# Patient Record
Sex: MALE | Race: WHITE | NOT HISPANIC OR LATINO | Employment: PART TIME | URBAN - NONMETROPOLITAN AREA
[De-identification: names, ages, dates, MRNs, and addresses within clinical notes are randomized per-mention and may not be internally consistent; named-entity substitution may affect disease eponyms.]

---

## 2017-06-07 ENCOUNTER — HOSPITAL ENCOUNTER (EMERGENCY)
Facility: HOSPITAL | Age: 22
Discharge: HOME | End: 2017-06-07
Admitting: EMERGENCY MEDICINE

## 2017-06-07 VITALS
HEIGHT: 70 IN | RESPIRATION RATE: 19 BRPM | DIASTOLIC BLOOD PRESSURE: 78 MMHG | HEART RATE: 81 BPM | OXYGEN SATURATION: 100 % | SYSTOLIC BLOOD PRESSURE: 137 MMHG | WEIGHT: 204.25 LBS | TEMPERATURE: 97.4 F | BODY MASS INDEX: 29.24 KG/M2

## 2017-06-07 DIAGNOSIS — K08.89 PAIN, DENTAL: ICD-10-CM

## 2017-06-07 DIAGNOSIS — K04.7 DENTAL ABSCESS: Primary | ICD-10-CM

## 2017-06-07 PROCEDURE — 99282 EMERGENCY DEPT VISIT SF MDM: CPT

## 2017-06-07 RX ORDER — IBUPROFEN 800 MG/1
800 TABLET ORAL EVERY 8 HOURS PRN
Qty: 15 TABLET | Refills: 0 | Status: SHIPPED | OUTPATIENT
Start: 2017-06-07 | End: 2019-01-16

## 2017-06-07 RX ORDER — PENICILLIN V POTASSIUM 500 MG/1
500 TABLET ORAL 4 TIMES DAILY
Qty: 40 TABLET | Refills: 0 | Status: SHIPPED | OUTPATIENT
Start: 2017-06-07 | End: 2017-06-17

## 2017-06-07 NOTE — ED PROVIDER NOTES
Subjective   Patient is a 22 y.o. male presenting with tooth pain.   History provided by:  Patient   used: No    Dental Pain   Location:  Upper  Upper teeth location:  2/RU 2nd molar  Quality:  Throbbing and pulsating  Severity:  Moderate  Onset quality:  Gradual  Duration:  7 days  Timing:  Constant  Progression:  Worsening  Chronicity:  New  Context: poor dentition    Context: cap still on, not crown fracture, not dental caries, not dental fracture, not enamel fracture, filling intact, not intrusion, not malocclusion, not recent dental surgery and not trauma    Relieved by:  Nothing  Worsened by:  Nothing  Ineffective treatments:  None tried  Associated symptoms: facial swelling    Associated symptoms: no congestion, no difficulty swallowing, no facial pain, no fever, no gum swelling, no headaches, no neck pain, no neck swelling, no oral bleeding, no oral lesions and no trismus        Review of Systems   Constitutional: Negative for fever.   HENT: Positive for dental problem and facial swelling. Negative for congestion and mouth sores.    Eyes: Negative.    Respiratory: Negative.    Cardiovascular: Negative.    Gastrointestinal: Negative.    Endocrine: Negative.    Genitourinary: Negative.    Musculoskeletal: Negative.  Negative for neck pain.   Skin: Negative.    Allergic/Immunologic: Negative.    Neurological: Negative for headaches.   Hematological: Negative.    Psychiatric/Behavioral: Negative.        Past Medical History:   Diagnosis Date   • Patient denies medical problems        No Known Allergies    Past Surgical History:   Procedure Laterality Date   • NO PAST SURGERIES         History reviewed. No pertinent family history.    Social History     Social History   • Marital status:      Spouse name: N/A   • Number of children: N/A   • Years of education: N/A     Social History Main Topics   • Smoking status: Current Every Day Smoker     Packs/day: 1.00   • Smokeless tobacco: None    • Alcohol use Yes      Comment: occ   • Drug use: No   • Sexual activity: Not Asked     Other Topics Concern   • None     Social History Narrative   • None           Objective   Physical Exam   Constitutional: He is oriented to person, place, and time. He appears well-developed and well-nourished. No distress.   HENT:   Head: Normocephalic and atraumatic.   Right Ear: External ear normal.   Left Ear: External ear normal.   Mouth/Throat: Oropharynx is clear and moist. Abnormal dentition. No dental abscesses.       Eyes: EOM are normal. Pupils are equal, round, and reactive to light.   Neck: Normal range of motion.   Cardiovascular: Normal rate, regular rhythm and normal heart sounds.  Exam reveals no friction rub.    No murmur heard.  Pulmonary/Chest: Effort normal and breath sounds normal. No respiratory distress. He has no wheezes. He has no rales.   Musculoskeletal: Normal range of motion. He exhibits no edema or deformity.   Lymphadenopathy:     He has no cervical adenopathy.   Neurological: He is alert and oriented to person, place, and time.   Skin: Skin is warm and dry. No rash noted. He is not diaphoretic. No erythema. No pallor.   Psychiatric: He has a normal mood and affect. His behavior is normal.   Nursing note and vitals reviewed.      Procedures         ED Course  ED Course                  MDM    Final diagnoses:   Dental abscess   Pain, dental            Yuriyjaylene Ordonez PA-C  06/07/17 6457

## 2017-08-29 ENCOUNTER — HOSPITAL ENCOUNTER (EMERGENCY)
Facility: HOSPITAL | Age: 22
Discharge: HOME | End: 2017-08-29
Admitting: EMERGENCY MEDICINE

## 2017-08-29 VITALS
RESPIRATION RATE: 20 BRPM | HEART RATE: 62 BPM | HEIGHT: 70 IN | TEMPERATURE: 98.6 F | BODY MASS INDEX: 28.35 KG/M2 | DIASTOLIC BLOOD PRESSURE: 87 MMHG | SYSTOLIC BLOOD PRESSURE: 130 MMHG | WEIGHT: 198 LBS | OXYGEN SATURATION: 100 %

## 2017-08-29 DIAGNOSIS — Z20.2 STD EXPOSURE: Primary | ICD-10-CM

## 2017-08-29 LAB
CLUE CELLS SPEC QL WET PREP: ABNORMAL
HYDATID CYST SPEC WET PREP: ABNORMAL
T VAGINALIS SPEC QL WET PREP: ABNORMAL
WBC SPEC QL WET PREP: ABNORMAL
YEAST GENITAL QL WET PREP: ABNORMAL

## 2017-08-29 PROCEDURE — 96372 THER/PROPH/DIAG INJ SC/IM: CPT

## 2017-08-29 PROCEDURE — 87270 CHLAMYDIA TRACHOMATIS AG IF: CPT | Performed by: PHYSICIAN ASSISTANT

## 2017-08-29 PROCEDURE — 87491 CHLMYD TRACH DNA AMP PROBE: CPT | Performed by: PHYSICIAN ASSISTANT

## 2017-08-29 PROCEDURE — 25010000002 CEFTRIAXONE PER 250 MG: Performed by: PHYSICIAN ASSISTANT

## 2017-08-29 PROCEDURE — 87591 N.GONORRHOEAE DNA AMP PROB: CPT | Performed by: PHYSICIAN ASSISTANT

## 2017-08-29 PROCEDURE — 87210 SMEAR WET MOUNT SALINE/INK: CPT | Performed by: PHYSICIAN ASSISTANT

## 2017-08-29 PROCEDURE — 99283 EMERGENCY DEPT VISIT LOW MDM: CPT

## 2017-08-29 RX ORDER — AZITHROMYCIN 500 MG/1
2000 TABLET, FILM COATED ORAL DAILY
Qty: 4 TABLET | Refills: 0 | Status: SHIPPED | OUTPATIENT
Start: 2017-08-29 | End: 2019-01-16

## 2017-08-29 RX ORDER — DOXYCYCLINE 100 MG/1
100 CAPSULE ORAL 2 TIMES DAILY
Qty: 14 CAPSULE | Refills: 0 | Status: SHIPPED | OUTPATIENT
Start: 2017-08-29 | End: 2019-01-16

## 2017-08-29 RX ORDER — LIDOCAINE HYDROCHLORIDE 10 MG/ML
0.9 INJECTION, SOLUTION EPIDURAL; INFILTRATION; INTRACAUDAL; PERINEURAL ONCE
Status: COMPLETED | OUTPATIENT
Start: 2017-08-29 | End: 2017-08-29

## 2017-08-29 RX ORDER — CEFTRIAXONE 500 MG/1
250 INJECTION, POWDER, FOR SOLUTION INTRAMUSCULAR; INTRAVENOUS ONCE
Status: COMPLETED | OUTPATIENT
Start: 2017-08-29 | End: 2017-08-29

## 2017-08-29 RX ADMIN — LIDOCAINE HYDROCHLORIDE 0.9 ML: 10 INJECTION, SOLUTION EPIDURAL; INFILTRATION; INTRACAUDAL; PERINEURAL at 14:48

## 2017-08-29 RX ADMIN — CEFTRIAXONE SODIUM 250 MG: 500 INJECTION, POWDER, FOR SOLUTION INTRAMUSCULAR; INTRAVENOUS at 14:48

## 2017-08-30 LAB
C TRACH RRNA SPEC DONR QL NAA+PROBE: POSITIVE
N GONORRHOEA DNA SPEC QL NAA+PROBE: NEGATIVE

## 2018-07-16 ENCOUNTER — APPOINTMENT (OUTPATIENT)
Dept: CT IMAGING | Age: 23
End: 2018-07-16

## 2018-07-16 ENCOUNTER — HOSPITAL ENCOUNTER (EMERGENCY)
Age: 23
Discharge: HOME | End: 2018-07-16
Attending: EMERGENCY MEDICINE

## 2018-07-16 VITALS
OXYGEN SATURATION: 98 % | RESPIRATION RATE: 18 BRPM | HEIGHT: 71 IN | SYSTOLIC BLOOD PRESSURE: 114 MMHG | WEIGHT: 190 LBS | TEMPERATURE: 97.6 F | HEART RATE: 53 BPM | DIASTOLIC BLOOD PRESSURE: 70 MMHG | BODY MASS INDEX: 26.6 KG/M2

## 2018-07-16 DIAGNOSIS — K52.9 GASTROENTERITIS: Primary | ICD-10-CM

## 2018-07-16 DIAGNOSIS — R10.84 GENERALIZED ABDOMINAL PAIN: ICD-10-CM

## 2018-07-16 LAB
ALBUMIN SERPL-MCNC: 4.9 G/DL (ref 3.5–5.2)
ALP BLD-CCNC: 68 U/L (ref 40–130)
ALT SERPL-CCNC: 23 U/L (ref 5–41)
ANION GAP SERPL CALCULATED.3IONS-SCNC: 20 MMOL/L (ref 7–19)
AST SERPL-CCNC: 24 U/L (ref 5–40)
BASOPHILS ABSOLUTE: 0.1 K/UL (ref 0–0.2)
BASOPHILS RELATIVE PERCENT: 0.3 % (ref 0–1)
BILIRUB SERPL-MCNC: 0.7 MG/DL (ref 0.2–1.2)
BUN BLDV-MCNC: 11 MG/DL (ref 6–20)
CALCIUM SERPL-MCNC: 10 MG/DL (ref 8.6–10)
CHLORIDE BLD-SCNC: 104 MMOL/L (ref 98–111)
CO2: 18 MMOL/L (ref 22–29)
CREAT SERPL-MCNC: 0.7 MG/DL (ref 0.5–1.2)
EOSINOPHILS ABSOLUTE: 0 K/UL (ref 0–0.6)
EOSINOPHILS RELATIVE PERCENT: 0.2 % (ref 0–5)
GFR NON-AFRICAN AMERICAN: >60
GLUCOSE BLD-MCNC: 156 MG/DL (ref 74–109)
HCT VFR BLD CALC: 45.1 % (ref 42–52)
HEMOGLOBIN: 15.1 G/DL (ref 14–18)
LIPASE: 20 U/L (ref 13–60)
LYMPHOCYTES ABSOLUTE: 1.8 K/UL (ref 1.1–4.5)
LYMPHOCYTES RELATIVE PERCENT: 11.7 % (ref 20–40)
MCH RBC QN AUTO: 29.8 PG (ref 27–31)
MCHC RBC AUTO-ENTMCNC: 33.5 G/DL (ref 33–37)
MCV RBC AUTO: 89 FL (ref 80–94)
MONOCYTES ABSOLUTE: 0.7 K/UL (ref 0–0.9)
MONOCYTES RELATIVE PERCENT: 4.8 % (ref 0–10)
NEUTROPHILS ABSOLUTE: 12.7 K/UL (ref 1.5–7.5)
NEUTROPHILS RELATIVE PERCENT: 82.3 % (ref 50–65)
PDW BLD-RTO: 13.4 % (ref 11.5–14.5)
PLATELET # BLD: 308 K/UL (ref 130–400)
PMV BLD AUTO: 9.7 FL (ref 9.4–12.4)
POTASSIUM SERPL-SCNC: 4.1 MMOL/L (ref 3.5–5)
RBC # BLD: 5.07 M/UL (ref 4.7–6.1)
SODIUM BLD-SCNC: 142 MMOL/L (ref 136–145)
TOTAL PROTEIN: 7.8 G/DL (ref 6.6–8.7)
WBC # BLD: 15.4 K/UL (ref 4.8–10.8)

## 2018-07-16 PROCEDURE — 80053 COMPREHEN METABOLIC PANEL: CPT

## 2018-07-16 PROCEDURE — 96374 THER/PROPH/DIAG INJ IV PUSH: CPT

## 2018-07-16 PROCEDURE — 83690 ASSAY OF LIPASE: CPT

## 2018-07-16 PROCEDURE — 2580000003 HC RX 258: Performed by: EMERGENCY MEDICINE

## 2018-07-16 PROCEDURE — 85025 COMPLETE CBC W/AUTO DIFF WBC: CPT

## 2018-07-16 PROCEDURE — 36415 COLL VENOUS BLD VENIPUNCTURE: CPT

## 2018-07-16 PROCEDURE — 6360000002 HC RX W HCPCS: Performed by: EMERGENCY MEDICINE

## 2018-07-16 PROCEDURE — 99284 EMERGENCY DEPT VISIT MOD MDM: CPT

## 2018-07-16 PROCEDURE — 96375 TX/PRO/DX INJ NEW DRUG ADDON: CPT

## 2018-07-16 PROCEDURE — 99284 EMERGENCY DEPT VISIT MOD MDM: CPT | Performed by: EMERGENCY MEDICINE

## 2018-07-16 PROCEDURE — 74177 CT ABD & PELVIS W/CONTRAST: CPT

## 2018-07-16 PROCEDURE — 6360000004 HC RX CONTRAST MEDICATION: Performed by: EMERGENCY MEDICINE

## 2018-07-16 RX ORDER — ONDANSETRON 2 MG/ML
4 INJECTION INTRAMUSCULAR; INTRAVENOUS ONCE
Status: COMPLETED | OUTPATIENT
Start: 2018-07-16 | End: 2018-07-16

## 2018-07-16 RX ORDER — DICYCLOMINE HCL 20 MG
20 TABLET ORAL EVERY 6 HOURS
Qty: 20 TABLET | Refills: 0 | Status: SHIPPED | OUTPATIENT
Start: 2018-07-16 | End: 2019-05-09 | Stop reason: ALTCHOICE

## 2018-07-16 RX ORDER — PROMETHAZINE HYDROCHLORIDE 25 MG/1
25 TABLET ORAL EVERY 6 HOURS PRN
Qty: 15 TABLET | Refills: 0 | Status: SHIPPED | OUTPATIENT
Start: 2018-07-16 | End: 2018-07-23

## 2018-07-16 RX ORDER — 0.9 % SODIUM CHLORIDE 0.9 %
1000 INTRAVENOUS SOLUTION INTRAVENOUS ONCE
Status: COMPLETED | OUTPATIENT
Start: 2018-07-16 | End: 2018-07-16

## 2018-07-16 RX ORDER — MORPHINE SULFATE 1 MG/ML
2 INJECTION, SOLUTION EPIDURAL; INTRATHECAL; INTRAVENOUS ONCE
Status: COMPLETED | OUTPATIENT
Start: 2018-07-16 | End: 2018-07-16

## 2018-07-16 RX ORDER — HYDROMORPHONE HCL IN 0.9% NACL 0.5 MG/ML
1 SYRINGE (ML) INTRAVENOUS ONCE
Status: COMPLETED | OUTPATIENT
Start: 2018-07-16 | End: 2018-07-16

## 2018-07-16 RX ADMIN — ONDANSETRON 4 MG: 2 INJECTION INTRAMUSCULAR; INTRAVENOUS at 03:31

## 2018-07-16 RX ADMIN — Medication 1 MG: at 05:42

## 2018-07-16 RX ADMIN — SODIUM CHLORIDE 1000 ML: 9 INJECTION, SOLUTION INTRAVENOUS at 03:31

## 2018-07-16 RX ADMIN — Medication 2 MG: at 04:53

## 2018-07-16 RX ADMIN — IOPAMIDOL 90 ML: 755 INJECTION, SOLUTION INTRAVENOUS at 05:08

## 2018-07-16 RX ADMIN — PROCHLORPERAZINE EDISYLATE 10 MG: 5 INJECTION INTRAMUSCULAR; INTRAVENOUS at 04:57

## 2018-07-16 ASSESSMENT — ENCOUNTER SYMPTOMS
VOMITING: 1
PHOTOPHOBIA: 0
NAUSEA: 1
SHORTNESS OF BREATH: 0
EYE PAIN: 0
ABDOMINAL PAIN: 1
COLOR CHANGE: 0
ABDOMINAL DISTENTION: 0
DIARRHEA: 1
WHEEZING: 0
SINUS PRESSURE: 0
BACK PAIN: 0
CONSTIPATION: 0
CHEST TIGHTNESS: 0
TROUBLE SWALLOWING: 0

## 2018-07-16 ASSESSMENT — PAIN SCALES - GENERAL
PAINLEVEL_OUTOF10: 10
PAINLEVEL_OUTOF10: 10
PAINLEVEL_OUTOF10: 5
PAINLEVEL_OUTOF10: 10

## 2018-07-16 ASSESSMENT — PAIN DESCRIPTION - LOCATION: LOCATION: ABDOMEN

## 2018-07-16 NOTE — ED NOTES
Pt was instructed several times to give urine specimen and he did not provide one.      Brittney Edwards RN  07/16/18 0997

## 2018-07-16 NOTE — ED PROVIDER NOTES
of Diagnoses or Management Options  Gastroenteritis: new and requires workup  Generalized abdominal pain: new and requires workup  Diagnosis management comments: Patient's lab work shows a moderately elevated WBC count. His pain appeared out of proportion to his exam. The CT was performed and it was essentially negative for acute abdomen. I suspect the patient has gastroenteritis. I will give him nausea medication and antispasmodic and Bentyl. After careful review of history, physical, and supporting data, there is very low suspicion for an acute abdomen. The differential includes but is not limited to appendicitis, torsion, perforated viscus, ischemic bowel, obstruction, and infarct. The patient's symptoms have improved from presentation and appears clinically well. Patient reexamined prior to discharge and appears improved. Patient has been encouraged to follow up with his/her PCP and to return to the ED with any lack of improvement or worsening symptoms. Patient Progress  Patient progress: stable      Reassessment      CONSULTS:  None    PROCEDURES:  Unless otherwise noted below, none     Procedures    FINAL IMPRESSION      1. Gastroenteritis    2. Generalized abdominal pain          DISPOSITION/PLAN   DISPOSITION Decision To Discharge 07/16/2018 06:35:47 AM    PATIENT REFERRED TO:  No follow-up provider specified.     DISCHARGE MEDICATIONS:  New Prescriptions    DICYCLOMINE (BENTYL) 20 MG TABLET    Take 1 tablet by mouth every 6 hours    PROMETHAZINE (PHENERGAN) 25 MG TABLET    Take 1 tablet by mouth every 6 hours as needed for Nausea          (Please note that portions of this note were completed with a voice recognition program.  Efforts were made to edit the dictations but occasionally words are mis-transcribed.)    Charanjit Reich MD (electronically signed)  Attending Emergency Physician          Kvng Otto MD  07/16/18 0241

## 2018-07-16 NOTE — ED NOTES
Assumed care of pt, report from Simran dhaliwal RN at bedside. Pt is awaiting  for [place].       Geraldine John RN  07/16/18 9971

## 2018-10-30 ENCOUNTER — APPOINTMENT (OUTPATIENT)
Dept: CT IMAGING | Facility: HOSPITAL | Age: 23
End: 2018-10-30

## 2018-10-30 ENCOUNTER — HOSPITAL ENCOUNTER (EMERGENCY)
Facility: HOSPITAL | Age: 23
Discharge: HOME | End: 2018-10-30
Admitting: EMERGENCY MEDICINE

## 2018-10-30 VITALS
DIASTOLIC BLOOD PRESSURE: 82 MMHG | OXYGEN SATURATION: 100 % | WEIGHT: 180 LBS | HEART RATE: 51 BPM | RESPIRATION RATE: 20 BRPM | BODY MASS INDEX: 25.77 KG/M2 | TEMPERATURE: 98.7 F | SYSTOLIC BLOOD PRESSURE: 136 MMHG | HEIGHT: 70 IN

## 2018-10-30 DIAGNOSIS — R11.2 NAUSEA AND VOMITING, INTRACTABILITY OF VOMITING NOT SPECIFIED, UNSPECIFIED VOMITING TYPE: ICD-10-CM

## 2018-10-30 DIAGNOSIS — R10.13 EPIGASTRIC PAIN: Primary | ICD-10-CM

## 2018-10-30 LAB
ALBUMIN SERPL-MCNC: 4.6 G/DL (ref 3.5–5)
ALBUMIN/GLOB SERPL: 1.6 G/DL (ref 1.1–2.5)
ALP SERPL-CCNC: 78 U/L (ref 24–120)
ALT SERPL W P-5'-P-CCNC: 19 U/L (ref 0–54)
AMPHET+METHAMPHET UR QL: NEGATIVE
AMYLASE SERPL-CCNC: 64 U/L (ref 30–110)
ANION GAP SERPL CALCULATED.3IONS-SCNC: 16 MMOL/L (ref 4–13)
AST SERPL-CCNC: 26 U/L (ref 7–45)
BARBITURATES UR QL SCN: NEGATIVE
BASOPHILS # BLD AUTO: 0.03 10*3/MM3 (ref 0–0.2)
BASOPHILS NFR BLD AUTO: 0.2 % (ref 0–2)
BENZODIAZ UR QL SCN: NEGATIVE
BILIRUB SERPL-MCNC: 0.9 MG/DL (ref 0.1–1)
BUN BLD-MCNC: 10 MG/DL (ref 5–21)
BUN/CREAT SERPL: 14.7 (ref 7–25)
CALCIUM SPEC-SCNC: 9.7 MG/DL (ref 8.4–10.4)
CANNABINOIDS SERPL QL: NEGATIVE
CHLORIDE SERPL-SCNC: 105 MMOL/L (ref 98–110)
CO2 SERPL-SCNC: 18 MMOL/L (ref 24–31)
COCAINE UR QL: NEGATIVE
CREAT BLD-MCNC: 0.68 MG/DL (ref 0.5–1.4)
D-LACTATE SERPL-SCNC: 1.8 MMOL/L (ref 0.5–2)
DEPRECATED RDW RBC AUTO: 42.3 FL (ref 40–54)
EOSINOPHIL # BLD AUTO: 0.01 10*3/MM3 (ref 0–0.7)
EOSINOPHIL NFR BLD AUTO: 0.1 % (ref 0–4)
ERYTHROCYTE [DISTWIDTH] IN BLOOD BY AUTOMATED COUNT: 13.2 % (ref 12–15)
ETHANOL UR QL: <0.01 %
GFR SERPL CREATININE-BSD FRML MDRD: 143 ML/MIN/1.73
GLOBULIN UR ELPH-MCNC: 2.9 GM/DL
GLUCOSE BLD-MCNC: 113 MG/DL (ref 70–100)
HCT VFR BLD AUTO: 39.5 % (ref 40–52)
HGB BLD-MCNC: 14 G/DL (ref 14–18)
HOLD SPECIMEN: NORMAL
HOLD SPECIMEN: NORMAL
IMM GRANULOCYTES # BLD: 0.05 10*3/MM3 (ref 0–0.03)
IMM GRANULOCYTES NFR BLD: 0.4 % (ref 0–5)
LIPASE SERPL-CCNC: 41 U/L (ref 23–203)
LYMPHOCYTES # BLD AUTO: 1.16 10*3/MM3 (ref 0.72–4.86)
LYMPHOCYTES NFR BLD AUTO: 8.9 % (ref 15–45)
MCH RBC QN AUTO: 31 PG (ref 28–32)
MCHC RBC AUTO-ENTMCNC: 35.4 G/DL (ref 33–36)
MCV RBC AUTO: 87.4 FL (ref 82–95)
METHADONE UR QL SCN: NEGATIVE
MONOCYTES # BLD AUTO: 0.44 10*3/MM3 (ref 0.19–1.3)
MONOCYTES NFR BLD AUTO: 3.4 % (ref 4–12)
NEUTROPHILS # BLD AUTO: 11.34 10*3/MM3 (ref 1.87–8.4)
NEUTROPHILS NFR BLD AUTO: 87 % (ref 39–78)
NRBC BLD MANUAL-RTO: 0 /100 WBC (ref 0–0)
OPIATES UR QL: POSITIVE
PCP UR QL SCN: NEGATIVE
PLATELET # BLD AUTO: 323 10*3/MM3 (ref 130–400)
PMV BLD AUTO: 10 FL (ref 6–12)
POTASSIUM BLD-SCNC: 3.4 MMOL/L (ref 3.5–5.3)
PROT SERPL-MCNC: 7.5 G/DL (ref 6.3–8.7)
RBC # BLD AUTO: 4.52 10*6/MM3 (ref 4.8–5.9)
SODIUM BLD-SCNC: 139 MMOL/L (ref 135–145)
WBC NRBC COR # BLD: 13.03 10*3/MM3 (ref 4.8–10.8)
WHOLE BLOOD HOLD SPECIMEN: NORMAL
WHOLE BLOOD HOLD SPECIMEN: NORMAL

## 2018-10-30 PROCEDURE — 96376 TX/PRO/DX INJ SAME DRUG ADON: CPT

## 2018-10-30 PROCEDURE — 80307 DRUG TEST PRSMV CHEM ANLYZR: CPT | Performed by: NURSE PRACTITIONER

## 2018-10-30 PROCEDURE — 83690 ASSAY OF LIPASE: CPT | Performed by: NURSE PRACTITIONER

## 2018-10-30 PROCEDURE — 83605 ASSAY OF LACTIC ACID: CPT | Performed by: NURSE PRACTITIONER

## 2018-10-30 PROCEDURE — 82150 ASSAY OF AMYLASE: CPT | Performed by: NURSE PRACTITIONER

## 2018-10-30 PROCEDURE — 25010000002 ONDANSETRON PER 1 MG: Performed by: NURSE PRACTITIONER

## 2018-10-30 PROCEDURE — 99283 EMERGENCY DEPT VISIT LOW MDM: CPT

## 2018-10-30 PROCEDURE — 74177 CT ABD & PELVIS W/CONTRAST: CPT

## 2018-10-30 PROCEDURE — 96375 TX/PRO/DX INJ NEW DRUG ADDON: CPT

## 2018-10-30 PROCEDURE — 25010000002 IOPAMIDOL 61 % SOLUTION: Performed by: NURSE PRACTITIONER

## 2018-10-30 PROCEDURE — 80053 COMPREHEN METABOLIC PANEL: CPT | Performed by: NURSE PRACTITIONER

## 2018-10-30 PROCEDURE — 96361 HYDRATE IV INFUSION ADD-ON: CPT

## 2018-10-30 PROCEDURE — 96374 THER/PROPH/DIAG INJ IV PUSH: CPT

## 2018-10-30 PROCEDURE — 85025 COMPLETE CBC W/AUTO DIFF WBC: CPT | Performed by: NURSE PRACTITIONER

## 2018-10-30 RX ORDER — ONDANSETRON 4 MG/1
4 TABLET, ORALLY DISINTEGRATING ORAL EVERY 6 HOURS PRN
Qty: 10 TABLET | Refills: 0 | Status: SHIPPED | OUTPATIENT
Start: 2018-10-30 | End: 2019-01-16

## 2018-10-30 RX ORDER — PANTOPRAZOLE SODIUM 40 MG/1
40 TABLET, DELAYED RELEASE ORAL DAILY
Qty: 30 TABLET | Refills: 0 | Status: SHIPPED | OUTPATIENT
Start: 2018-10-30 | End: 2019-01-16

## 2018-10-30 RX ORDER — SUCRALFATE ORAL 1 G/10ML
1 SUSPENSION ORAL 4 TIMES DAILY
Qty: 400 ML | Refills: 0 | Status: SHIPPED | OUTPATIENT
Start: 2018-10-30 | End: 2018-11-09

## 2018-10-30 RX ORDER — ONDANSETRON 2 MG/ML
4 INJECTION INTRAMUSCULAR; INTRAVENOUS ONCE
Status: COMPLETED | OUTPATIENT
Start: 2018-10-30 | End: 2018-10-30

## 2018-10-30 RX ORDER — FAMOTIDINE 10 MG/ML
20 INJECTION, SOLUTION INTRAVENOUS ONCE
Status: COMPLETED | OUTPATIENT
Start: 2018-10-30 | End: 2018-10-30

## 2018-10-30 RX ORDER — MEPERIDINE HYDROCHLORIDE 25 MG/ML
25 INJECTION INTRAMUSCULAR; INTRAVENOUS; SUBCUTANEOUS ONCE
Status: COMPLETED | OUTPATIENT
Start: 2018-10-30 | End: 2018-10-30

## 2018-10-30 RX ADMIN — IOPAMIDOL 100 ML: 612 INJECTION, SOLUTION INTRAVENOUS at 17:39

## 2018-10-30 RX ADMIN — ONDANSETRON HYDROCHLORIDE 4 MG: 2 INJECTION INTRAMUSCULAR; INTRAVENOUS at 18:16

## 2018-10-30 RX ADMIN — MEPERIDINE HYDROCHLORIDE 25 MG: 25 INJECTION INTRAMUSCULAR; INTRAVENOUS; SUBCUTANEOUS at 18:16

## 2018-10-30 RX ADMIN — FAMOTIDINE 20 MG: 10 INJECTION, SOLUTION INTRAVENOUS at 16:57

## 2018-10-30 RX ADMIN — SODIUM CHLORIDE 1000 ML: 9 INJECTION, SOLUTION INTRAVENOUS at 16:45

## 2018-10-30 RX ADMIN — ONDANSETRON HYDROCHLORIDE 4 MG: 2 INJECTION INTRAMUSCULAR; INTRAVENOUS at 16:55

## 2019-01-16 ENCOUNTER — HOSPITAL ENCOUNTER (EMERGENCY)
Facility: HOSPITAL | Age: 24
Discharge: HOME | End: 2019-01-17
Admitting: EMERGENCY MEDICINE

## 2019-01-16 DIAGNOSIS — K08.89 PAIN, DENTAL: Primary | ICD-10-CM

## 2019-01-16 PROCEDURE — 99283 EMERGENCY DEPT VISIT LOW MDM: CPT

## 2019-01-16 RX ORDER — ONDANSETRON 2 MG/ML
4 INJECTION INTRAMUSCULAR; INTRAVENOUS ONCE
Status: COMPLETED | OUTPATIENT
Start: 2019-01-16 | End: 2019-01-17

## 2019-01-17 VITALS
DIASTOLIC BLOOD PRESSURE: 71 MMHG | TEMPERATURE: 98.5 F | SYSTOLIC BLOOD PRESSURE: 119 MMHG | WEIGHT: 171.4 LBS | HEART RATE: 92 BPM | HEIGHT: 71 IN | RESPIRATION RATE: 18 BRPM | OXYGEN SATURATION: 97 % | BODY MASS INDEX: 24 KG/M2

## 2019-01-17 PROCEDURE — 96372 THER/PROPH/DIAG INJ SC/IM: CPT

## 2019-01-17 PROCEDURE — 25010000002 ONDANSETRON PER 1 MG: Performed by: NURSE PRACTITIONER

## 2019-01-17 RX ORDER — PENICILLIN V POTASSIUM 500 MG/1
500 TABLET ORAL 4 TIMES DAILY
Qty: 40 TABLET | Refills: 0 | Status: SHIPPED | OUTPATIENT
Start: 2019-01-17 | End: 2019-01-27

## 2019-01-17 RX ORDER — HYDROCODONE BITARTRATE AND ACETAMINOPHEN 5; 325 MG/1; MG/1
1 TABLET ORAL EVERY 4 HOURS PRN
Qty: 15 TABLET | Refills: 0 | OUTPATIENT
Start: 2019-01-17 | End: 2019-11-03 | Stop reason: HOSPADM

## 2019-01-17 RX ADMIN — ONDANSETRON HYDROCHLORIDE 4 MG: 2 INJECTION INTRAMUSCULAR; INTRAVENOUS at 00:26

## 2019-01-17 RX ADMIN — HYDROMORPHONE HYDROCHLORIDE 1 MG: 1 INJECTION, SOLUTION INTRAMUSCULAR; INTRAVENOUS; SUBCUTANEOUS at 00:27

## 2019-01-17 NOTE — ED PROVIDER NOTES
Subjective     Dental Pain   Location:  Upper  Upper teeth location:  15/LEONARD 2nd molar  Quality:  Throbbing  Chronicity:  Recurrent  Context: dental fracture    Worsened by:  Hot food/drink  Ineffective treatments:  None tried  Associated symptoms: facial pain    Associated symptoms: no difficulty swallowing, no drooling, no facial swelling, no fever, no gum swelling, no neck pain, no neck swelling, no oral bleeding, no oral lesions and no trismus    Risk factors: lack of dental care and smoking        Review of Systems   Constitutional: Negative for fever.   HENT: Positive for dental problem. Negative for drooling, facial swelling and mouth sores.    Respiratory: Negative for shortness of breath and wheezing.    Cardiovascular: Negative for chest pain.   Musculoskeletal: Negative for neck pain and neck stiffness.   Skin: Negative for color change, pallor, rash and wound.   All other systems reviewed and are negative.      Past Medical History:   Diagnosis Date   • Patient denies medical problems        No Known Allergies    Past Surgical History:   Procedure Laterality Date   • NO PAST SURGERIES     • TONSILLECTOMY         History reviewed. No pertinent family history.    Social History     Socioeconomic History   • Marital status:      Spouse name: Not on file   • Number of children: Not on file   • Years of education: Not on file   • Highest education level: Not on file   Tobacco Use   • Smoking status: Current Every Day Smoker     Packs/day: 1.00   Substance and Sexual Activity   • Alcohol use: Yes     Comment: occ   • Drug use: No   • Sexual activity: Defer           Objective   Physical Exam   Constitutional: He is oriented to person, place, and time. He appears well-developed and well-nourished. No distress.   HENT:   Head: Normocephalic and atraumatic.   Right Ear: External ear normal.   Left Ear: External ear normal.   Nose: Nose normal.   Mouth/Throat: Oropharynx is clear and moist.   Pain to the  left upper 2nd molar with fractured tooth noted; no obvious abscess noted on gum line, no facial or neck swelling, no trismus noted   Eyes: Conjunctivae and EOM are normal. Pupils are equal, round, and reactive to light. No scleral icterus.   Neck: Normal range of motion. Neck supple. No JVD present. No thyromegaly present.   Cardiovascular: Normal rate, regular rhythm, normal heart sounds and intact distal pulses.   No murmur heard.  Pulmonary/Chest: Effort normal and breath sounds normal. No respiratory distress. He has no wheezes. He has no rales. He exhibits no tenderness.   Abdominal: Soft. Bowel sounds are normal. He exhibits no distension and no mass. There is no tenderness. There is no rebound and no guarding.   Musculoskeletal: Normal range of motion. He exhibits no edema.   Lymphadenopathy:     He has no cervical adenopathy.   Neurological: He is alert and oriented to person, place, and time. He has normal reflexes. No cranial nerve deficit. Coordination normal.   Skin: Skin is warm and dry. Capillary refill takes less than 2 seconds. No rash noted. He is not diaphoretic. No erythema. No pallor.   Psychiatric: He has a normal mood and affect. His behavior is normal. Judgment and thought content normal.   Nursing note and vitals reviewed.      Procedures           ED Course                  MDM  Number of Diagnoses or Management Options  Pain, dental: new and does not require workup     Amount and/or Complexity of Data Reviewed  Discuss the patient with other providers: yes    Risk of Complications, Morbidity, and/or Mortality  Presenting problems: low  Diagnostic procedures: low  Management options: low    Patient Progress  Patient progress: improved        Final diagnoses:   Pain, dental            Vida Jacobs, APRN  01/17/19 1187

## 2019-03-02 ENCOUNTER — HOSPITAL ENCOUNTER (EMERGENCY)
Age: 24
Discharge: HOME | End: 2019-03-02

## 2019-03-02 ENCOUNTER — APPOINTMENT (OUTPATIENT)
Dept: GENERAL RADIOLOGY | Age: 24
End: 2019-03-02

## 2019-03-02 VITALS
TEMPERATURE: 98.2 F | HEART RATE: 104 BPM | WEIGHT: 165 LBS | RESPIRATION RATE: 19 BRPM | BODY MASS INDEX: 23.1 KG/M2 | HEIGHT: 71 IN | OXYGEN SATURATION: 93 %
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
## 2019-03-02 DIAGNOSIS — S61.451A DOG BITE OF RIGHT HAND, INITIAL ENCOUNTER: Primary | ICD-10-CM

## 2019-03-02 DIAGNOSIS — S81.851A DOG BITE OF MULTIPLE SITES OF LOWER EXTREMITY, RIGHT, INITIAL ENCOUNTER: ICD-10-CM

## 2019-03-02 PROCEDURE — 90715 TDAP VACCINE 7 YRS/> IM: CPT | Performed by: NURSE PRACTITIONER

## 2019-03-02 PROCEDURE — 12001 RPR S/N/AX/GEN/TRNK 2.5CM/<: CPT

## 2019-03-02 PROCEDURE — 2500000003 HC RX 250 WO HCPCS: Performed by: NURSE PRACTITIONER

## 2019-03-02 PROCEDURE — 99284 EMERGENCY DEPT VISIT MOD MDM: CPT | Performed by: NURSE PRACTITIONER

## 2019-03-02 PROCEDURE — 73130 X-RAY EXAM OF HAND: CPT

## 2019-03-02 PROCEDURE — 99283 EMERGENCY DEPT VISIT LOW MDM: CPT

## 2019-03-02 PROCEDURE — 90471 IMMUNIZATION ADMIN: CPT | Performed by: NURSE PRACTITIONER

## 2019-03-02 PROCEDURE — 6360000002 HC RX W HCPCS: Performed by: NURSE PRACTITIONER

## 2019-03-02 PROCEDURE — 12041 INTMD RPR N-HF/GENIT 2.5CM/<: CPT | Performed by: NURSE PRACTITIONER

## 2019-03-02 RX ORDER — HYDROCODONE BITARTRATE AND ACETAMINOPHEN 5; 325 MG/1; MG/1
1 TABLET ORAL EVERY 6 HOURS PRN
Qty: 12 TABLET | Refills: 0 | Status: SHIPPED | OUTPATIENT
Start: 2019-03-02 | End: 2019-03-05

## 2019-03-02 RX ORDER — AMOXICILLIN AND CLAVULANATE POTASSIUM 875; 125 MG/1; MG/1
1 TABLET, FILM COATED ORAL 2 TIMES DAILY
Qty: 20 TABLET | Refills: 0 | Status: SHIPPED | OUTPATIENT
Start: 2019-03-02 | End: 2019-03-12

## 2019-03-02 RX ORDER — LIDOCAINE HYDROCHLORIDE 10 MG/ML
5 INJECTION, SOLUTION EPIDURAL; INFILTRATION; INTRACAUDAL; PERINEURAL ONCE
Status: COMPLETED | OUTPATIENT
Start: 2019-03-02 | End: 2019-03-02

## 2019-03-02 RX ADMIN — LIDOCAINE HYDROCHLORIDE 5 ML: 10 INJECTION, SOLUTION EPIDURAL; INFILTRATION; INTRACAUDAL; PERINEURAL at 21:30

## 2019-03-02 RX ADMIN — TETANUS TOXOID, REDUCED DIPHTHERIA TOXOID AND ACELLULAR PERTUSSIS VACCINE, ADSORBED 0.5 ML: 5; 2.5; 8; 8; 2.5 SUSPENSION INTRAMUSCULAR at 20:27

## 2019-05-09 ENCOUNTER — HOSPITAL ENCOUNTER (EMERGENCY)
Age: 24
Discharge: HOME | End: 2019-05-09

## 2019-05-09 ENCOUNTER — APPOINTMENT (OUTPATIENT)
Dept: CT IMAGING | Age: 24
End: 2019-05-09

## 2019-05-09 VITALS
TEMPERATURE: 97.9 F | HEART RATE: 88 BPM | RESPIRATION RATE: 15 BRPM | WEIGHT: 165 LBS | DIASTOLIC BLOOD PRESSURE: 78 MMHG | SYSTOLIC BLOOD PRESSURE: 132 MMHG | OXYGEN SATURATION: 95 % | HEIGHT: 71 IN | BODY MASS INDEX: 23.1 KG/M2

## 2019-05-09 DIAGNOSIS — R11.2 NON-INTRACTABLE VOMITING WITH NAUSEA, UNSPECIFIED VOMITING TYPE: ICD-10-CM

## 2019-05-09 DIAGNOSIS — R10.84 GENERALIZED ABDOMINAL PAIN: Primary | ICD-10-CM

## 2019-05-09 DIAGNOSIS — R82.81 PYURIA: ICD-10-CM

## 2019-05-09 LAB
ALBUMIN SERPL-MCNC: 5.2 G/DL (ref 3.5–5.2)
ALP BLD-CCNC: 73 U/L (ref 40–130)
ALT SERPL-CCNC: 12 U/L (ref 5–41)
ANION GAP SERPL CALCULATED.3IONS-SCNC: 17 MMOL/L (ref 7–19)
AST SERPL-CCNC: 15 U/L (ref 5–40)
BACTERIA: NEGATIVE /HPF
BASOPHILS ABSOLUTE: 0 K/UL (ref 0–0.2)
BASOPHILS RELATIVE PERCENT: 0.2 % (ref 0–1)
BILIRUB SERPL-MCNC: 0.9 MG/DL (ref 0.2–1.2)
BILIRUBIN URINE: NEGATIVE
BLOOD, URINE: NEGATIVE
BUN BLDV-MCNC: 19 MG/DL (ref 6–20)
CALCIUM SERPL-MCNC: 10.2 MG/DL (ref 8.6–10)
CHLORIDE BLD-SCNC: 98 MMOL/L (ref 98–111)
CLARITY: ABNORMAL
CO2: 21 MMOL/L (ref 22–29)
COLOR: YELLOW
CREAT SERPL-MCNC: 0.7 MG/DL (ref 0.5–1.2)
EOSINOPHILS ABSOLUTE: 0 K/UL (ref 0–0.6)
EOSINOPHILS RELATIVE PERCENT: 0.1 % (ref 0–5)
EPITHELIAL CELLS, UA: 2 /HPF (ref 0–5)
GFR NON-AFRICAN AMERICAN: >60
GLUCOSE BLD-MCNC: 101 MG/DL (ref 74–109)
GLUCOSE URINE: NEGATIVE MG/DL
HCT VFR BLD CALC: 44.8 % (ref 42–52)
HEMOGLOBIN: 15.4 G/DL (ref 14–18)
HYALINE CASTS: 7 /HPF (ref 0–8)
KETONES, URINE: 80 MG/DL
LACTIC ACID: 1.8 MMOL/L (ref 0.5–1.9)
LEUKOCYTE ESTERASE, URINE: ABNORMAL
LIPASE: 27 U/L (ref 13–60)
LYMPHOCYTES ABSOLUTE: 1.4 K/UL (ref 1.1–4.5)
LYMPHOCYTES RELATIVE PERCENT: 10.8 % (ref 20–40)
MCH RBC QN AUTO: 30.6 PG (ref 27–31)
MCHC RBC AUTO-ENTMCNC: 34.4 G/DL (ref 33–37)
MCV RBC AUTO: 89.1 FL (ref 80–94)
MONOCYTES ABSOLUTE: 0.5 K/UL (ref 0–0.9)
MONOCYTES RELATIVE PERCENT: 3.6 % (ref 0–10)
NEUTROPHILS ABSOLUTE: 10.6 K/UL (ref 1.5–7.5)
NEUTROPHILS RELATIVE PERCENT: 84.9 % (ref 50–65)
NITRITE, URINE: NEGATIVE
PDW BLD-RTO: 13.6 % (ref 11.5–14.5)
PH UA: 7.5 (ref 5–8)
PLATELET # BLD: 294 K/UL (ref 130–400)
PMV BLD AUTO: 9.7 FL (ref 9.4–12.4)
POTASSIUM SERPL-SCNC: 4.1 MMOL/L (ref 3.5–5)
PROTEIN UA: NEGATIVE MG/DL
RBC # BLD: 5.03 M/UL (ref 4.7–6.1)
RBC UA: 3 /HPF (ref 0–4)
SODIUM BLD-SCNC: 136 MMOL/L (ref 136–145)
SPECIFIC GRAVITY UA: 1.03 (ref 1–1.03)
TOTAL PROTEIN: 8.2 G/DL (ref 6.6–8.7)
URINE REFLEX TO CULTURE: YES
UROBILINOGEN, URINE: 0.2 E.U./DL
WBC # BLD: 12.5 K/UL (ref 4.8–10.8)
WBC UA: 10 /HPF (ref 0–5)

## 2019-05-09 PROCEDURE — 6360000004 HC RX CONTRAST MEDICATION: Performed by: NURSE PRACTITIONER

## 2019-05-09 PROCEDURE — C9113 INJ PANTOPRAZOLE SODIUM, VIA: HCPCS | Performed by: NURSE PRACTITIONER

## 2019-05-09 PROCEDURE — 83605 ASSAY OF LACTIC ACID: CPT

## 2019-05-09 PROCEDURE — 80053 COMPREHEN METABOLIC PANEL: CPT

## 2019-05-09 PROCEDURE — 2580000003 HC RX 258: Performed by: NURSE PRACTITIONER

## 2019-05-09 PROCEDURE — 6370000000 HC RX 637 (ALT 250 FOR IP): Performed by: NURSE PRACTITIONER

## 2019-05-09 PROCEDURE — 81001 URINALYSIS AUTO W/SCOPE: CPT

## 2019-05-09 PROCEDURE — 93005 ELECTROCARDIOGRAM TRACING: CPT

## 2019-05-09 PROCEDURE — 96375 TX/PRO/DX INJ NEW DRUG ADDON: CPT

## 2019-05-09 PROCEDURE — 99284 EMERGENCY DEPT VISIT MOD MDM: CPT | Performed by: NURSE PRACTITIONER

## 2019-05-09 PROCEDURE — 99284 EMERGENCY DEPT VISIT MOD MDM: CPT

## 2019-05-09 PROCEDURE — 83690 ASSAY OF LIPASE: CPT

## 2019-05-09 PROCEDURE — 96374 THER/PROPH/DIAG INJ IV PUSH: CPT

## 2019-05-09 PROCEDURE — 87591 N.GONORRHOEAE DNA AMP PROB: CPT

## 2019-05-09 PROCEDURE — 36415 COLL VENOUS BLD VENIPUNCTURE: CPT

## 2019-05-09 PROCEDURE — 87086 URINE CULTURE/COLONY COUNT: CPT

## 2019-05-09 PROCEDURE — 87491 CHLMYD TRACH DNA AMP PROBE: CPT

## 2019-05-09 PROCEDURE — 74177 CT ABD & PELVIS W/CONTRAST: CPT

## 2019-05-09 PROCEDURE — 85025 COMPLETE CBC W/AUTO DIFF WBC: CPT

## 2019-05-09 PROCEDURE — 6360000002 HC RX W HCPCS: Performed by: NURSE PRACTITIONER

## 2019-05-09 RX ORDER — ONDANSETRON 4 MG/1
4 TABLET, ORALLY DISINTEGRATING ORAL EVERY 8 HOURS PRN
Qty: 15 TABLET | Refills: 0 | Status: SHIPPED | OUTPATIENT
Start: 2019-05-09 | End: 2019-05-24

## 2019-05-09 RX ORDER — PANTOPRAZOLE SODIUM 40 MG/10ML
40 INJECTION, POWDER, LYOPHILIZED, FOR SOLUTION INTRAVENOUS ONCE
Status: COMPLETED | OUTPATIENT
Start: 2019-05-09 | End: 2019-05-09

## 2019-05-09 RX ORDER — 0.9 % SODIUM CHLORIDE 0.9 %
10 VIAL (ML) INJECTION DAILY
Status: DISCONTINUED | OUTPATIENT
Start: 2019-05-09 | End: 2019-05-09 | Stop reason: HOSPADM

## 2019-05-09 RX ORDER — ONDANSETRON 2 MG/ML
4 INJECTION INTRAMUSCULAR; INTRAVENOUS ONCE
Status: COMPLETED | OUTPATIENT
Start: 2019-05-09 | End: 2019-05-09

## 2019-05-09 RX ORDER — 0.9 % SODIUM CHLORIDE 0.9 %
1000 INTRAVENOUS SOLUTION INTRAVENOUS ONCE
Status: COMPLETED | OUTPATIENT
Start: 2019-05-09 | End: 2019-05-09

## 2019-05-09 RX ORDER — MORPHINE SULFATE 4 MG/ML
4 INJECTION, SOLUTION INTRAMUSCULAR; INTRAVENOUS ONCE
Status: COMPLETED | OUTPATIENT
Start: 2019-05-09 | End: 2019-05-09

## 2019-05-09 RX ORDER — DICYCLOMINE HCL 20 MG
20 TABLET ORAL 4 TIMES DAILY
Qty: 30 TABLET | Refills: 0 | Status: ON HOLD | OUTPATIENT
Start: 2019-05-09 | End: 2020-10-08

## 2019-05-09 RX ORDER — PROMETHAZINE HYDROCHLORIDE 25 MG/ML
6.25 INJECTION, SOLUTION INTRAMUSCULAR; INTRAVENOUS ONCE
Status: COMPLETED | OUTPATIENT
Start: 2019-05-09 | End: 2019-05-09

## 2019-05-09 RX ORDER — OMEPRAZOLE 20 MG/1
20 CAPSULE, DELAYED RELEASE ORAL 2 TIMES DAILY
Qty: 30 CAPSULE | Refills: 0 | Status: SHIPPED | OUTPATIENT
Start: 2019-05-09 | End: 2020-09-15

## 2019-05-09 RX ORDER — SUCRALFATE 1 G/1
1 TABLET ORAL 4 TIMES DAILY
Qty: 30 TABLET | Refills: 0 | Status: SHIPPED | OUTPATIENT
Start: 2019-05-09 | End: 2020-09-23 | Stop reason: SDUPTHER

## 2019-05-09 RX ADMIN — PROMETHAZINE HYDROCHLORIDE 6.25 MG: 25 INJECTION INTRAMUSCULAR; INTRAVENOUS at 14:56

## 2019-05-09 RX ADMIN — ONDANSETRON 4 MG: 2 INJECTION INTRAMUSCULAR; INTRAVENOUS at 13:00

## 2019-05-09 RX ADMIN — LIDOCAINE HYDROCHLORIDE: 20 SOLUTION ORAL; TOPICAL at 17:12

## 2019-05-09 RX ADMIN — PANTOPRAZOLE SODIUM 40 MG: 40 INJECTION, POWDER, FOR SOLUTION INTRAVENOUS at 17:12

## 2019-05-09 RX ADMIN — MORPHINE SULFATE 4 MG: 4 INJECTION INTRAVENOUS at 14:56

## 2019-05-09 RX ADMIN — SODIUM CHLORIDE 1000 ML: 9 INJECTION, SOLUTION INTRAVENOUS at 15:28

## 2019-05-09 RX ADMIN — SODIUM CHLORIDE 1000 ML: 9 INJECTION, SOLUTION INTRAVENOUS at 13:00

## 2019-05-09 RX ADMIN — IOPAMIDOL 90 ML: 755 INJECTION, SOLUTION INTRAVENOUS at 15:18

## 2019-05-09 ASSESSMENT — PAIN SCALES - GENERAL
PAINLEVEL_OUTOF10: 8
PAINLEVEL_OUTOF10: 8

## 2019-05-09 ASSESSMENT — ENCOUNTER SYMPTOMS
VOMITING: 1
DIARRHEA: 1
NAUSEA: 1
ABDOMINAL PAIN: 1
RESPIRATORY NEGATIVE: 1

## 2019-05-09 NOTE — ED PROVIDER NOTES
[facility]  eMERGENCY dEPARTMENT eNCOUnter      Pt Name: Leela Grimaldo  MRN: [id number]  Ernestinagfeileen 1995  Date of evaluation: 5/9/2019  Provider: Cale Rao, [address]       Chief Complaint   Patient presents with    Abdominal Pain     x 3 days    Emesis         HISTORY OF PRESENT ILLNESS   (Location/Symptom, Timing/Onset,Context/Setting, Quality, Duration, Modifying Factors, Severity)  Note limiting factors. Gabrielle Hanna a 24 y.o. male who presents to the emergency department for evaluation of abdominal pain and vomiting. Pt tells me that he has had upper abdominal pain of moderate severity associated with nausea and vomiting over past 3 days. He denies sore throat, cough as well as recent illness. He tells me that he has had similar pain in past associated with \"stomach ulcer\". He relates that he had episode of diarrhea at onset of symptoms that has resolved. He tells me that he has had no black or bloody stools that he is aware of. He denies previous abdominal surgery. HPI    Nursing Notes were reviewed. REVIEW OF SYSTEMS    (2-9 systems for level 4, 10 or more for level 5)     Review of Systems   Constitutional: Negative for fever. Respiratory: Negative. Cardiovascular: Negative. Gastrointestinal: Positive for abdominal pain, diarrhea, nausea and vomiting. All other systems reviewed and are negative. A complete review of systems was performed and is negative except as noted above in the HPI. PAST MEDICAL HISTORY   No past medical history on file. SURGICAL HISTORY       Past Surgical History:   Procedure Laterality Date    TONSILLECTOMY           CURRENT MEDICATIONS       Previous Medications    No medications on file       ALLERGIES     Patient has no known allergies. FAMILY HISTORY     No family history on file.        SOCIAL HISTORY       Social History     Socioeconomic History    Marital status:      Spouse name: Not on file    Number of children: Not on file    Years of education: Not on file    Highest education level: Not on file   Occupational History    Not on file   Social Needs    Financial resource strain: Not on file    Food insecurity:     Worry: Not on file     Inability: Not on file    Transportation needs:     Medical: Not on file     Non-medical: Not on file   Tobacco Use    Smoking status: Heavy Tobacco Smoker     Packs/day: 1.00     Types: Cigarettes    Smokeless tobacco: Never Used   Substance and Sexual Activity    Alcohol use: Not Currently     Comment: socially    Drug use: No    Sexual activity: Not on file   Lifestyle    Physical activity:     Days per week: Not on file     Minutes per session: Not on file    Stress: Not on file   Relationships    Social connections:     Talks on phone: Not on file     Gets together: Not on file     Attends [employer] service: Not on file     Active member of club or organization: Not on file     Attends meetings of clubs or organizations: Not on file     Relationship status: Not on file    Intimate partner violence:     Fear of current or ex partner: Not on file     Emotionally abused: Not on file     Physically abused: Not on file     Forced sexual activity: Not on file   Other Topics Concern    Not on file   Social History Narrative    Not on file       SCREENINGS             PHYSICAL EXAM    (up to 7 for level 4, 8 or more for level 5)     ED Triage Vitals [05/09/19 1243]   BP Temp Temp Source Pulse Resp SpO2 Height Weight   129/75 97.2 °F (36.2 °C) Temporal 92 24 94 % 5' 11\" (1.803 m) 165 lb (74.8 kg)       Physical Exam   Constitutional: He is oriented to person, place, and time. He appears well-nourished. 24 yr old male appears in pain   HENT:   Head: Normocephalic. Right Ear: External ear normal.   Left Ear: External ear normal.   Mouth/Throat: Oropharynx is clear and moist.   Eyes: Pupils are equal, round, and reactive to light.  Conjunctivae and EOM are normal. Neck: Normal range of motion. Cardiovascular: Normal rate, regular rhythm, normal heart sounds and intact distal pulses. Pulmonary/Chest: Effort normal and breath sounds normal.   Abdominal: Soft. Bowel sounds are normal. There is tenderness (mild diffuse ttp). Musculoskeletal: Normal range of motion. Neurological: He is alert and oriented to person, place, and time. Skin: Skin is warm and dry. Vitals reviewed. DIAGNOSTIC RESULTS     EKG: All EKG's are interpreted by the Emergency Department Physician who either signs or Co-signs this chart in the absence of acardiologist.    There is a regular rate and rhythm. SR hr 50b/min Normal MO interval and normal P waves. Normal QRS interval. Normal QT interval. No obvious ST elevation or ST depression. RADIOLOGY:   Non-plain film images such as CT, Ultrasound andMRI are read by the radiologist. Plain radiographic images are visualized and preliminarily interpreted by the emergency physician with the below findings:        Interpretation per the Radiologist below, if available at the time of this note:    CT ABDOMEN PELVIS W IV CONTRAST Additional Contrast? None   Final Result   1. Minimal bladder wall thickening is likely due to underdistention. Correlation for cystitis recommended. No hydronephrosis. No evidence   of pyelonephritis. 2. No free air or abscess. Normal appendix. No bowel obstruction. 3. Old left pelvic fractures.    Signed by Dr Nathan Roper on 5/9/2019 3:29 PM            ED BEDSIDE ULTRASOUND:   Performed by ED Physician - none    LABS:  Labs Reviewed   CBC WITH AUTO DIFFERENTIAL - Abnormal; Notable for the following components:       Result Value    WBC 12.5 (*)     Neutrophils % 84.9 (*)     Lymphocytes % 10.8 (*)     Neutrophils # 10.6 (*)     All other components within normal limits   COMPREHENSIVE METABOLIC PANEL - Abnormal; Notable for the following components:    CO2 21 (*)     Calcium 10.2 (*)     All other components within normal limits   URINE RT REFLEX TO CULTURE - Abnormal; Notable for the following components:    Clarity, UA CLOUDY (*)     Ketones, Urine 80 (*)     Leukocyte Esterase, Urine SMALL (*)     All other components within normal limits   MICROSCOPIC URINALYSIS - Abnormal; Notable for the following components:    WBC, UA 10 (*)     All other components within normal limits   C.TRACHOMATIS N.GONORRHOEAE DNA   URINE CULTURE   LIPASE   LACTIC ACID, PLASMA       All other labs were within normal range or not returned as of this dictation. RE-ASSESSMENT           EMERGENCY DEPARTMENT COURSE and DIFFERENTIALDIAGNOSIS/MDM:   Vitals:    Vitals:    05/09/19 1243   BP: 129/75   Pulse: 92   Resp: 24   Temp: 97.2 °F (36.2 °C)   TempSrc: Temporal   SpO2: 94%   Weight: 165 lb (74.8 kg)   Height: 5' 11\" (1.803 m)       MDM      CONSULTS:  None    PROCEDURES:  Unless otherwise notedbelow, none     Procedures    FINAL IMPRESSION     1. Generalized abdominal pain    2. Pyuria    3. Non-intractable vomiting with nausea, unspecified vomiting type          DISPOSITION/PLAN   DISPOSITION        PATIENT REFERRED TO:  Logan Randolph MD  [address] [facility]  [address] [id number]  [phone]    Schedule an appointment as soon as possible for a visit       [facility] [place] [address] [phone]  Schedule an appointment as soon as possible for a visit         DISCHARGE MEDICATIONS:       Current Discharge Medication List           Medication List      START taking these medications    omeprazole 20 MG delayed release capsule  Commonly known as:  PRILOSEC  Take 1 capsule by mouth 2 times daily     ondansetron 4 MG disintegrating tablet  Commonly known as:  ZOFRAN ODT  Take 1 tablet by mouth every 8 hours as needed for Nausea or Vomiting     sucralfate 1 GM tablet  Commonly known as:  CARAFATE  Take 1 tablet by mouth 4 times daily        CHANGE how you take these medications dicyclomine 20 MG tablet  Commonly known as:  BENTYL  Take 1 tablet by mouth 4 times daily  What changed:  when to take this           Where to Get Your Medications      You can get these medications from any pharmacy    Bring a paper prescription for each of these medications  · dicyclomine 20 MG tablet  · omeprazole 20 MG delayed release capsule  · ondansetron 4 MG disintegrating tablet  · sucralfate 1 GM tablet           (Pleasenote that portions of this note were completed with a voice recognition program.  Efforts were made to edit the dictations but occasionally words are mis-transcribed.)              Lacy Coy, APRN  05/09/19 4429

## 2019-05-10 LAB
EKG P AXIS: 14 DEGREES
EKG P-R INTERVAL: 146 MS
EKG Q-T INTERVAL: 430 MS
EKG QRS DURATION: 94 MS
EKG QTC CALCULATION (BAZETT): 411 MS
EKG T AXIS: 66 DEGREES

## 2019-05-11 LAB — URINE CULTURE, ROUTINE: NORMAL

## 2019-05-12 LAB
APTIMA MEDIA TYPE: ABNORMAL
CHLAMYDIA TRACHOMATIS AMPLIFIED DET: POSITIVE
N GONORRHOEAE AMPLIFIED DET: NEGATIVE
SPECIMEN SOURCE: ABNORMAL

## 2019-10-21 ENCOUNTER — HOSPITAL ENCOUNTER (EMERGENCY)
Age: 24
Discharge: HOME | End: 2019-10-21

## 2019-10-21 ENCOUNTER — APPOINTMENT (OUTPATIENT)
Dept: GENERAL RADIOLOGY | Age: 24
End: 2019-10-21

## 2019-10-21 VITALS
RESPIRATION RATE: 20 BRPM | BODY MASS INDEX: 23.1 KG/M2 | WEIGHT: 165 LBS | HEIGHT: 71 IN | HEART RATE: 99 BPM | DIASTOLIC BLOOD PRESSURE: 90 MMHG | OXYGEN SATURATION: 98 % | TEMPERATURE: 99.1 F | SYSTOLIC BLOOD PRESSURE: 151 MMHG

## 2019-10-21 DIAGNOSIS — S39.012A BACK STRAIN, INITIAL ENCOUNTER: Primary | ICD-10-CM

## 2019-10-21 PROCEDURE — 99283 EMERGENCY DEPT VISIT LOW MDM: CPT

## 2019-10-21 PROCEDURE — 6370000000 HC RX 637 (ALT 250 FOR IP): Performed by: NURSE PRACTITIONER

## 2019-10-21 PROCEDURE — 6360000002 HC RX W HCPCS: Performed by: NURSE PRACTITIONER

## 2019-10-21 PROCEDURE — 96372 THER/PROPH/DIAG INJ SC/IM: CPT

## 2019-10-21 PROCEDURE — 72072 X-RAY EXAM THORAC SPINE 3VWS: CPT

## 2019-10-21 RX ORDER — IBUPROFEN 800 MG/1
800 TABLET ORAL EVERY 8 HOURS PRN
Qty: 30 TABLET | Refills: 0 | Status: SHIPPED | OUTPATIENT
Start: 2019-10-21 | End: 2022-03-09

## 2019-10-21 RX ORDER — MORPHINE SULFATE 4 MG/ML
4 INJECTION, SOLUTION INTRAMUSCULAR; INTRAVENOUS ONCE
Status: COMPLETED | OUTPATIENT
Start: 2019-10-21 | End: 2019-10-21

## 2019-10-21 RX ORDER — ORPHENADRINE CITRATE 30 MG/ML
60 INJECTION INTRAMUSCULAR; INTRAVENOUS ONCE
Status: COMPLETED | OUTPATIENT
Start: 2019-10-21 | End: 2019-10-21

## 2019-10-21 RX ORDER — TIZANIDINE 4 MG/1
4 TABLET ORAL EVERY 8 HOURS PRN
Qty: 30 TABLET | Refills: 0 | Status: SHIPPED | OUTPATIENT
Start: 2019-10-21

## 2019-10-21 RX ORDER — ONDANSETRON 4 MG/1
4 TABLET, ORALLY DISINTEGRATING ORAL ONCE
Status: COMPLETED | OUTPATIENT
Start: 2019-10-21 | End: 2019-10-21

## 2019-10-21 RX ORDER — DEXAMETHASONE SODIUM PHOSPHATE 10 MG/ML
10 INJECTION, SOLUTION INTRAMUSCULAR; INTRAVENOUS ONCE
Status: COMPLETED | OUTPATIENT
Start: 2019-10-21 | End: 2019-10-21

## 2019-10-21 RX ADMIN — ORPHENADRINE CITRATE 60 MG: 30 INJECTION INTRAMUSCULAR; INTRAVENOUS at 22:50

## 2019-10-21 RX ADMIN — ONDANSETRON 4 MG: 4 TABLET, ORALLY DISINTEGRATING ORAL at 22:50

## 2019-10-21 RX ADMIN — MORPHINE SULFATE 4 MG: 4 INJECTION INTRAVENOUS at 22:50

## 2019-10-21 RX ADMIN — DEXAMETHASONE SODIUM PHOSPHATE 10 MG: 10 INJECTION, SOLUTION INTRAMUSCULAR; INTRAVENOUS at 22:50

## 2019-10-21 ASSESSMENT — ENCOUNTER SYMPTOMS
ABDOMINAL PAIN: 0
APNEA: 0
BACK PAIN: 1
BLOOD IN STOOL: 0
CHEST TIGHTNESS: 0
STRIDOR: 0
SINUS PAIN: 0
ABDOMINAL DISTENTION: 0
COLOR CHANGE: 0
EYE PAIN: 0
EYE DISCHARGE: 0
CONSTIPATION: 0
NAUSEA: 0
PHOTOPHOBIA: 0
RECTAL PAIN: 0
EYE REDNESS: 0
DIARRHEA: 0
WHEEZING: 0
SHORTNESS OF BREATH: 0
VOMITING: 0
SORE THROAT: 0

## 2019-10-21 ASSESSMENT — PAIN SCALES - GENERAL
PAINLEVEL_OUTOF10: 7
PAINLEVEL_OUTOF10: 5

## 2019-10-21 ASSESSMENT — PAIN DESCRIPTION - PAIN TYPE: TYPE: ACUTE PAIN

## 2019-10-21 ASSESSMENT — PAIN DESCRIPTION - DESCRIPTORS: DESCRIPTORS: TIGHTNESS

## 2019-10-21 ASSESSMENT — PAIN DESCRIPTION - LOCATION: LOCATION: BACK

## 2019-11-03 ENCOUNTER — APPOINTMENT (OUTPATIENT)
Dept: CT IMAGING | Facility: HOSPITAL | Age: 24
End: 2019-11-03

## 2019-11-03 ENCOUNTER — HOSPITAL ENCOUNTER (EMERGENCY)
Facility: HOSPITAL | Age: 24
Discharge: HOME | End: 2019-11-03
Attending: EMERGENCY MEDICINE | Admitting: EMERGENCY MEDICINE

## 2019-11-03 VITALS
HEART RATE: 50 BPM | SYSTOLIC BLOOD PRESSURE: 135 MMHG | BODY MASS INDEX: 22.9 KG/M2 | WEIGHT: 160 LBS | TEMPERATURE: 98.7 F | DIASTOLIC BLOOD PRESSURE: 69 MMHG | OXYGEN SATURATION: 98 % | RESPIRATION RATE: 18 BRPM | HEIGHT: 70 IN

## 2019-11-03 DIAGNOSIS — R11.2 NON-INTRACTABLE VOMITING WITH NAUSEA, UNSPECIFIED VOMITING TYPE: Primary | ICD-10-CM

## 2019-11-03 LAB
ALBUMIN SERPL-MCNC: 4.7 G/DL (ref 3.5–5.2)
ALBUMIN/GLOB SERPL: 1.8 G/DL
ALP SERPL-CCNC: 74 U/L (ref 39–117)
ALT SERPL W P-5'-P-CCNC: 9 U/L (ref 1–41)
AMPHET+METHAMPHET UR QL: NEGATIVE
AMPHETAMINES UR QL: NEGATIVE
ANION GAP SERPL CALCULATED.3IONS-SCNC: 16 MMOL/L (ref 5–15)
AST SERPL-CCNC: 14 U/L (ref 1–40)
BARBITURATES UR QL SCN: NEGATIVE
BASOPHILS # BLD AUTO: 0.04 10*3/MM3 (ref 0–0.2)
BASOPHILS NFR BLD AUTO: 0.3 % (ref 0–1.5)
BENZODIAZ UR QL SCN: NEGATIVE
BILIRUB SERPL-MCNC: 0.6 MG/DL (ref 0.2–1.2)
BUN BLD-MCNC: 8 MG/DL (ref 6–20)
BUN/CREAT SERPL: 11.8 (ref 7–25)
BUPRENORPHINE SERPL-MCNC: POSITIVE NG/ML
CALCIUM SPEC-SCNC: 9.5 MG/DL (ref 8.6–10.5)
CANNABINOIDS SERPL QL: NEGATIVE
CHLORIDE SERPL-SCNC: 102 MMOL/L (ref 98–107)
CO2 SERPL-SCNC: 22 MMOL/L (ref 22–29)
COCAINE UR QL: NEGATIVE
CREAT BLD-MCNC: 0.68 MG/DL (ref 0.76–1.27)
D-LACTATE SERPL-SCNC: 1.7 MMOL/L (ref 0.5–2)
DEPRECATED RDW RBC AUTO: 45.4 FL (ref 37–54)
EOSINOPHIL # BLD AUTO: 0.04 10*3/MM3 (ref 0–0.4)
EOSINOPHIL NFR BLD AUTO: 0.3 % (ref 0.3–6.2)
ERYTHROCYTE [DISTWIDTH] IN BLOOD BY AUTOMATED COUNT: 13.9 % (ref 12.3–15.4)
ETHANOL UR QL: <0.01 %
GFR SERPL CREATININE-BSD FRML MDRD: 142 ML/MIN/1.73
GLOBULIN UR ELPH-MCNC: 2.6 GM/DL
GLUCOSE BLD-MCNC: 112 MG/DL (ref 65–99)
HCT VFR BLD AUTO: 40.6 % (ref 37.5–51)
HGB BLD-MCNC: 14.1 G/DL (ref 13–17.7)
IMM GRANULOCYTES # BLD AUTO: 0.05 10*3/MM3 (ref 0–0.05)
IMM GRANULOCYTES NFR BLD AUTO: 0.4 % (ref 0–0.5)
LIPASE SERPL-CCNC: 27 U/L (ref 13–60)
LYMPHOCYTES # BLD AUTO: 1.01 10*3/MM3 (ref 0.7–3.1)
LYMPHOCYTES NFR BLD AUTO: 8.5 % (ref 19.6–45.3)
MCH RBC QN AUTO: 30.9 PG (ref 26.6–33)
MCHC RBC AUTO-ENTMCNC: 34.7 G/DL (ref 31.5–35.7)
MCV RBC AUTO: 89 FL (ref 79–97)
METHADONE UR QL SCN: NEGATIVE
MONOCYTES # BLD AUTO: 0.49 10*3/MM3 (ref 0.1–0.9)
MONOCYTES NFR BLD AUTO: 4.1 % (ref 5–12)
NEUTROPHILS # BLD AUTO: 10.31 10*3/MM3 (ref 1.7–7)
NEUTROPHILS NFR BLD AUTO: 86.4 % (ref 42.7–76)
NRBC BLD AUTO-RTO: 0 /100 WBC (ref 0–0.2)
OPIATES UR QL: POSITIVE
OXYCODONE UR QL SCN: NEGATIVE
PCP UR QL SCN: NEGATIVE
PLATELET # BLD AUTO: 289 10*3/MM3 (ref 140–450)
PMV BLD AUTO: 9.8 FL (ref 6–12)
POTASSIUM BLD-SCNC: 3.5 MMOL/L (ref 3.5–5.2)
PROCALCITONIN SERPL-MCNC: 0.12 NG/ML (ref 0.1–0.25)
PROPOXYPH UR QL: NEGATIVE
PROT SERPL-MCNC: 7.3 G/DL (ref 6–8.5)
RBC # BLD AUTO: 4.56 10*6/MM3 (ref 4.14–5.8)
SODIUM BLD-SCNC: 140 MMOL/L (ref 136–145)
TRICYCLICS UR QL SCN: NEGATIVE
WBC NRBC COR # BLD: 11.94 10*3/MM3 (ref 3.4–10.8)

## 2019-11-03 PROCEDURE — 84145 PROCALCITONIN (PCT): CPT | Performed by: EMERGENCY MEDICINE

## 2019-11-03 PROCEDURE — 83605 ASSAY OF LACTIC ACID: CPT | Performed by: EMERGENCY MEDICINE

## 2019-11-03 PROCEDURE — 85025 COMPLETE CBC W/AUTO DIFF WBC: CPT | Performed by: EMERGENCY MEDICINE

## 2019-11-03 PROCEDURE — 74177 CT ABD & PELVIS W/CONTRAST: CPT

## 2019-11-03 PROCEDURE — 96374 THER/PROPH/DIAG INJ IV PUSH: CPT

## 2019-11-03 PROCEDURE — 80307 DRUG TEST PRSMV CHEM ANLYZR: CPT | Performed by: EMERGENCY MEDICINE

## 2019-11-03 PROCEDURE — 25010000002 IOPAMIDOL 61 % SOLUTION: Performed by: EMERGENCY MEDICINE

## 2019-11-03 PROCEDURE — 25010000002 KETOROLAC TROMETHAMINE PER 15 MG: Performed by: EMERGENCY MEDICINE

## 2019-11-03 PROCEDURE — 99284 EMERGENCY DEPT VISIT MOD MDM: CPT

## 2019-11-03 PROCEDURE — 25010000002 PROMETHAZINE PER 50 MG: Performed by: EMERGENCY MEDICINE

## 2019-11-03 PROCEDURE — 83690 ASSAY OF LIPASE: CPT | Performed by: EMERGENCY MEDICINE

## 2019-11-03 PROCEDURE — 80053 COMPREHEN METABOLIC PANEL: CPT | Performed by: EMERGENCY MEDICINE

## 2019-11-03 PROCEDURE — 96375 TX/PRO/DX INJ NEW DRUG ADDON: CPT

## 2019-11-03 PROCEDURE — 96376 TX/PRO/DX INJ SAME DRUG ADON: CPT

## 2019-11-03 RX ORDER — FAMOTIDINE 10 MG/ML
20 INJECTION, SOLUTION INTRAVENOUS ONCE
Status: COMPLETED | OUTPATIENT
Start: 2019-11-03 | End: 2019-11-03

## 2019-11-03 RX ORDER — KETOROLAC TROMETHAMINE 30 MG/ML
30 INJECTION, SOLUTION INTRAMUSCULAR; INTRAVENOUS ONCE
Status: COMPLETED | OUTPATIENT
Start: 2019-11-03 | End: 2019-11-03

## 2019-11-03 RX ORDER — PROMETHAZINE HYDROCHLORIDE 25 MG/1
25 TABLET ORAL EVERY 6 HOURS PRN
Qty: 10 TABLET | Refills: 0 | Status: SHIPPED | OUTPATIENT
Start: 2019-11-03 | End: 2019-12-20

## 2019-11-03 RX ORDER — PROMETHAZINE HYDROCHLORIDE 25 MG/ML
12.5 INJECTION, SOLUTION INTRAMUSCULAR; INTRAVENOUS ONCE
Status: COMPLETED | OUTPATIENT
Start: 2019-11-03 | End: 2019-11-03

## 2019-11-03 RX ORDER — LANSOPRAZOLE 15 MG/1
30 CAPSULE, DELAYED RELEASE ORAL DAILY
Qty: 20 CAPSULE | Refills: 0 | Status: SHIPPED | OUTPATIENT
Start: 2019-11-03 | End: 2019-11-13

## 2019-11-03 RX ADMIN — SODIUM CHLORIDE 1000 ML: 9 INJECTION, SOLUTION INTRAVENOUS at 10:22

## 2019-11-03 RX ADMIN — IOPAMIDOL 50 ML: 612 INJECTION, SOLUTION INTRAVENOUS at 10:15

## 2019-11-03 RX ADMIN — PROMETHAZINE HYDROCHLORIDE 12.5 MG: 25 INJECTION INTRAMUSCULAR; INTRAVENOUS at 10:08

## 2019-11-03 RX ADMIN — FAMOTIDINE 20 MG: 10 INJECTION INTRAVENOUS at 10:15

## 2019-11-03 RX ADMIN — IOPAMIDOL 100 ML: 612 INJECTION, SOLUTION INTRAVENOUS at 11:57

## 2019-11-03 RX ADMIN — KETOROLAC TROMETHAMINE 30 MG: 30 INJECTION, SOLUTION INTRAMUSCULAR; INTRAVENOUS at 10:15

## 2019-11-03 RX ADMIN — PROMETHAZINE HYDROCHLORIDE 12.5 MG: 25 INJECTION INTRAMUSCULAR; INTRAVENOUS at 11:18

## 2019-11-03 NOTE — ED PROVIDER NOTES
Subjective   Patient with vomiting has had 3 similar episode in the past has not seen a subspecialist came into the ER complain of severe vomiting denies any drug abuse he is rolling around in the bed has not vomited once in the ER is not tachycardic not tachypneic not hypertensive        Vomiting   The primary symptoms include abdominal pain, nausea and vomiting. Primary symptoms do not include fever, weight loss, fatigue, jaundice, hematochezia, myalgias, arthralgias or rash. The illness began today.   The illness does not include chills, anorexia, dysphagia, odynophagia, bloating, constipation, tenesmus, back pain or itching. Associated medical issues do not include inflammatory bowel disease, GERD, gallstones, alcohol abuse, bowel resection, irritable bowel syndrome, hemorrhoids or diverticulitis.       Review of Systems   Constitutional: Negative.  Negative for chills, fatigue, fever and weight loss.   HENT: Negative.    Eyes: Negative.    Respiratory: Negative.    Cardiovascular: Negative.    Gastrointestinal: Positive for abdominal pain, nausea and vomiting. Negative for anorexia, bloating, constipation, dysphagia, hematochezia and jaundice.   Endocrine: Negative.    Genitourinary: Negative.    Musculoskeletal: Negative for arthralgias, back pain and myalgias.   Skin: Negative.  Negative for itching and rash.   Neurological: Negative.    Hematological: Negative.    All other systems reviewed and are negative.      Past Medical History:   Diagnosis Date   • Patient denies medical problems        No Known Allergies    Past Surgical History:   Procedure Laterality Date   • NO PAST SURGERIES     • TONSILLECTOMY         History reviewed. No pertinent family history.    Social History     Socioeconomic History   • Marital status: Legally      Spouse name: Not on file   • Number of children: Not on file   • Years of education: Not on file   • Highest education level: Not on file   Tobacco Use   • Smoking  status: Current Every Day Smoker     Packs/day: 1.00   Substance and Sexual Activity   • Alcohol use: Yes     Comment: occ   • Drug use: No   • Sexual activity: Defer           Objective   Physical Exam   Constitutional: He is oriented to person, place, and time. He appears well-developed and well-nourished.  Non-toxic appearance.   HENT:   Head: Normocephalic and atraumatic.   Mouth/Throat: Oropharynx is clear and moist.   Eyes: Conjunctivae are normal. Pupils are equal, round, and reactive to light.   Neck: Normal range of motion. Neck supple. No hepatojugular reflux and no JVD present.   Cardiovascular: Normal rate, regular rhythm, normal heart sounds and intact distal pulses. PMI is not displaced. Exam reveals no decreased pulses.   No murmur heard.  Pulmonary/Chest: Effort normal and breath sounds normal. No accessory muscle usage. No apnea. No respiratory distress. He has no decreased breath sounds. He has no wheezes.   Abdominal: Normal appearance, normal aorta and bowel sounds are normal. He exhibits no shifting dullness, no distension, no fluid wave, no abdominal bruit, no ascites, no pulsatile midline mass and no mass. There is tenderness. There is no guarding.   Musculoskeletal: Normal range of motion.   Neurological: He is alert and oriented to person, place, and time. He has normal strength and normal reflexes. No cranial nerve deficit. GCS eye subscore is 4. GCS verbal subscore is 5. GCS motor subscore is 6.   Skin: Skin is warm and dry.   Psychiatric: He has a normal mood and affect. His behavior is normal.   Nursing note and vitals reviewed.      Procedures           ED Course  ED Course as of Nov 03 1504   Sun Nov 03, 2019   1404 Slightly heterogeneous appearance of the liver with no liver mass,  mild intrahepatic periportal edema, which is nonspecific but can be seen  with hepatitis. Clinical correlation is recommended.  2. Small amount of free fluid in the pelvis, but no abscess. No free air  is  identified.  3. Normal appendix.  4. Distal constipation. No evidence of bowel obstruction.  CT was discussed at length with the patient there is no clinical or pharmacological evidence of hepatitis  [TS]   1500 Patient is hemodynamically stable appears to be in no distress he is not tachycardic or tachypneic he is been resting until time somebody go in the room and then he starts complaining of pain the family said he has vomited we do not see any obvious evidence of vomiting.  His lab work-up essentially is negative except for opioid positive drug screen white cell count is normal chemistries are normal lactic acid is normal procalcitonin level is normal lipase is normal  [TS]   1501 As a said there is no evidence of any hepatitis on clinical examination the patient is saying that he still having abdominal discomfort.  I have advised him to follow-up with the primary MD and return the of any worsening symptoms and to follow-up with a GI specialist the patient wants pain medication to be given to him and I have advised him against giving pain medications blindly associate with a negative work-up he does not have any clinical reasoning to be admitted to the hospital.  There is no evidence of dehydration clinically or on biochemical profile  [TS]   1502 Risks and benefits of treatments given and alternative treatment options discussed with patient/family. I answered all the questions in simple, plain language, and there was voiced understanding and agreement with plan of care. There were no further questions. Differential diagnosis discussed. Patient/family was advised that the practice of medicine is not always an exact science, and sometimes tests, physical exam, or history may not show the underlying conditions with certainty. Additionally, the condition may change or show itself later after initial presentation. There was also expressed understanding and agreement with this limitation of emergency medicine  practice. Patient/family was asked to return to ED if any problem or issues or if condition worsens or does not improved. Patient/family agreed to follow up with PCP/specialist as advised, or return to ED if unable to see a provider in a timely fashion for continued symptoms.   [TS]      ED Course User Index  [TS] Ashok Easley MD                  [facility]    Final diagnoses:   Non-intractable vomiting with nausea, unspecified vomiting type              Ashok Easley MD  11/03/19 9396

## 2019-11-03 NOTE — DISCHARGE INSTRUCTIONS
Nausea, Adult  Nausea is feeling sick to your stomach or feeling that you are about to throw up (vomit). Feeling sick to your stomach is usually not serious, but it may be an early sign of a more serious medical problem.  As you feel sicker to your stomach, you may throw up. If you throw up, or if you are not able to drink enough fluids, there is a risk that you may lose too much water in your body (get dehydrated). If you lose too much water in your body, you may:  · Feel tired.  · Feel thirsty.  · Have a dry mouth.  · Have cracked lips.  · Go pee (urinate) less often.  Older adults and people who have other diseases or a weak body defense system (immune system) have a higher risk of losing too much water in the body.  The main goals of treating this condition are:  · To relieve your nausea.  · To ensure your nausea occurs less often.  · To prevent throwing up and losing too much fluid.  Follow these instructions at home:  Watch your symptoms for any changes. Tell your doctor about them. Follow these instructions as told by your doctor.  Eating and drinking         · Take an ORS (oral rehydration solution). This is a drink that is sold at pharmacies and stores.  · Drink clear fluids in small amounts as you are able. These include:  ? Water.  ? Ice chips.  ? Fruit juice that has water added (diluted fruit juice).  ? Low-calorie sports drinks.  · Eat bland, easy-to-digest foods in small amounts as you are able, such as:  ? Bananas.  ? Applesauce.  ? Rice.  ? Low-fat (lean) meats.  ? Toast.  ? Crackers.  · Avoid drinking fluids that have a lot of sugar or caffeine in them. This includes energy drinks, sports drinks, and soda.  · Avoid alcohol.  · Avoid spicy or fatty foods.  General instructions  · Take over-the-counter and prescription medicines only as told by your doctor.  · Rest at home while you get better.  · Drink enough fluid to keep your pee (urine) pale yellow.  · Take slow and deep breaths when you feel  sick to your stomach.  · Avoid food or things that have strong smells.  · Wash your hands often with soap and water. If you cannot use soap and water, use hand .  · Make sure that all people in your home wash their hands well and often.  · Keep all follow-up visits as told by your doctor. This is important.  Contact a doctor if:  · You feel sicker to your stomach.  · You feel sick to your stomach for more than 2 days.  · You throw up.  · You are not able to drink fluids without throwing up.  · You have new symptoms.  · You have a fever.  · You have a headache.  · You have muscle cramps.  · You have a rash.  · You have pain while peeing.  · You feel light-headed or dizzy.  Get help right away if:  · You have pain in your chest, neck, arm, or jaw.  · You feel very weak or you pass out (faint).  · You have throw up that is bright red or looks like coffee grounds.  · You have bloody or black poop (stools) or poop that looks like tar.  · You have a very bad headache, a stiff neck, or both.  · You have very bad pain, cramping, or bloating in your belly (abdomen).  · You have trouble breathing or you are breathing very quickly.  · Your heart is beating very quickly.  · Your skin feels cold and clammy.  · You feel confused.  · You have signs of losing too much water in your body, such as:  ? Dark pee, very little pee, or no pee.  ? Cracked lips.  ? Dry mouth.  ? Sunken eyes.  ? Sleepiness.  ? Weakness.  These symptoms may be an emergency. Do not wait to see if the symptoms will go away. Get medical help right away. Call your local emergency services (911 in the [place] Do not drive yourself to the hospital.  Summary  · Nausea is feeling sick to your stomach or feeling that you are about to throw up (vomit).  · If you throw up, or if you are not able to drink enough fluids, there is a risk that you may lose too much water in your body (get dehydrated).  · Eat and drink what your doctor tells you. Take  over-the-counter and prescription medicines only as told by your doctor.  · Contact a doctor right away if your symptoms get worse or you have new symptoms.  · Keep all follow-up visits as told by your doctor. This is important.  This information is not intended to replace advice given to you by your health care provider. Make sure you discuss any questions you have with your health care provider.  Document Released: 12/06/2012 Document Revised: 05/28/2019 Document Reviewed: 05/28/2019  [employer] Interactive Patient Education © 2019 [employer]  Why Narcotic/Opioid Medications Were Not Prescribed   Your emergency physician thinks that your pain will be best managed with non-opioid medications. Non-opioid medications, such as ibuprofen (Motrin, Advil), naproxen (Aleve), or acetaminophen (Tylenol), may be as, or more, effective than opioids, especially for chronic pain. They are also much safer.  Important facts to know about prescription opioids  Common opioids include oxycodone (Percocet, OxyContin), hydrocodone (Vicodin, Norco), and morphine (MSContin).   Opioid addiction is a national concern. The Centers for Disease Control and Prevention (CDC) and the [place] Surgeon General recommend that physicians reduce opioid prescribing due to a national epidemic of opioid dependence and overdose.   Opioid drugs are addictive, even in small doses.   Opioid overdoses are common: 78 Americans die every day from an opioid overdose. More Americans die from opioid overdose than from motor vehicle accidents or guns.   Most opioid overdoses are accidental.   Opioid drugs are often ineffective for pain, particularly chronic pain, and may make you more sensitive to pain.  Pain Management Alternatives  If you have pain, try taking over-the-counter medications such as ibuprofen (Motrin, Advil), naproxen (Aleve), or acetaminophen (Tylenol) as directed, or your emergency physician may have prescribed you a non-opioid medication.  Always  take only as much medication as directed, as taking too much can be harmful to your health.  You may find that massage and acupuncture help some kinds of pain. Heat and cold compresses can also be helpful with some types of pain. Regular stretching, over-the-counter creams and lotions (such as capsaicin, lidocaine, or salicylic acid), or lidocaine patches may also be helpful.  Other non-opioid alternatives for pain control to consider include Cognitive Behavioral Therapy (CBT), Biofeedback, and Exercise Therapy. More information is available from the CDC at: https://www.cdc.gov/drugoverdose/pdf/alternative_treatments-a.pdf   Schedule a follow-up appointment with your primary care provider within one week to discuss your emergency room visit and to re-evaluate your pain.  Return to the emergency room if you experience new pain, severely worsening pain, or any other new or concerning symptoms.  Concerned about opioid addiction? Find out more  Behavioral Health Treatment Services : https://findtreatment.sama.gov/   Seeking Drug Abuse Treatment: Know What to Ask. https://www.drugabuse.gov/sites/default/files/treatmentbrochure_web.pdf   Substance Abuse and Mental Health Services Administration’s treatment help line: at (215) 121-DVJX985481  Created by the Public Health and Injury Prevention Committee - Reviewed by the Board of Directors June 2017

## 2019-12-20 ENCOUNTER — APPOINTMENT (OUTPATIENT)
Dept: CT IMAGING | Facility: HOSPITAL | Age: 24
End: 2019-12-20

## 2019-12-20 ENCOUNTER — HOSPITAL ENCOUNTER (EMERGENCY)
Facility: HOSPITAL | Age: 24
Discharge: HOME | End: 2019-12-20
Admitting: EMERGENCY MEDICINE

## 2019-12-20 VITALS
SYSTOLIC BLOOD PRESSURE: 142 MMHG | TEMPERATURE: 99.1 F | DIASTOLIC BLOOD PRESSURE: 76 MMHG | OXYGEN SATURATION: 100 % | RESPIRATION RATE: 16 BRPM | HEART RATE: 64 BPM | HEIGHT: 68 IN | BODY MASS INDEX: 23.49 KG/M2 | WEIGHT: 155 LBS

## 2019-12-20 DIAGNOSIS — K52.9 GASTROENTERITIS: Primary | ICD-10-CM

## 2019-12-20 LAB
ALBUMIN SERPL-MCNC: 5 G/DL (ref 3.5–5.2)
ALBUMIN/GLOB SERPL: 1.8 G/DL
ALP SERPL-CCNC: 72 U/L (ref 39–117)
ALT SERPL W P-5'-P-CCNC: 11 U/L (ref 1–41)
AMYLASE SERPL-CCNC: 30 U/L (ref 28–100)
ANION GAP SERPL CALCULATED.3IONS-SCNC: 18 MMOL/L (ref 5–15)
APTT PPP: 24.4 SECONDS (ref 24.1–35)
AST SERPL-CCNC: 16 U/L (ref 1–40)
BACTERIA UR QL AUTO: ABNORMAL /HPF
BASOPHILS # BLD AUTO: 0.03 10*3/MM3 (ref 0–0.2)
BASOPHILS NFR BLD AUTO: 0.2 % (ref 0–1.5)
BILIRUB SERPL-MCNC: 0.7 MG/DL (ref 0.2–1.2)
BILIRUB UR QL STRIP: NEGATIVE
BUN BLD-MCNC: 9 MG/DL (ref 6–20)
BUN/CREAT SERPL: 13.6 (ref 7–25)
CALCIUM SPEC-SCNC: 10 MG/DL (ref 8.6–10.5)
CHLORIDE SERPL-SCNC: 105 MMOL/L (ref 98–107)
CLARITY UR: CLEAR
CO2 SERPL-SCNC: 19 MMOL/L (ref 22–29)
COLOR UR: YELLOW
CREAT BLD-MCNC: 0.66 MG/DL (ref 0.76–1.27)
DEPRECATED RDW RBC AUTO: 41.9 FL (ref 37–54)
EOSINOPHIL # BLD AUTO: 0 10*3/MM3 (ref 0–0.4)
EOSINOPHIL NFR BLD AUTO: 0 % (ref 0.3–6.2)
ERYTHROCYTE [DISTWIDTH] IN BLOOD BY AUTOMATED COUNT: 13.2 % (ref 12.3–15.4)
FLUAV AG NPH QL: NEGATIVE
FLUBV AG NPH QL IA: NEGATIVE
GFR SERPL CREATININE-BSD FRML MDRD: 147 ML/MIN/1.73
GLOBULIN UR ELPH-MCNC: 2.8 GM/DL
GLUCOSE BLD-MCNC: 136 MG/DL (ref 65–99)
GLUCOSE UR STRIP-MCNC: NEGATIVE MG/DL
HCT VFR BLD AUTO: 41.7 % (ref 37.5–51)
HGB BLD-MCNC: 14.7 G/DL (ref 13–17.7)
HGB UR QL STRIP.AUTO: ABNORMAL
HOLD SPECIMEN: NORMAL
HOLD SPECIMEN: NORMAL
HYALINE CASTS UR QL AUTO: ABNORMAL /LPF
IMM GRANULOCYTES # BLD AUTO: 0.09 10*3/MM3 (ref 0–0.05)
IMM GRANULOCYTES NFR BLD AUTO: 0.5 % (ref 0–0.5)
INR PPP: 1.09 (ref 0.91–1.09)
KETONES UR QL STRIP: ABNORMAL
LEUKOCYTE ESTERASE UR QL STRIP.AUTO: NEGATIVE
LIPASE SERPL-CCNC: 7 U/L (ref 13–60)
LYMPHOCYTES # BLD AUTO: 0.88 10*3/MM3 (ref 0.7–3.1)
LYMPHOCYTES NFR BLD AUTO: 4.9 % (ref 19.6–45.3)
MCH RBC QN AUTO: 30.6 PG (ref 26.6–33)
MCHC RBC AUTO-ENTMCNC: 35.3 G/DL (ref 31.5–35.7)
MCV RBC AUTO: 86.7 FL (ref 79–97)
MONOCYTES # BLD AUTO: 0.48 10*3/MM3 (ref 0.1–0.9)
MONOCYTES NFR BLD AUTO: 2.7 % (ref 5–12)
NEUTROPHILS # BLD AUTO: 16.43 10*3/MM3 (ref 1.7–7)
NEUTROPHILS NFR BLD AUTO: 91.7 % (ref 42.7–76)
NITRITE UR QL STRIP: NEGATIVE
NRBC BLD AUTO-RTO: 0 /100 WBC (ref 0–0.2)
PH UR STRIP.AUTO: 7 [PH] (ref 5–8)
PLATELET # BLD AUTO: 359 10*3/MM3 (ref 140–450)
PMV BLD AUTO: 9.6 FL (ref 6–12)
POTASSIUM BLD-SCNC: 4 MMOL/L (ref 3.5–5.2)
PROT SERPL-MCNC: 7.8 G/DL (ref 6–8.5)
PROT UR QL STRIP: NEGATIVE
PROTHROMBIN TIME: 14.5 SECONDS (ref 11.9–14.6)
RBC # BLD AUTO: 4.81 10*6/MM3 (ref 4.14–5.8)
RBC # UR: ABNORMAL /HPF
REF LAB TEST METHOD: ABNORMAL
SODIUM BLD-SCNC: 142 MMOL/L (ref 136–145)
SP GR UR STRIP: >1.03 (ref 1–1.03)
SQUAMOUS #/AREA URNS HPF: ABNORMAL /HPF
UROBILINOGEN UR QL STRIP: ABNORMAL
WBC NRBC COR # BLD: 17.91 10*3/MM3 (ref 3.4–10.8)
WBC UR QL AUTO: ABNORMAL /HPF
WHOLE BLOOD HOLD SPECIMEN: NORMAL
WHOLE BLOOD HOLD SPECIMEN: NORMAL

## 2019-12-20 PROCEDURE — 85610 PROTHROMBIN TIME: CPT | Performed by: NURSE PRACTITIONER

## 2019-12-20 PROCEDURE — 96375 TX/PRO/DX INJ NEW DRUG ADDON: CPT

## 2019-12-20 PROCEDURE — 96374 THER/PROPH/DIAG INJ IV PUSH: CPT

## 2019-12-20 PROCEDURE — 85730 THROMBOPLASTIN TIME PARTIAL: CPT | Performed by: NURSE PRACTITIONER

## 2019-12-20 PROCEDURE — 85025 COMPLETE CBC W/AUTO DIFF WBC: CPT | Performed by: NURSE PRACTITIONER

## 2019-12-20 PROCEDURE — 81001 URINALYSIS AUTO W/SCOPE: CPT | Performed by: NURSE PRACTITIONER

## 2019-12-20 PROCEDURE — 82150 ASSAY OF AMYLASE: CPT | Performed by: NURSE PRACTITIONER

## 2019-12-20 PROCEDURE — 25010000002 ONDANSETRON PER 1 MG: Performed by: NURSE PRACTITIONER

## 2019-12-20 PROCEDURE — 96376 TX/PRO/DX INJ SAME DRUG ADON: CPT

## 2019-12-20 PROCEDURE — 99284 EMERGENCY DEPT VISIT MOD MDM: CPT

## 2019-12-20 PROCEDURE — 25010000002 IOPAMIDOL 61 % SOLUTION: Performed by: NURSE PRACTITIONER

## 2019-12-20 PROCEDURE — 74177 CT ABD & PELVIS W/CONTRAST: CPT

## 2019-12-20 PROCEDURE — 80053 COMPREHEN METABOLIC PANEL: CPT | Performed by: NURSE PRACTITIONER

## 2019-12-20 PROCEDURE — 83690 ASSAY OF LIPASE: CPT | Performed by: NURSE PRACTITIONER

## 2019-12-20 PROCEDURE — 87804 INFLUENZA ASSAY W/OPTIC: CPT | Performed by: NURSE PRACTITIONER

## 2019-12-20 RX ORDER — ONDANSETRON 2 MG/ML
4 INJECTION INTRAMUSCULAR; INTRAVENOUS ONCE
Status: COMPLETED | OUTPATIENT
Start: 2019-12-20 | End: 2019-12-20

## 2019-12-20 RX ORDER — SODIUM CHLORIDE 0.9 % (FLUSH) 0.9 %
10 SYRINGE (ML) INJECTION AS NEEDED
Status: DISCONTINUED | OUTPATIENT
Start: 2019-12-20 | End: 2019-12-21 | Stop reason: HOSPADM

## 2019-12-20 RX ORDER — ONDANSETRON 4 MG/1
4 TABLET, ORALLY DISINTEGRATING ORAL EVERY 6 HOURS PRN
Qty: 12 TABLET | Refills: 0 | Status: SHIPPED | OUTPATIENT
Start: 2019-12-20 | End: 2019-12-23

## 2019-12-20 RX ORDER — PROMETHAZINE HYDROCHLORIDE 25 MG/1
25 TABLET ORAL EVERY 6 HOURS PRN
Qty: 9 TABLET | Refills: 0 | Status: SHIPPED | OUTPATIENT
Start: 2019-12-20 | End: 2019-12-23

## 2019-12-20 RX ADMIN — HYDROMORPHONE HYDROCHLORIDE 1 MG: 1 INJECTION, SOLUTION INTRAMUSCULAR; INTRAVENOUS; SUBCUTANEOUS at 18:51

## 2019-12-20 RX ADMIN — ONDANSETRON HYDROCHLORIDE 4 MG: 2 SOLUTION INTRAMUSCULAR; INTRAVENOUS at 18:28

## 2019-12-20 RX ADMIN — SODIUM CHLORIDE, PRESERVATIVE FREE 10 ML: 5 INJECTION INTRAVENOUS at 21:25

## 2019-12-20 RX ADMIN — IOPAMIDOL 100 ML: 612 INJECTION, SOLUTION INTRAVENOUS at 19:38

## 2019-12-20 RX ADMIN — HYDROMORPHONE HYDROCHLORIDE 1 MG: 1 INJECTION, SOLUTION INTRAMUSCULAR; INTRAVENOUS; SUBCUTANEOUS at 21:25

## 2019-12-20 RX ADMIN — SODIUM CHLORIDE 1000 ML: 9 INJECTION, SOLUTION INTRAVENOUS at 18:51

## 2019-12-20 RX ADMIN — ONDANSETRON HYDROCHLORIDE 4 MG: 2 SOLUTION INTRAMUSCULAR; INTRAVENOUS at 21:23

## 2019-12-21 NOTE — DISCHARGE INSTRUCTIONS
Follow up with one of the [facility] physician groups below to setup primary care. If you have trouble making an appointment, please call the [facility] Nurse Line at [phone]    Dr. Esmer Taylor DO, Dr. Miguel Moseley DO, and SANA Leroy  [facility]  [address] [place] [address], [id number] [phone]    Dr. Fabricio Gary MD  [facility] Internal Medicine - [address] [address], [address], [place], [place] [id number] [phone]    Dr. Masoud Quarles DO, Dr. Austin Hayes DO,  SANA Hadley, and SANA Clark  [facility] - [address] [address], [address], [place], [place] [address] [phone]     Dr. Kristi Yusuf MD, and SANA Forbes  [facility] [address] [id number] [phone]    Dr. Ramon Briones MD and Dr. Neftaly Ramírez MD  [address], [address] [phone]    Dr. Hieu Corbin MD  [facility]  [address] [place] [address], [address], [place], [place], [address] [phone]    Dr. Raymond Luna MD  [facility]  [address] [place], [place], [address] [phone]    Increase fluids - clear liquids and advance diet as tolerated.  Medication as ordered. Tylenol or motrin as needed for pain/fever. Follow up with PCP Monday - call for appointment. Return to ED if condition does not improve or worsens

## 2019-12-21 NOTE — ED PROVIDER NOTES
Subjective   24 yom c/o n/v/d and abdomen pain. Onset at 12 pm today.  He states he believes he had a fever 'around 100' earlier today.  Abdomen pain is located in the umbilical area.  He is also tender in that area.          Review of Systems   Constitutional: Negative for activity change, appetite change, fatigue and fever.   HENT: Negative for congestion, ear pain, facial swelling and sore throat.    Eyes: Negative for discharge and visual disturbance.   Respiratory: Negative for apnea, chest tightness, shortness of breath, wheezing and stridor.    Cardiovascular: Negative for chest pain and palpitations.   Gastrointestinal: Positive for abdominal pain, diarrhea and nausea. Negative for abdominal distention and vomiting.   Genitourinary: Negative for difficulty urinating and dysuria.   Musculoskeletal: Negative for arthralgias and myalgias.   Skin: Negative for rash and wound.   Neurological: Negative for dizziness and seizures.   Psychiatric/Behavioral: Negative for agitation and confusion.       Past Medical History:   Diagnosis Date   • Chronic back pain    • Patient denies medical problems        No Known Allergies    Past Surgical History:   Procedure Laterality Date   • NO PAST SURGERIES     • TONSILLECTOMY     • WISDOM TOOTH EXTRACTION         History reviewed. No pertinent family history.    Social History     Socioeconomic History   • Marital status: Legally      Spouse name: Not on file   • Number of children: Not on file   • Years of education: Not on file   • Highest education level: Not on file   Tobacco Use   • Smoking status: Current Every Day Smoker     Packs/day: 1.00   Substance and Sexual Activity   • Alcohol use: Yes     Comment: occ   • Drug use: No   • Sexual activity: Defer           Objective   Physical Exam   Constitutional: He is oriented to person, place, and time. He appears well-developed.   HENT:   Head: Normocephalic.   Eyes: Pupils are equal, round, and reactive to light.  "EOM are normal.   Neck: Normal range of motion. Neck supple.   Cardiovascular: Normal rate and regular rhythm.   No murmur heard.  Pulmonary/Chest: Effort normal and breath sounds normal.   Abdominal: Soft. Bowel sounds are normal.       Musculoskeletal: Normal range of motion.   Neurological: He is alert and oriented to person, place, and time.   Skin: Skin is warm and dry.   Psychiatric: He has a normal mood and affect.   Nursing note and vitals reviewed.      Procedures          No current facility-administered medications for this encounter.     Current Outpatient Medications:   •  ondansetron ODT (ZOFRAN-ODT) 4 MG disintegrating tablet, Take 1 tablet by mouth Every 6 (Six) Hours As Needed for Nausea or Vomiting for up to 3 days., Disp: 12 tablet, Rfl: 0  •  promethazine (PHENERGAN) 25 MG tablet, Take 1 tablet by mouth Every 6 (Six) Hours As Needed for Nausea or Vomiting for up to 3 days., Disp: 9 tablet, Rfl: 0    Vital signs:  /76   Pulse 64   Temp 99.1 °F (37.3 °C) (Oral)   Resp 16   Ht 172.7 cm (68\")   Wt 70.3 kg (155 lb)   SpO2 100%   BMI 23.57 kg/m²        ED LAB RESULTS:   Lab Results (last 24 hours)     Procedure Component Value Units Date/Time    Comprehensive Metabolic Panel [[id number]]  (Abnormal) Collected:  12/20/19 1748    Specimen:  Blood Updated:  12/20/19 1907     Glucose 136 mg/dL      BUN 9 mg/dL      Creatinine 0.66 mg/dL      Sodium 142 mmol/L      Potassium 4.0 mmol/L      Chloride 105 mmol/L      CO2 19.0 mmol/L      Calcium 10.0 mg/dL      Total Protein 7.8 g/dL      Albumin 5.00 g/dL      ALT (SGPT) 11 U/L      AST (SGOT) 16 U/L      Alkaline Phosphatase 72 U/L      Total Bilirubin 0.7 mg/dL      eGFR Non African Amer 147 mL/min/1.73      Globulin 2.8 gm/dL      A/G Ratio 1.8 g/dL      BUN/Creatinine Ratio 13.6     Anion Gap 18.0 mmol/L     Narrative:       GFR Normal >60  Chronic Kidney Disease <60  Kidney Failure <15      Protime-INR [[id number]]  (Normal) Collected:  " 12/20/19 1748    Specimen:  Blood Updated:  12/20/19 1859     Protime 14.5 Seconds      INR 1.09    aPTT [[id number]]  (Normal) Collected:  12/20/19 1748    Specimen:  Blood Updated:  12/20/19 1859     PTT 24.4 seconds     Lipase [[id number]]  (Abnormal) Collected:  12/20/19 1748    Specimen:  Blood Updated:  12/20/19 1907     Lipase 7 U/L     Amylase [[id number]]  (Normal) Collected:  12/20/19 1748    Specimen:  Blood Updated:  12/20/19 1907     Amylase 30 U/L     CBC & Differential [[id number]] Collected:  12/20/19 1749    Specimen:  Blood Updated:  12/20/19 1852    Narrative:       The following orders were created for panel order CBC & Differential.  Procedure                               Abnormality         Status                     ---------                               -----------         ------                     CBC Auto Differential[[id number]]        Abnormal            Final result                 Please view results for these tests on the individual orders.    CBC Auto Differential [[id number]]  (Abnormal) Collected:  12/20/19 1749    Specimen:  Blood Updated:  12/20/19 1852     WBC 17.91 10*3/mm3      RBC 4.81 10*6/mm3      Hemoglobin 14.7 g/dL      Hematocrit 41.7 %      MCV 86.7 fL      MCH 30.6 pg      MCHC 35.3 g/dL      RDW 13.2 %      RDW-SD 41.9 fl      MPV 9.6 fL      Platelets 359 10*3/mm3      Neutrophil % 91.7 %      Lymphocyte % 4.9 %      Monocyte % 2.7 %      Eosinophil % 0.0 %      Basophil % 0.2 %      Immature Grans % 0.5 %      Neutrophils, Absolute 16.43 10*3/mm3      Lymphocytes, Absolute 0.88 10*3/mm3      Monocytes, Absolute 0.48 10*3/mm3      Eosinophils, Absolute 0.00 10*3/mm3      Basophils, Absolute 0.03 10*3/mm3      Immature Grans, Absolute 0.09 10*3/mm3      nRBC 0.0 /100 WBC     Urinalysis With Culture If Indicated - Urine, Clean Catch [[id number]]  (Abnormal) Collected:  12/20/19 2045    Specimen:  Urine, Clean Catch Updated:  12/20/19 2100     Color, UA Yellow      Appearance, UA Clear     pH, UA 7.0     Specific Gravity, UA >1.030     Glucose, UA Negative     Ketones, UA 40 mg/dL (2+)     Bilirubin, UA Negative     Blood, UA Trace     Protein, UA Negative     Leuk Esterase, UA Negative     Nitrite, UA Negative     Urobilinogen, UA 0.2 E.U./dL    Urinalysis, Microscopic Only - Urine, Clean Catch [[id number]]  (Abnormal) Collected:  12/20/19 2045    Specimen:  Urine, Clean Catch Updated:  12/20/19 2100     RBC, UA 3-5 /HPF      WBC, UA 3-5 /HPF      Bacteria, UA None Seen /HPF      Squamous Epithelial Cells, UA 0-2 /HPF      Hyaline Casts, UA None Seen /LPF      Methodology Automated Microscopy    Influenza Antigen, Rapid - Swab, Nasopharynx [[id number]]  (Normal) Collected:  12/20/19 2123    Specimen:  Swab from Nasopharynx Updated:  12/20/19 2146     Influenza A Ag, EIA Negative     Influenza B Ag, EIA Negative    Narrative:       Recommend confirmation of negative results by viral culture or molecular assay.             IMAGING RESULTS  CT Abdomen Pelvis With Contrast   Final Result   1. No evidence of acute abdominopelvic process.           This report was finalized on 12/20/2019 19:56 by Dr. Noel Cary MD.                       ED Course  ED Course as of Dec 21 0953   Fri Dec 20, 2019   2150 I reviewed the patient's testing results with him.  He voiced understanding of d'c instructions as well as results.    [KS]      ED Course User Index  [KS] Bennie Ricketts, APRN                      No data recorded                        MDM  Number of Diagnoses or Management Options  Gastroenteritis: minor     Amount and/or Complexity of Data Reviewed  Clinical lab tests: ordered and reviewed  Tests in the radiology section of CPT®: ordered and reviewed    Risk of Complications, Morbidity, and/or Mortality  Presenting problems: minimal  Diagnostic procedures: minimal  Management options: minimal    Patient Progress  Patient progress: improved      Final diagnoses:    Gastroenteritis              Shoulders, Bennie Lyn, APRN  12/21/19 0913

## 2019-12-22 ENCOUNTER — APPOINTMENT (OUTPATIENT)
Dept: GENERAL RADIOLOGY | Facility: HOSPITAL | Age: 24
End: 2019-12-22

## 2019-12-22 ENCOUNTER — APPOINTMENT (OUTPATIENT)
Dept: ULTRASOUND IMAGING | Facility: HOSPITAL | Age: 24
End: 2019-12-22

## 2019-12-22 ENCOUNTER — HOSPITAL ENCOUNTER (EMERGENCY)
Facility: HOSPITAL | Age: 24
Discharge: HOME | End: 2019-12-22
Admitting: FAMILY MEDICINE

## 2019-12-22 VITALS
WEIGHT: 160 LBS | DIASTOLIC BLOOD PRESSURE: 83 MMHG | HEIGHT: 69 IN | HEART RATE: 51 BPM | OXYGEN SATURATION: 100 % | BODY MASS INDEX: 23.7 KG/M2 | SYSTOLIC BLOOD PRESSURE: 156 MMHG | TEMPERATURE: 97.8 F | RESPIRATION RATE: 14 BRPM

## 2019-12-22 DIAGNOSIS — K52.9 GASTROENTERITIS: Primary | ICD-10-CM

## 2019-12-22 LAB
ALBUMIN SERPL-MCNC: 4.6 G/DL (ref 3.5–5.2)
ALBUMIN/GLOB SERPL: 1.8 G/DL
ALP SERPL-CCNC: 61 U/L (ref 39–117)
ALT SERPL W P-5'-P-CCNC: 10 U/L (ref 1–41)
AMPHET+METHAMPHET UR QL: NEGATIVE
AMPHETAMINES UR QL: NEGATIVE
ANION GAP SERPL CALCULATED.3IONS-SCNC: 13 MMOL/L (ref 5–15)
AST SERPL-CCNC: 12 U/L (ref 1–40)
BACTERIA UR QL AUTO: ABNORMAL /HPF
BARBITURATES UR QL SCN: NEGATIVE
BASOPHILS # BLD AUTO: 0.04 10*3/MM3 (ref 0–0.2)
BASOPHILS NFR BLD AUTO: 0.3 % (ref 0–1.5)
BENZODIAZ UR QL SCN: NEGATIVE
BILIRUB SERPL-MCNC: 0.7 MG/DL (ref 0.2–1.2)
BILIRUB UR QL STRIP: NEGATIVE
BUN BLD-MCNC: 10 MG/DL (ref 6–20)
BUN/CREAT SERPL: 13.9 (ref 7–25)
BUPRENORPHINE SERPL-MCNC: NEGATIVE NG/ML
CALCIUM SPEC-SCNC: 9.1 MG/DL (ref 8.6–10.5)
CANNABINOIDS SERPL QL: NEGATIVE
CHLORIDE SERPL-SCNC: 105 MMOL/L (ref 98–107)
CLARITY UR: CLEAR
CO2 SERPL-SCNC: 24 MMOL/L (ref 22–29)
COCAINE UR QL: NEGATIVE
COLOR UR: YELLOW
CREAT BLD-MCNC: 0.72 MG/DL (ref 0.76–1.27)
D-LACTATE SERPL-SCNC: 0.9 MMOL/L (ref 0.5–2)
DEPRECATED RDW RBC AUTO: 43.7 FL (ref 37–54)
EOSINOPHIL # BLD AUTO: 0.06 10*3/MM3 (ref 0–0.4)
EOSINOPHIL NFR BLD AUTO: 0.5 % (ref 0.3–6.2)
ERYTHROCYTE [DISTWIDTH] IN BLOOD BY AUTOMATED COUNT: 13.3 % (ref 12.3–15.4)
GFR SERPL CREATININE-BSD FRML MDRD: 133 ML/MIN/1.73
GLOBULIN UR ELPH-MCNC: 2.6 GM/DL
GLUCOSE BLD-MCNC: 113 MG/DL (ref 65–99)
GLUCOSE UR STRIP-MCNC: NEGATIVE MG/DL
HCT VFR BLD AUTO: 41.4 % (ref 37.5–51)
HGB BLD-MCNC: 14.3 G/DL (ref 13–17.7)
HGB UR QL STRIP.AUTO: NEGATIVE
HYALINE CASTS UR QL AUTO: ABNORMAL /LPF
IMM GRANULOCYTES # BLD AUTO: 0.04 10*3/MM3 (ref 0–0.05)
IMM GRANULOCYTES NFR BLD AUTO: 0.3 % (ref 0–0.5)
KETONES UR QL STRIP: ABNORMAL
LEUKOCYTE ESTERASE UR QL STRIP.AUTO: NEGATIVE
LIPASE SERPL-CCNC: 30 U/L (ref 13–60)
LYMPHOCYTES # BLD AUTO: 1.05 10*3/MM3 (ref 0.7–3.1)
LYMPHOCYTES NFR BLD AUTO: 8.2 % (ref 19.6–45.3)
MCH RBC QN AUTO: 30.9 PG (ref 26.6–33)
MCHC RBC AUTO-ENTMCNC: 34.5 G/DL (ref 31.5–35.7)
MCV RBC AUTO: 89.4 FL (ref 79–97)
METHADONE UR QL SCN: NEGATIVE
MONOCYTES # BLD AUTO: 0.68 10*3/MM3 (ref 0.1–0.9)
MONOCYTES NFR BLD AUTO: 5.3 % (ref 5–12)
NEUTROPHILS # BLD AUTO: 10.95 10*3/MM3 (ref 1.7–7)
NEUTROPHILS NFR BLD AUTO: 85.4 % (ref 42.7–76)
NITRITE UR QL STRIP: NEGATIVE
NRBC BLD AUTO-RTO: 0 /100 WBC (ref 0–0.2)
OPIATES UR QL: NEGATIVE
OXYCODONE UR QL SCN: NEGATIVE
PCP UR QL SCN: NEGATIVE
PH UR STRIP.AUTO: 6.5 [PH] (ref 5–8)
PLATELET # BLD AUTO: 285 10*3/MM3 (ref 140–450)
PMV BLD AUTO: 9.6 FL (ref 6–12)
POTASSIUM BLD-SCNC: 3.4 MMOL/L (ref 3.5–5.2)
PROPOXYPH UR QL: NEGATIVE
PROT SERPL-MCNC: 7.2 G/DL (ref 6–8.5)
PROT UR QL STRIP: ABNORMAL
RBC # BLD AUTO: 4.63 10*6/MM3 (ref 4.14–5.8)
RBC # UR: ABNORMAL /HPF
REF LAB TEST METHOD: ABNORMAL
SODIUM BLD-SCNC: 142 MMOL/L (ref 136–145)
SP GR UR STRIP: 1.02 (ref 1–1.03)
SQUAMOUS #/AREA URNS HPF: ABNORMAL /HPF
TRICYCLICS UR QL SCN: NEGATIVE
UROBILINOGEN UR QL STRIP: ABNORMAL
WBC NRBC COR # BLD: 12.82 10*3/MM3 (ref 3.4–10.8)
WBC UR QL AUTO: ABNORMAL /HPF

## 2019-12-22 PROCEDURE — 96374 THER/PROPH/DIAG INJ IV PUSH: CPT

## 2019-12-22 PROCEDURE — 81001 URINALYSIS AUTO W/SCOPE: CPT | Performed by: NURSE PRACTITIONER

## 2019-12-22 PROCEDURE — 93010 ELECTROCARDIOGRAM REPORT: CPT | Performed by: INTERNAL MEDICINE

## 2019-12-22 PROCEDURE — 99284 EMERGENCY DEPT VISIT MOD MDM: CPT

## 2019-12-22 PROCEDURE — 80053 COMPREHEN METABOLIC PANEL: CPT | Performed by: NURSE PRACTITIONER

## 2019-12-22 PROCEDURE — 25010000002 ONDANSETRON PER 1 MG: Performed by: NURSE PRACTITIONER

## 2019-12-22 PROCEDURE — 96375 TX/PRO/DX INJ NEW DRUG ADDON: CPT

## 2019-12-22 PROCEDURE — 25010000002 DIPHENHYDRAMINE PER 50 MG: Performed by: PHYSICIAN ASSISTANT

## 2019-12-22 PROCEDURE — 25010000002 PROMETHAZINE PER 50 MG: Performed by: NURSE PRACTITIONER

## 2019-12-22 PROCEDURE — 76705 ECHO EXAM OF ABDOMEN: CPT

## 2019-12-22 PROCEDURE — 25010000002 PROCHLORPERAZINE 10 MG/2ML SOLUTION: Performed by: PHYSICIAN ASSISTANT

## 2019-12-22 PROCEDURE — 83605 ASSAY OF LACTIC ACID: CPT | Performed by: NURSE PRACTITIONER

## 2019-12-22 PROCEDURE — 74019 RADEX ABDOMEN 2 VIEWS: CPT

## 2019-12-22 PROCEDURE — 85025 COMPLETE CBC W/AUTO DIFF WBC: CPT | Performed by: NURSE PRACTITIONER

## 2019-12-22 PROCEDURE — 83690 ASSAY OF LIPASE: CPT | Performed by: NURSE PRACTITIONER

## 2019-12-22 PROCEDURE — 93005 ELECTROCARDIOGRAM TRACING: CPT | Performed by: PHYSICIAN ASSISTANT

## 2019-12-22 PROCEDURE — 80307 DRUG TEST PRSMV CHEM ANLYZR: CPT | Performed by: NURSE PRACTITIONER

## 2019-12-22 RX ORDER — DICYCLOMINE HYDROCHLORIDE 10 MG/1
10 CAPSULE ORAL 3 TIMES DAILY PRN
Qty: 15 CAPSULE | Refills: 0 | OUTPATIENT
Start: 2019-12-22 | End: 2020-03-10

## 2019-12-22 RX ORDER — DIPHENHYDRAMINE HYDROCHLORIDE 50 MG/ML
12.5 INJECTION INTRAMUSCULAR; INTRAVENOUS ONCE
Status: COMPLETED | OUTPATIENT
Start: 2019-12-22 | End: 2019-12-22

## 2019-12-22 RX ORDER — PROCHLORPERAZINE MALEATE 10 MG
10 TABLET ORAL EVERY 6 HOURS PRN
Qty: 15 TABLET | Refills: 0 | OUTPATIENT
Start: 2019-12-22 | End: 2020-03-10

## 2019-12-22 RX ORDER — ONDANSETRON 2 MG/ML
4 INJECTION INTRAMUSCULAR; INTRAVENOUS ONCE
Status: COMPLETED | OUTPATIENT
Start: 2019-12-22 | End: 2019-12-22

## 2019-12-22 RX ORDER — PROCHLORPERAZINE EDISYLATE 5 MG/ML
10 INJECTION INTRAMUSCULAR; INTRAVENOUS ONCE
Status: COMPLETED | OUTPATIENT
Start: 2019-12-22 | End: 2019-12-22

## 2019-12-22 RX ORDER — ALUMINA, MAGNESIA, AND SIMETHICONE 2400; 2400; 240 MG/30ML; MG/30ML; MG/30ML
15 SUSPENSION ORAL ONCE
Status: COMPLETED | OUTPATIENT
Start: 2019-12-22 | End: 2019-12-22

## 2019-12-22 RX ORDER — FAMOTIDINE 10 MG/ML
20 INJECTION, SOLUTION INTRAVENOUS ONCE
Status: COMPLETED | OUTPATIENT
Start: 2019-12-22 | End: 2019-12-22

## 2019-12-22 RX ORDER — LIDOCAINE HYDROCHLORIDE 20 MG/ML
15 SOLUTION OROPHARYNGEAL ONCE
Status: COMPLETED | OUTPATIENT
Start: 2019-12-22 | End: 2019-12-22

## 2019-12-22 RX ORDER — PROMETHAZINE HYDROCHLORIDE 25 MG/ML
12.5 INJECTION, SOLUTION INTRAMUSCULAR; INTRAVENOUS ONCE
Status: COMPLETED | OUTPATIENT
Start: 2019-12-22 | End: 2019-12-22

## 2019-12-22 RX ADMIN — HYDROMORPHONE HYDROCHLORIDE 1 MG: 1 INJECTION, SOLUTION INTRAMUSCULAR; INTRAVENOUS; SUBCUTANEOUS at 10:53

## 2019-12-22 RX ADMIN — FAMOTIDINE 20 MG: 10 INJECTION, SOLUTION INTRAVENOUS at 10:42

## 2019-12-22 RX ADMIN — SODIUM CHLORIDE 1000 ML: 9 INJECTION, SOLUTION INTRAVENOUS at 10:42

## 2019-12-22 RX ADMIN — PROCHLORPERAZINE EDISYLATE 10 MG: 5 INJECTION INTRAMUSCULAR; INTRAVENOUS at 15:57

## 2019-12-22 RX ADMIN — DIPHENHYDRAMINE HYDROCHLORIDE 12.5 MG: 50 INJECTION INTRAMUSCULAR; INTRAVENOUS at 15:57

## 2019-12-22 RX ADMIN — ALUMINUM HYDROXIDE, MAGNESIUM HYDROXIDE, AND DIMETHICONE 15 ML: 400; 400; 40 SUSPENSION ORAL at 15:00

## 2019-12-22 RX ADMIN — PROMETHAZINE HYDROCHLORIDE 12.5 MG: 25 INJECTION INTRAMUSCULAR; INTRAVENOUS at 13:49

## 2019-12-22 RX ADMIN — LIDOCAINE HYDROCHLORIDE 15 ML: 20 SOLUTION ORAL; TOPICAL at 15:00

## 2019-12-22 RX ADMIN — ONDANSETRON HYDROCHLORIDE 4 MG: 2 SOLUTION INTRAMUSCULAR; INTRAVENOUS at 10:20

## 2019-12-22 NOTE — ED PROVIDER NOTES
Subjective   History of Present Illness    Review of Systems    Past Medical History:   Diagnosis Date   • Chronic back pain    • Patient denies medical problems        No Known Allergies    Past Surgical History:   Procedure Laterality Date   • NO PAST SURGERIES     • TONSILLECTOMY     • WISDOM TOOTH EXTRACTION         History reviewed. No pertinent family history.    Social History     Socioeconomic History   • Marital status: Legally      Spouse name: Not on file   • Number of children: Not on file   • Years of education: Not on file   • Highest education level: Not on file   Tobacco Use   • Smoking status: Current Every Day Smoker     Packs/day: 1.00   Substance and Sexual Activity   • Alcohol use: Yes     Comment: occ   • Drug use: No   • Sexual activity: Defer           Objective   Physical Exam    Procedures           ED Course  ED Course as of Dec 26 2241   Sun Dec 22, 2019   1340 On re-exam patient remains with nausea. He reports vomiting however it appears that he is mainly dry heaving. He reports soreness just above umbilicus. Two days ago ct scan was negative for appendicitis. On re-exam he also has epigastric pain. Labs have shown great improvements from nearly an 79.9 WBC to 12.82. Urinalysis does not reveal dehydration. CO2 also within normal limits. Will order gallbladder ultrasound and gi cocktail and re-evaluate.    [TW]   1445 Correction: 17.9 WBC a few days ago, today WBC 12.82.     [TW]   1455 On re-exam patient has not been administered gi cocktail. Will need to reassess.    [TW]      ED Course User Index  [TW] Vida Jacobs APRN                      No data recorded                        MDM  Number of Diagnoses or Management Options  Gastroenteritis: new and requires workup  Diagnosis management comments: Reassessed patient, still feels nauseated       Compazine ordered, patient improved     Dc in stable condition     See Vida Jacobs, SANA note for HPI PE ROS        Amount  and/or Complexity of Data Reviewed  Clinical lab tests: reviewed and ordered  Tests in the radiology section of CPT®: ordered and reviewed  Tests in the medicine section of CPT®: ordered and reviewed  Decide to obtain previous medical records or to obtain history from someone other than the patient: yes    Risk of Complications, Morbidity, and/or Mortality  Presenting problems: moderate  Diagnostic procedures: moderate  Management options: moderate        Final diagnoses:   Gastroenteritis              Destin Woo PA-C  12/26/19 0483

## 2019-12-22 NOTE — DISCHARGE INSTRUCTIONS
Follow up with one of the [facility] physician groups below to setup primary care. If you have trouble making an appointment, please call the [facility] Nurse Line at [phone]    Dr. Esmer Taylor DO, Dr. Miguel Moseley DO, and SANA Leroy  [facility] Primary Care  [address] [place] [address], [address] [phone]    Dr. Fabricio Gary MD  [facility] Internal Medicine - [address] [address], [address], [place], [place] [id number] [phone]    Dr. Masoud Quarles DO, Dr. Austin Hayes DO,  SANA Hadley, and SANA Clark  [facility] Family & Internal Medicine - [address] [address], [address], [place], [place] [id number] [phone]     Dr. Kristi Yusuf MD, and SANA Forbes  [facility] Family [address] [address], [place], [place] [address] [phone]    Dr. Ramon Briones MD and Dr. Neftaly Ramírez MD  [address], [address] [phone]    Dr. Hieu Corbin MD  [facility] Family Medicine [facility]  [address] [place] [address], [address], [place], [place], [address] [phone]    Dr. Raymond Luna MD  [facility] Family Medicine [facility]  [address], [place], [address] [phone]

## 2019-12-22 NOTE — ED PROVIDER NOTES
Subjective   Patient is a 24-year-old male presents emergency department with chief complaints of nausea and vomiting.  Patient was evaluated in this emergency department 2 days ago on December 20, 2019 with similar symptoms.  He began having nausea, vomiting, and diarrhea with abdominal pain at 12 PM on that date.  He also had subjective fever.  He reported abdominal discomfort in particular around his umbilical region.  Lab work on this date revealed elevated white blood count at 17.91.  Urinalysis revealed 40 ketones.  CO2 was low at 19.  Amylase and lipase are unremarkable.  Influenza swabs are negative.  CT scan was positive for gastroenteritis.  Patient states he remains with nausea and vomiting and unable to hold anything down.  He returns with continued symptoms.      Abdominal Pain   Pain location:  Periumbilical  Pain severity:  Moderate  Chronicity:  New  Worsened by:  Eating  Ineffective treatments: remains with vomiting; prescribed zofran a few days ago.  Associated symptoms: diarrhea, nausea and vomiting    Associated symptoms: no fever and no hematemesis        Review of Systems   Constitutional: Negative.  Negative for fever.   HENT: Negative.    Respiratory: Negative.    Cardiovascular: Negative.    Gastrointestinal: Positive for abdominal pain, diarrhea, nausea and vomiting. Negative for hematemesis.   Musculoskeletal: Negative.    Skin: Negative.    All other systems reviewed and are negative.      Past Medical History:   Diagnosis Date   • Chronic back pain    • Patient denies medical problems        No Known Allergies    Past Surgical History:   Procedure Laterality Date   • NO PAST SURGERIES     • TONSILLECTOMY     • WISDOM TOOTH EXTRACTION         History reviewed. No pertinent family history.    Social History     Socioeconomic History   • Marital status: Legally      Spouse name: Not on file   • Number of children: Not on file   • Years of education: Not on file   • Highest  education level: Not on file   Tobacco Use   • Smoking status: Current Every Day Smoker     Packs/day: 1.00   Substance and Sexual Activity   • Alcohol use: Yes     Comment: occ   • Drug use: No   • Sexual activity: Defer           Objective   Physical Exam   Constitutional: He is oriented to person, place, and time. He appears well-developed. No distress.   Appears ill   HENT:   Head: Normocephalic and atraumatic.   Right Ear: External ear normal.   Left Ear: External ear normal.   Nose: Nose normal.   Eyes: Pupils are equal, round, and reactive to light. Conjunctivae and EOM are normal. No scleral icterus.   Neck: Normal range of motion. Neck supple. No JVD present. No thyromegaly present.   Cardiovascular: Normal rate, regular rhythm, normal heart sounds and intact distal pulses.   No murmur heard.  Pulmonary/Chest: Effort normal and breath sounds normal. No respiratory distress. He has no wheezes. He has no rales. He exhibits no tenderness.   Abdominal: Soft. Bowel sounds are normal. He exhibits no distension and no mass. There is tenderness. There is no rebound and no guarding.   Musculoskeletal: Normal range of motion. He exhibits no edema.   Lymphadenopathy:     He has no cervical adenopathy.   Neurological: He is alert and oriented to person, place, and time. He has normal reflexes. No cranial nerve deficit. Coordination normal.   Skin: Skin is warm and dry. Capillary refill takes less than 2 seconds. No rash noted. He is not diaphoretic. No erythema. No pallor.   Psychiatric: He has a normal mood and affect. His behavior is normal. Judgment and thought content normal.   Nursing note and vitals reviewed.      Procedures           ED Course this will be a turn over for Destin RUIZ.  ED Course as of Dec 23 1007   Sun Dec 22, 2019   1340 On re-exam patient remains with nausea. He reports vomiting however it appears that he is mainly dry heaving. He reports soreness just above umbilicus. Two days ago ct scan  was negative for appendicitis. On re-exam he also has epigastric pain. Labs have shown great improvements from nearly an 79.9 WBC to 12.82. Urinalysis does not reveal dehydration. CO2 also within normal limits. Will order gallbladder ultrasound and gi cocktail and re-evaluate.    [TW]   1445 Correction: 17.9 WBC a few days ago, today WBC 12.82.     [TW]   1455 On re-exam patient has not been administered gi cocktail. Will need to reassess.    [TW]      ED Course User Index  [TW] Vida Jacobs, APRN                      No data recorded                        MDM  Number of Diagnoses or Management Options  Gastroenteritis: new and requires workup     Amount and/or Complexity of Data Reviewed  Clinical lab tests: ordered and reviewed  Tests in the radiology section of CPT®: ordered and reviewed  Discuss the patient with other providers: yes    Risk of Complications, Morbidity, and/or Mortality  Presenting problems: moderate  Diagnostic procedures: moderate  Management options: moderate    Patient Progress  Patient progress: improved      Final diagnoses:   Gastroenteritis              Vida Jacobs, APRN  12/23/19 1007

## 2019-12-26 ENCOUNTER — APPOINTMENT (OUTPATIENT)
Dept: GENERAL RADIOLOGY | Facility: HOSPITAL | Age: 24
End: 2019-12-26

## 2019-12-26 ENCOUNTER — HOSPITAL ENCOUNTER (EMERGENCY)
Facility: HOSPITAL | Age: 24
Discharge: HOME | End: 2019-12-26
Admitting: EMERGENCY MEDICINE

## 2019-12-26 ENCOUNTER — NURSE TRIAGE (OUTPATIENT)
Dept: CALL CENTER | Facility: HOSPITAL | Age: 24
End: 2019-12-26

## 2019-12-26 VITALS
SYSTOLIC BLOOD PRESSURE: 147 MMHG | BODY MASS INDEX: 21.76 KG/M2 | HEIGHT: 70 IN | OXYGEN SATURATION: 99 % | RESPIRATION RATE: 14 BRPM | HEART RATE: 60 BPM | TEMPERATURE: 98.7 F | DIASTOLIC BLOOD PRESSURE: 85 MMHG | WEIGHT: 152 LBS

## 2019-12-26 VITALS — BODY MASS INDEX: 24.37 KG/M2 | WEIGHT: 165 LBS

## 2019-12-26 DIAGNOSIS — K52.9 GASTROENTERITIS: Primary | ICD-10-CM

## 2019-12-26 LAB
ALBUMIN SERPL-MCNC: 4.5 G/DL (ref 3.5–5.2)
ALBUMIN/GLOB SERPL: 1.7 G/DL
ALP SERPL-CCNC: 56 U/L (ref 39–117)
ALT SERPL W P-5'-P-CCNC: 9 U/L (ref 1–41)
AMPHET+METHAMPHET UR QL: NEGATIVE
AMPHETAMINES UR QL: NEGATIVE
ANION GAP SERPL CALCULATED.3IONS-SCNC: 12 MMOL/L (ref 5–15)
AST SERPL-CCNC: 15 U/L (ref 1–40)
BARBITURATES UR QL SCN: NEGATIVE
BASOPHILS # BLD AUTO: 0.03 10*3/MM3 (ref 0–0.2)
BASOPHILS NFR BLD AUTO: 0.2 % (ref 0–1.5)
BENZODIAZ UR QL SCN: NEGATIVE
BILIRUB SERPL-MCNC: 0.5 MG/DL (ref 0.2–1.2)
BILIRUB UR QL STRIP: NEGATIVE
BUN BLD-MCNC: 6 MG/DL (ref 6–20)
BUN/CREAT SERPL: 8.7 (ref 7–25)
BUPRENORPHINE SERPL-MCNC: NEGATIVE NG/ML
CALCIUM SPEC-SCNC: 9 MG/DL (ref 8.6–10.5)
CANNABINOIDS SERPL QL: NEGATIVE
CHLORIDE SERPL-SCNC: 99 MMOL/L (ref 98–107)
CLARITY UR: ABNORMAL
CO2 SERPL-SCNC: 27 MMOL/L (ref 22–29)
COCAINE UR QL: NEGATIVE
COLOR UR: YELLOW
CREAT BLD-MCNC: 0.69 MG/DL (ref 0.76–1.27)
D-LACTATE SERPL-SCNC: 1.1 MMOL/L (ref 0.5–2)
DEPRECATED RDW RBC AUTO: 41.9 FL (ref 37–54)
EOSINOPHIL # BLD AUTO: 0.03 10*3/MM3 (ref 0–0.4)
EOSINOPHIL NFR BLD AUTO: 0.2 % (ref 0.3–6.2)
ERYTHROCYTE [DISTWIDTH] IN BLOOD BY AUTOMATED COUNT: 13.2 % (ref 12.3–15.4)
FLUAV AG NPH QL: NEGATIVE
FLUBV AG NPH QL IA: NEGATIVE
GFR SERPL CREATININE-BSD FRML MDRD: 140 ML/MIN/1.73
GLOBULIN UR ELPH-MCNC: 2.6 GM/DL
GLUCOSE BLD-MCNC: 112 MG/DL (ref 65–99)
GLUCOSE UR STRIP-MCNC: NEGATIVE MG/DL
HCT VFR BLD AUTO: 41 % (ref 37.5–51)
HGB BLD-MCNC: 14.5 G/DL (ref 13–17.7)
HGB UR QL STRIP.AUTO: NEGATIVE
IMM GRANULOCYTES # BLD AUTO: 0.04 10*3/MM3 (ref 0–0.05)
IMM GRANULOCYTES NFR BLD AUTO: 0.3 % (ref 0–0.5)
KETONES UR QL STRIP: NEGATIVE
LEUKOCYTE ESTERASE UR QL STRIP.AUTO: NEGATIVE
LIPASE SERPL-CCNC: 23 U/L (ref 13–60)
LYMPHOCYTES # BLD AUTO: 1.08 10*3/MM3 (ref 0.7–3.1)
LYMPHOCYTES NFR BLD AUTO: 8.3 % (ref 19.6–45.3)
MCH RBC QN AUTO: 31 PG (ref 26.6–33)
MCHC RBC AUTO-ENTMCNC: 35.4 G/DL (ref 31.5–35.7)
MCV RBC AUTO: 87.6 FL (ref 79–97)
METHADONE UR QL SCN: NEGATIVE
MONOCYTES # BLD AUTO: 0.53 10*3/MM3 (ref 0.1–0.9)
MONOCYTES NFR BLD AUTO: 4.1 % (ref 5–12)
NEUTROPHILS # BLD AUTO: 11.23 10*3/MM3 (ref 1.7–7)
NEUTROPHILS NFR BLD AUTO: 86.9 % (ref 42.7–76)
NITRITE UR QL STRIP: NEGATIVE
NRBC BLD AUTO-RTO: 0 /100 WBC (ref 0–0.2)
OPIATES UR QL: NEGATIVE
OXYCODONE UR QL SCN: NEGATIVE
PCP UR QL SCN: NEGATIVE
PH UR STRIP.AUTO: 8 [PH] (ref 5–8)
PLATELET # BLD AUTO: 297 10*3/MM3 (ref 140–450)
PMV BLD AUTO: 9.7 FL (ref 6–12)
POTASSIUM BLD-SCNC: 3.6 MMOL/L (ref 3.5–5.2)
PROPOXYPH UR QL: NEGATIVE
PROT SERPL-MCNC: 7.1 G/DL (ref 6–8.5)
PROT UR QL STRIP: NEGATIVE
RBC # BLD AUTO: 4.68 10*6/MM3 (ref 4.14–5.8)
SODIUM BLD-SCNC: 138 MMOL/L (ref 136–145)
SP GR UR STRIP: 1.01 (ref 1–1.03)
TRICYCLICS UR QL SCN: NEGATIVE
UROBILINOGEN UR QL STRIP: ABNORMAL
WBC NRBC COR # BLD: 12.94 10*3/MM3 (ref 3.4–10.8)

## 2019-12-26 PROCEDURE — 83605 ASSAY OF LACTIC ACID: CPT | Performed by: NURSE PRACTITIONER

## 2019-12-26 PROCEDURE — 85025 COMPLETE CBC W/AUTO DIFF WBC: CPT | Performed by: NURSE PRACTITIONER

## 2019-12-26 PROCEDURE — 25010000002 MORPHINE PER 10 MG: Performed by: NURSE PRACTITIONER

## 2019-12-26 PROCEDURE — 96375 TX/PRO/DX INJ NEW DRUG ADDON: CPT

## 2019-12-26 PROCEDURE — 96374 THER/PROPH/DIAG INJ IV PUSH: CPT

## 2019-12-26 PROCEDURE — 81003 URINALYSIS AUTO W/O SCOPE: CPT | Performed by: NURSE PRACTITIONER

## 2019-12-26 PROCEDURE — 99284 EMERGENCY DEPT VISIT MOD MDM: CPT

## 2019-12-26 PROCEDURE — 25010000002 ONDANSETRON PER 1 MG: Performed by: NURSE PRACTITIONER

## 2019-12-26 PROCEDURE — 80053 COMPREHEN METABOLIC PANEL: CPT | Performed by: NURSE PRACTITIONER

## 2019-12-26 PROCEDURE — 74019 RADEX ABDOMEN 2 VIEWS: CPT

## 2019-12-26 PROCEDURE — 80307 DRUG TEST PRSMV CHEM ANLYZR: CPT | Performed by: NURSE PRACTITIONER

## 2019-12-26 PROCEDURE — 25010000002 PROMETHAZINE PER 50 MG: Performed by: NURSE PRACTITIONER

## 2019-12-26 PROCEDURE — 87804 INFLUENZA ASSAY W/OPTIC: CPT | Performed by: NURSE PRACTITIONER

## 2019-12-26 PROCEDURE — 83690 ASSAY OF LIPASE: CPT | Performed by: NURSE PRACTITIONER

## 2019-12-26 RX ORDER — ONDANSETRON 4 MG/1
4 TABLET, ORALLY DISINTEGRATING ORAL EVERY 6 HOURS PRN
Qty: 8 TABLET | Refills: 0 | Status: SHIPPED | OUTPATIENT
Start: 2019-12-26 | End: 2019-12-28

## 2019-12-26 RX ORDER — OMEPRAZOLE 20 MG/1
20 CAPSULE, DELAYED RELEASE ORAL DAILY
Qty: 7 CAPSULE | Refills: 0 | Status: SHIPPED | OUTPATIENT
Start: 2019-12-26 | End: 2020-01-02

## 2019-12-26 RX ORDER — PROMETHAZINE HYDROCHLORIDE 25 MG/ML
12.5 INJECTION, SOLUTION INTRAMUSCULAR; INTRAVENOUS ONCE
Status: COMPLETED | OUTPATIENT
Start: 2019-12-26 | End: 2019-12-26

## 2019-12-26 RX ORDER — SODIUM CHLORIDE 0.9 % (FLUSH) 0.9 %
10 SYRINGE (ML) INJECTION AS NEEDED
Status: DISCONTINUED | OUTPATIENT
Start: 2019-12-26 | End: 2019-12-26 | Stop reason: HOSPADM

## 2019-12-26 RX ORDER — ONDANSETRON 2 MG/ML
4 INJECTION INTRAMUSCULAR; INTRAVENOUS ONCE
Status: COMPLETED | OUTPATIENT
Start: 2019-12-26 | End: 2019-12-26

## 2019-12-26 RX ADMIN — SODIUM CHLORIDE 1000 ML: 9 INJECTION, SOLUTION INTRAVENOUS at 10:06

## 2019-12-26 RX ADMIN — ONDANSETRON HYDROCHLORIDE 4 MG: 2 SOLUTION INTRAMUSCULAR; INTRAVENOUS at 10:06

## 2019-12-26 RX ADMIN — SODIUM CHLORIDE 1000 ML: 9 INJECTION, SOLUTION INTRAVENOUS at 11:25

## 2019-12-26 RX ADMIN — MORPHINE SULFATE 4 MG: 4 INJECTION, SOLUTION INTRAMUSCULAR; INTRAVENOUS at 10:11

## 2019-12-26 RX ADMIN — PROMETHAZINE HYDROCHLORIDE 12.5 MG: 25 INJECTION INTRAMUSCULAR; INTRAVENOUS at 11:49

## 2019-12-26 NOTE — TELEPHONE ENCOUNTER
"Correction to assessment section: Stated 3 but has vomited 15 times in past 24 hours.   Calling asking for advice. Still sick. No insurance, cannot afford to keep coming back to the ER patient states. Has been sick for one week with vomiting and since midnight has vomited 15 times and once with blood. Has veen vomiting blood one and off for the past week. States is red and also mixed in with coffee ground appearance. Pain is periumbical. Is also having diarrhea. Afebrile. He last urinated 8pm last night. Cannot keep fluids down. Referred to ED for signs of dehydration and blood in emesis with abdominal pain.     Reason for Disposition  • [1] Vomited blood AND [2] more than a few streaks of blood    (Exception: few streaks and only occurred once)    (Exception: swallowed blood from a nosebleed or cut in the mouth)    Additional Information  • Negative: Shock suspected (e.g., cold/pale/clammy skin, too weak to stand, low BP, rapid pulse)  • Negative: Difficult to awaken or acting confused (e.g., disoriented, slurred speech)  • Negative: Unable to walk, or can only walk with assistance (e.g., requires support)  • Negative: Fainted  • Negative: [1] Vomited blood AND [2] large amount (example: \"a cup of blood\")  • Negative: Rectal bleeding (i.e., bloody stool, blood in stool)  • Negative: Sounds like a life-threatening emergency to the triager  • Negative: Coughing up blood  (i.e., from lungs)  • Negative: Weak, dizzy or lightheaded    Answer Assessment - Initial Assessment Questions  1. APPEARANCE of BLOOD: \"What does the blood look like?\" (e.g., color, coffee-grounds)      Mixed bright red and dark brown  2. AMOUNT: \"How much blood was lost?\"      Small amount  3. VOMITING BLOOD: \"How many times did it happen?\" or \"How many times in the past 24 hours?\"      x1 with blood and has vomited 15 times today, has been sick for a week vomiting blood on and off  4. VOMITING WITHOUT BLOOD: \"How many times in the past 24 hours?\" " "      3  5. ONSET: \"When did vomiting of blood begin?\"      1 week  6. CAUSE: \"What do you think is causing the vomiting of blood?\"      unknown  7. BLOOD THINNERS: \"Do you take any blood thinners?\" (e.g., Coumadin/warfarin, Pradaxa/dabigatran, aspirin)      None  8. DEHYDRATION: \"Are there any signs of dehydration?\" \"When was the last time you urinated?\" \"Do you feel dizzy?\"      Last voided 8 pm last night, feels dehydrated  9. ABDOMINAL PAIN: \"Are you having any abdominal pain?\" If yes: \"What does it feel like? \" (e.g., crampy, dull, intermittent, constant)       Umbilical pain, constant  10. DIARRHEA: \"Is there any diarrhea?\" If so, ask: \"How many times today?\"         Yes  11. OTHER SYMPTOMS: \"Do you have any other symptoms?\" (e.g., fever, blood in stool)    No fever.         12. PREGNANCY: \"Is there any chance you are pregnant?\" \"When was your last menstrual period?\"        NA    Protocols used: VOMITING BLOOD-ADULT-AH      "

## 2020-02-21 ENCOUNTER — HOSPITAL ENCOUNTER (EMERGENCY)
Facility: HOSPITAL | Age: 25
Discharge: HOME | End: 2020-02-22
Admitting: INTERNAL MEDICINE

## 2020-02-21 ENCOUNTER — APPOINTMENT (OUTPATIENT)
Dept: GENERAL RADIOLOGY | Facility: HOSPITAL | Age: 25
End: 2020-02-21

## 2020-02-21 DIAGNOSIS — S93.401A SPRAIN OF RIGHT ANKLE, UNSPECIFIED LIGAMENT, INITIAL ENCOUNTER: Primary | ICD-10-CM

## 2020-02-21 PROCEDURE — 73610 X-RAY EXAM OF ANKLE: CPT

## 2020-02-21 PROCEDURE — 99283 EMERGENCY DEPT VISIT LOW MDM: CPT

## 2020-02-21 RX ORDER — OMEPRAZOLE 20 MG/1
20 CAPSULE, DELAYED RELEASE ORAL
COMMUNITY
Start: 2019-05-09 | End: 2021-02-26 | Stop reason: DRUGHIGH

## 2020-02-22 VITALS
BODY MASS INDEX: 21.47 KG/M2 | HEIGHT: 70 IN | SYSTOLIC BLOOD PRESSURE: 132 MMHG | RESPIRATION RATE: 18 BRPM | WEIGHT: 150 LBS | TEMPERATURE: 98.1 F | HEART RATE: 90 BPM | OXYGEN SATURATION: 99 % | DIASTOLIC BLOOD PRESSURE: 80 MMHG

## 2020-02-22 RX ORDER — KETOROLAC TROMETHAMINE 10 MG/1
10 TABLET, FILM COATED ORAL EVERY 6 HOURS PRN
Qty: 12 TABLET | Refills: 0 | OUTPATIENT
Start: 2020-02-22 | End: 2020-03-10

## 2020-02-22 RX ORDER — KETOROLAC TROMETHAMINE 10 MG/1
10 TABLET, FILM COATED ORAL EVERY 6 HOURS PRN
Status: DISCONTINUED | OUTPATIENT
Start: 2020-02-22 | End: 2020-02-22 | Stop reason: HOSPADM

## 2020-02-22 RX ADMIN — KETOROLAC TROMETHAMINE 10 MG: 10 TABLET, FILM COATED ORAL at 00:14

## 2020-02-22 NOTE — ED PROVIDER NOTES
Subjective   History of Present Illness    Patient is a 24-year-old male presenting to ED with right ankle injury.  Patient reported approximately 90 minutes prior to arrival he was walking down the hallway when he tripped with his right foot on a broom.  Patient reported he started to stumble with the left foot and then when he landed on the right foot again he inverted the ankle with all of his weight landing on the ankle before he caught himself on the wall.  Patient reported since that time he has had swelling and bruising to the lateral malleolus which extends along the lateral side on the midfoot.  Patient denies any sensation changes to the right foot but reported with any movement of wiggling his toes, foot movement, or ankle movement it significantly exacerbates the pain over the lateral malleolus.  Patient denies any proximal injuries, pain, or sensations.  Patient denies any other injuries, head injuries, loss of consciousness.  Patient denies any medication use prior to arrival and reported he was elevating the foot but use no ice.  Patient denies any previous history of right lower extremity injuries.    Review of Systems   Constitutional: Negative.    HENT: Negative.  Negative for dental problem, facial swelling, nosebleeds and tinnitus.    Eyes: Negative.    Respiratory: Negative.    Cardiovascular: Negative.    Gastrointestinal: Negative.    Genitourinary: Negative.    Musculoskeletal: Positive for arthralgias (right lateral ankle), gait problem (due to right ankle injury) and joint swelling (right lateral ankle). Negative for back pain and neck pain.   Skin: Negative.  Negative for rash and wound.   Neurological: Negative for weakness and numbness.        Denies head injury, denies LOC   Psychiatric/Behavioral: Negative.    All other systems reviewed and are negative.      Past Medical History:   Diagnosis Date   • Chronic back pain    • Patient denies medical problems        No Known  Allergies    Past Surgical History:   Procedure Laterality Date   • NO PAST SURGERIES     • TONSILLECTOMY     • WISDOM TOOTH EXTRACTION         History reviewed. No pertinent family history.    Social History     Socioeconomic History   • Marital status: Legally      Spouse name: Not on file   • Number of children: Not on file   • Years of education: Not on file   • Highest education level: Not on file   Tobacco Use   • Smoking status: Current Every Day Smoker     Packs/day: 1.00   Substance and Sexual Activity   • Alcohol use: Yes     Comment: occ   • Drug use: No   • Sexual activity: Defer           Objective   Physical Exam   Constitutional: He is oriented to person, place, and time. He appears well-developed and well-nourished. He is cooperative. No distress.   HENT:   Head: Normocephalic and atraumatic.   Right Ear: External ear normal.   Left Ear: External ear normal.   Mouth/Throat: Uvula is midline, oropharynx is clear and moist and mucous membranes are normal. No oropharyngeal exudate, posterior oropharyngeal edema or posterior oropharyngeal erythema.   Eyes: Pupils are equal, round, and reactive to light. Conjunctivae, EOM and lids are normal. Right conjunctiva is not injected. Left conjunctiva is not injected. Right eye exhibits no nystagmus. Left eye exhibits no nystagmus.   Neck: Normal range of motion. Neck supple.   Cardiovascular: Normal rate, regular rhythm, normal heart sounds, intact distal pulses and normal pulses.   No murmur heard.  Pulses:       Radial pulses are 2+ on the right side, and 2+ on the left side.        Dorsalis pedis pulses are 2+ on the right side, and 2+ on the left side.        Posterior tibial pulses are 2+ on the right side, and 2+ on the left side.   Pulmonary/Chest: Effort normal and breath sounds normal. No respiratory distress. He has no decreased breath sounds. He has no wheezes. He has no rhonchi. He has no rales. He exhibits no bony tenderness.   Abdominal:  Soft. Normal appearance and bowel sounds are normal. There is no tenderness. There is no guarding and no CVA tenderness.   Musculoskeletal: Normal range of motion. He exhibits no edema.        Right ankle: He exhibits swelling (Over lateral malleolus) and ecchymosis (Over the lateral malleolus). Tenderness. Lateral malleolus tenderness found. No medial malleolus tenderness found. Achilles tendon normal.        Cervical back: Normal.        Thoracic back: Normal.        Lumbar back: Normal. He exhibits no spasm.   Limited range of motion of right ankle secondary to pain but full passive range of motion.  Normal strength and sensation examination.        Neurological: He is alert and oriented to person, place, and time. He has normal strength. No sensory deficit. GCS eye subscore is 4. GCS verbal subscore is 5. GCS motor subscore is 6.   Skin: Skin is warm and dry. Capillary refill takes less than 2 seconds. No rash noted. He is not diaphoretic.   Psychiatric: He has a normal mood and affect. His speech is normal and behavior is normal. Thought content normal.   Nursing note and vitals reviewed.      Procedures           ED Course  ED Course as of Feb 22 0128   Sat Feb 22, 2020   0106 Reviewed with Dr. Esdras Qiu patient's mechanism of injury as well as x-ray imaging.  Dr. Qiu in agreement with treatment of sprain on an outpatient basis at this time.    [JS]   0108 Upon reevaluation patient resting comfortably in bed at this time.  Discussed with patient as well as significant other imaging results, need for Aircast and compressive Ace wrap, and importance of rest, ice, compression, elevation.  Discussed importance of establishing care with a primary care provider and provided patient a list of available providers in this area.  Discussed with patient need for follow-up in 48 hours, return precautions, and ability to return to ED at anytime as needed.  Discussed need for immediate return to ED should he  develop any new or worsening symptoms.  Patient without any further questions, concerns, or needs at this time and will be stable for discharge after application of ankle Aircast.    [JS]      ED Course User Index  [JS] Jeromy Razo PA-C                                 [facility] Dragon/transcription disclaimer: Much of this encounter note was created utilizing an electronic transcription/translation of spoken language to printed text.  Electronic translation of spoken language may permit erroneous, or at times, nonsensical words or phrases to be in advertently transcribed; although I have reviewed the note for such errors to the best of my ability, some may still exist.            MDM  Number of Diagnoses or Management Options  Sprain of right ankle, unspecified ligament, initial encounter:      Amount and/or Complexity of Data Reviewed  Tests in the radiology section of CPT®: ordered and reviewed  Decide to obtain previous medical records or to obtain history from someone other than the patient: yes  Review and summarize past medical records: yes  Discuss the patient with other providers: yes    Patient Progress  Patient progress: stable      Final diagnoses:   Sprain of right ankle, unspecified ligament, initial encounter            Jeromy Razo PA-C  02/22/20 0128

## 2020-02-22 NOTE — DISCHARGE INSTRUCTIONS
It is important that you establish care with a primary care provider.  Please see the list below for available providers in this area.  It is very important that you continue to rest, ice, elevate, compress, and use anti-inflammatory medications to help with the swelling associated with your ankle.  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  It is important that you have a primary care provider to follow-up with for repeat evaluation to ensure that the ankle is healing properly.  Please read below for further information regarding ankle sprains and appropriate treatment.    Follow up with one of the [facility] physician groups below to setup primary care. If you have trouble making an appointment, please call the [facility] Nurse Line at [phone]    Dr. Esmer Taylor DO, Dr. Miguel Moseley DO, and SANA Leroy  [facility]  [address] [place] [address], [address] [phone]    Dr. Fabricio Gary MD  [facility] Internal Medicine - [address] [address], [address], [place] [address] [phone]    Dr. Masoud Quarles DO, Dr. Austin Hayes DO,  SANA Hadley, and SAAN Clark  [facility] Internal Medicine [address] [address], [address], [place] [address] [phone]     Dr. Kristi Yusuf MD, and SANA Forbes  [facility] Medicine [address] [address], [place] [address] [phone]    Dr. Ramon Briones MD and Dr. Neftaly Ramírez MD  [address], [address] [phone]    Dr. Hieu Corbin MD  [facility]  [address] [place] [address], [address], [place], [place],  [address] [phone]    Dr. Raymond Luna MD  [address], [address] [phone]        Ankle Sprain    An ankle sprain is a stretch or tear in a ligament in the ankle. Ligaments are tissues that connect bones to each other.  The two most common types of ankle sprains are:  · Inversion sprain. This happens when the foot turns inward and the ankle rolls outward. It affects the ligament on the outside of the foot (lateral ligament).  · Eversion sprain. This happens when the foot turns outward and the ankle rolls inward. It affects the ligament on the inner side of the foot (medial ligament).  What are the causes?  This condition is often caused by accidentally rolling or twisting the ankle.  What increases the risk?  You are more likely to develop this condition if you play sports.  What are the signs or symptoms?  Symptoms of this condition include:  · Pain in your ankle.  · Swelling.  · Bruising. This may develop right after you sprain your ankle or 1-2 days later.  · Trouble standing or walking, especially when you turn or change directions.  How is this diagnosed?  This condition is diagnosed with:  · A physical exam. During the exam, your health care provider will press on certain parts of your foot and ankle and try to move them in certain ways.  · X-ray imaging. These may be taken to see how severe the sprain is and to check for broken bones.  How is this treated?  This condition may be treated with:  · A brace or splint. This is used to keep the ankle from moving until it heals.  · An elastic bandage. This is used to support the ankle.  · Crutches.  · Pain medicine.  · Surgery. This may be needed if the sprain is severe.  · Physical therapy. This may help to improve the range of motion in the ankle.  Follow these instructions at home:  If you have a brace or a splint:  · Wear the brace or splint as told by your health care  provider. Remove it only as told by your health care provider.  · Loosen the brace or splint if your toes tingle, become numb, or turn cold and blue.  · Keep the brace or splint clean.  · If the brace or splint is not waterproof:  ? Do not let it get wet.  ? Cover it with a watertight covering when you take a bath or a shower.  If you have an elastic bandage (dressing):  · Remove it to shower or bathe.  · Try not to move your ankle much, but wiggle your toes from time to time. This helps to prevent swelling.  · Adjust the dressing to make it more comfortable if it feels too tight.  · Loosen the dressing if you have numbness or tingling in your foot, or if your foot becomes cold and blue.  Managing pain, stiffness, and swelling    · Take over-the-counter and prescription medicines only as told by your health care provider.  · For 2-3 days, keep your ankle raised (elevated) above the level of your heart as much as possible.  · If directed, put ice on the injured area:  ? If you have a removable brace or splint, remove it as told by your health care provider.  ? Put ice in a plastic bag.  ? Place a towel between your skin and the bag.  ? Leave the ice on for 20 minutes, 2-3 times a day.  General instructions  · Rest your ankle.  · Do not use the injured limb to support your body weight until your health care provider says that you can. Use crutches as told by your health care provider.  · Do not use any products that contain nicotine or tobacco, such as cigarettes, e-cigarettes, and chewing tobacco. If you need help quitting, ask your health care provider.  · Keep all follow-up visits as told by your health care provider. This is important.  Contact a health care provider if:  · You have rapidly increasing bruising or swelling.  · Your pain is not relieved with medicine.  Get help right away if:  · Your foot or toes become numb or blue.  · You have severe pain that gets worse.  Summary  · An ankle sprain is a stretch  or tear in a ligament in the ankle. Ligaments are tissues that connect bones to each other.  · This condition is often caused by accidentally rolling or twisting the ankle.  · Symptoms include pain, swelling, bruising, and trouble walking.  · To relieve pain and swelling, put ice on the affected ankle, raise your ankle above the level of your heart, and use an elastic bandage.  · Keep all follow-up visits as told by your health care provider. This is important.  This information is not intended to replace advice given to you by your health care provider. Make sure you discuss any questions you have with your health care provider.  Document Released: 12/18/2006 Document Revised: 09/10/2019 Document Reviewed: 05/14/2019  [employer] Patient Education © 2020 [employer]      Elastic Bandage and RICE Therapy    Elastic bandages come in different shapes and sizes. They generally provide support to your injury and reduce swelling while you are healing, but they can perform different functions. Your health care provider will help you to decide what is best for your protection, recovery, or rehabilitation after an injury.  The routine care of many injuries includes rest, ice, compression, and elevation (RICE therapy). RICE therapy is often recommended for injuries to soft tissues, such as muscle strain, sprains, bruises, and overuse injuries. It can also be used for some bone injuries. Using RICE therapy can help to relieve pain and lessen swelling.  What are some general tips for using an elastic bandage?  · Use the bandage as directed by the maker of the bandage that you are using.  · Do not wrap the bandage too tightly. This may block (cut off) the circulation in the arm or leg in the area below the bandage.  ? If part of your body beyond the bandage becomes blue, numb, cold, swollen, or more painful, your bandage is probably too tight. If this occurs, remove your bandage and reapply it more loosely.  · Remove  and reapply an elastic bandage every 3-4 hours or as told by your health care provider.  · See your health care provider if the bandage seems to be making your problems worse rather than better.  How to care for your injury with RICE therapy  Rest  Rest your injury. This may help with the healing process. Rest usually involves limiting your normal activities and not using the injured part of your body. Generally, you can return to your normal activities when your health care provider says it is okay and you can do them without much discomfort.  If you rest the injury too much, it may not heal as well. Some injuries heal better with early movement instead of resting for too long. Talk with your health care provider about how you should limit your activities and whether you should start range-of-motion exercises for your injury.  Ice  Ice your injury to lessen swelling and pain. Do not apply ice directly to your skin.  · Put ice in a plastic bag.  · Place a towel between your skin and the bag.  · Leave the ice on for 20 minutes, 2-3 times a day. Use ice on as many days as told by your health care provider.    Compression  Put pressure (compression) on your injured area to control swelling, give support, and help with discomfort. Compression may be done with an elastic bandage.  Elevation  Raise (elevate) your injured area to lessen swelling and pain. If possible, elevate your injured area at or above the level of your heart or the center of your chest.  Contact a health care provider if:  · Your pain and swelling continue.  · Your symptoms are getting worse rather than improving.  Having these problems may mean that you need further evaluation or imaging tests, such as X-rays or an MRI. Sometimes, X-rays may not show a small broken bone (fracture) until days after the injury happened. Make a follow-up appointment with your health care provider. Ask your health care provider, or the department that is doing the imaging  test, when your results will be ready.  Get help right away if:  · You have sudden severe pain at or below the area of your injury.  · You have redness or increased swelling around your injury.  · You have tingling or numbness at or below the area of your injury and it does not improve after you remove the elastic bandage.  Summary  · Elastic bandages provide support to your injury and reduce swelling while you are healing. Your health care provider will help you decide which type of elastic bandage is best for your injury.  · Do not wrap the bandage too tightly. This may block (cut off) the circulation in the arm or leg in the area below the bandage.  · Putting pressure (compression) on your injured area with an elastic bandage is part of RICE therapy. RICE therapy includes rest, ice, compression, and elevation. This treatment is recommended for the routine care of many injuries.  This information is not intended to replace advice given to you by your health care provider. Make sure you discuss any questions you have with your health care provider.  Document Released: 06/09/2003 Document Revised: 09/07/2018 Document Reviewed: 09/07/2018  [employer] Interactive Patient Education © 2020 [employer]

## 2020-02-26 ENCOUNTER — NURSE TRIAGE (OUTPATIENT)
Dept: CALL CENTER | Facility: HOSPITAL | Age: 25
End: 2020-02-26

## 2020-02-27 NOTE — TELEPHONE ENCOUNTER
"    Reason for Disposition  • Sounds like a serious injury to the triager    Additional Information  • Negative: Serious injury with multiple fractures  • Negative: [1] Major bleeding (e.g., actively dripping or spurting) AND [2] can't be stopped  • Negative: Amputation  • Negative: Looks like a dislocated joint (very crooked or deformed)  • Negative: Sounds like a life-threatening emergency to the triager  • Negative: Wound looks infected  • Negative: Caused by an animal bite  • Negative: Caused by a human bite  • Negative: Puncture wound of foot  • Negative: Toe injury is main concern  • Negative: Cast problems or questions  • Negative: Bullet wound, stabbed by knife, or other serious penetrating wound  • Negative: Skin is split open or gaping (or length > 1/2 inch or 12 mm)  • Negative: [1] Bleeding AND [2] won't stop after 10 minutes of direct pressure (using correct technique)  • Negative: [1] Dirt in the wound AND [2] not removed with 15 minutes of scrubbing  • Negative: Can't stand (bear weight) or walk  • Negative: [1] Numbness (new loss of sensation) of toe(s) AND [2] present now  • Negative: [1] SEVERE pain AND [2] not improved 2 hours after pain medicine/ice packs  • Negative: Suspicious history for the injury    Answer Assessment - Initial Assessment Questions  1. MECHANISM: \"How did the injury happen?\" (e.g., twisting injury, direct blow)       fall  2. ONSET: \"When did the injury happen?\" (Minutes or hours ago)       Last Friday 2/21/2020  3. LOCATION: \"Where is the injury located?\"       Right ankle fracture with  muscle from bone  4. APPEARANCE of INJURY: \"What does the injury look like?\"       Ankle is bruised at fracture site, now the third and fourth toes are turning dark purple and the heel is almost black  5. WEIGHT-BEARING: \"Can you put weight on that foot?\" \"Can you walk (four steps or more)?\"        Denies; using crutches  6. SIZE: For cuts, bruises, or swelling, ask: \"How large is " "it?\" (e.g., inches or centimeters;  entire joint)       The bruising has stayed the same until tonight, the swelling is now described as ten times worse.  7. PAIN: \"Is there pain?\" If so, ask: \"How bad is the pain?\"    (e.g., Scale 1-10; or mild, moderate, severe)      severe  8. TETANUS: For any breaks in the skin, ask: \"When was the last tetanus booster?\"      na  9. OTHER SYMPTOMS: \"Do you have any other symptoms?\"       na  10. PREGNANCY: \"Is there any chance you are pregnant?\" \"When was your last menstrual period?\"        na    Protocols used: FOOT AND ANKLE INJURY-ADULT-      "

## 2020-03-10 ENCOUNTER — HOSPITAL ENCOUNTER (EMERGENCY)
Facility: HOSPITAL | Age: 25
Discharge: HOME | End: 2020-03-10
Admitting: EMERGENCY MEDICINE

## 2020-03-10 VITALS
RESPIRATION RATE: 14 BRPM | BODY MASS INDEX: 23.48 KG/M2 | HEIGHT: 70 IN | DIASTOLIC BLOOD PRESSURE: 70 MMHG | TEMPERATURE: 99.8 F | HEART RATE: 107 BPM | WEIGHT: 164 LBS | OXYGEN SATURATION: 99 % | SYSTOLIC BLOOD PRESSURE: 120 MMHG

## 2020-03-10 DIAGNOSIS — L03.211 FACIAL CELLULITIS: Primary | ICD-10-CM

## 2020-03-10 DIAGNOSIS — K02.9 PAIN DUE TO DENTAL CARIES: ICD-10-CM

## 2020-03-10 DIAGNOSIS — H66.003 NON-RECURRENT ACUTE SUPPURATIVE OTITIS MEDIA OF BOTH EARS WITHOUT SPONTANEOUS RUPTURE OF TYMPANIC MEMBRANES: ICD-10-CM

## 2020-03-10 PROCEDURE — 99283 EMERGENCY DEPT VISIT LOW MDM: CPT

## 2020-03-10 RX ORDER — AMOXICILLIN AND CLAVULANATE POTASSIUM 875; 125 MG/1; MG/1
1 TABLET, FILM COATED ORAL EVERY 12 HOURS
Qty: 20 TABLET | Refills: 0 | Status: SHIPPED | OUTPATIENT
Start: 2020-03-10 | End: 2020-03-20

## 2020-03-10 RX ORDER — KETOROLAC TROMETHAMINE 10 MG/1
10 TABLET, FILM COATED ORAL EVERY 6 HOURS PRN
Qty: 9 TABLET | Refills: 0 | Status: SHIPPED | OUTPATIENT
Start: 2020-03-10 | End: 2020-03-13

## 2020-03-10 RX ORDER — HYDROCODONE BITARTRATE AND ACETAMINOPHEN 10; 325 MG/1; MG/1
1 TABLET ORAL ONCE
Status: COMPLETED | OUTPATIENT
Start: 2020-03-10 | End: 2020-03-10

## 2020-03-10 RX ADMIN — HYDROCODONE BITARTRATE AND ACETAMINOPHEN 1 TABLET: 10; 325 TABLET ORAL at 10:43

## 2020-03-10 NOTE — ED PROVIDER NOTES
Subjective   24 yom presents c/o ear pain and right sided dental pain.  Onset a week ago.  He states he has broken all of the right lower teeth off and just obtained insurance. He is trying to see a dentist.  He states the R ear started to hurt and now both ears hurt.  No fever.            Review of Systems   Constitutional: Negative for activity change, appetite change, fatigue and fever.   HENT: Positive for dental problem and ear pain. Negative for congestion, facial swelling and sore throat.    Eyes: Negative for discharge and visual disturbance.   Respiratory: Negative for apnea, chest tightness, shortness of breath, wheezing and stridor.    Cardiovascular: Negative for chest pain and palpitations.   Gastrointestinal: Negative for abdominal distention, abdominal pain, diarrhea, nausea and vomiting.   Genitourinary: Negative for difficulty urinating and dysuria.   Musculoskeletal: Negative for arthralgias and myalgias.   Skin: Negative for rash and wound.   Neurological: Negative for dizziness and seizures.   Psychiatric/Behavioral: Negative for agitation and confusion.       Past Medical History:   Diagnosis Date   • Chronic back pain    • Patient denies medical problems        No Known Allergies    Past Surgical History:   Procedure Laterality Date   • NO PAST SURGERIES     • TONSILLECTOMY     • WISDOM TOOTH EXTRACTION         No family history on file.    Social History     Socioeconomic History   • Marital status: Legally      Spouse name: Not on file   • Number of children: Not on file   • Years of education: Not on file   • Highest education level: Not on file   Tobacco Use   • Smoking status: Current Every Day Smoker     Packs/day: 1.00   Substance and Sexual Activity   • Alcohol use: Yes     Comment: occ   • Drug use: No   • Sexual activity: Defer           Objective   Physical Exam   Constitutional: He is oriented to person, place, and time. He appears well-developed.   HENT:   Head:  Normocephalic.   Right Ear: There is tenderness. Tympanic membrane is erythematous.   Left Ear: There is tenderness. Tympanic membrane is erythematous.   Mouth/Throat: Abnormal dentition. Dental caries present. Posterior oropharyngeal erythema present.       Multiple right lower teeth broken and discolored   Eyes: Pupils are equal, round, and reactive to light. EOM are normal.   Neck: Normal range of motion. Neck supple.   Cardiovascular: Normal rate and regular rhythm.   No murmur heard.  Pulmonary/Chest: Effort normal and breath sounds normal.   Abdominal: Soft. Bowel sounds are normal.   Musculoskeletal: Normal range of motion.   Neurological: He is alert and oriented to person, place, and time.   Skin: Skin is warm and dry.   Psychiatric: He has a normal mood and affect.   Nursing note and vitals reviewed.      Procedures           ED Course                                           MDM  Number of Diagnoses or Management Options  Facial cellulitis: minor  Non-recurrent acute suppurative otitis media of both ears without spontaneous rupture of tympanic membranes: minor  Pain due to dental caries: minor  Risk of Complications, Morbidity, and/or Mortality  Presenting problems: minimal  Diagnostic procedures: minimal  Management options: minimal    Patient Progress  Patient progress: stable      Final diagnoses:   Facial cellulitis   Pain due to dental caries   Non-recurrent acute suppurative otitis media of both ears without spontaneous rupture of tympanic membranes            Bennie Ricketts, APRN  03/10/20 2042

## 2020-03-10 NOTE — DISCHARGE INSTRUCTIONS
Follow up with one of the [facility] physician groups below to setup primary care. If you have trouble making an appointment, please call the [facility] Nurse Line at [phone]    Dr. Esmer Taylor DO, Dr. Miguel Moseley DO, and SANA Leroy  [facility] Primary Care  [address] [place] [address], [id number] [phone]    Dr. Fabricio Gary MD  [facility] Internal Medicine - [address] [address], [address], [place], [place] [id number] [phone]    Dr. Masoud Quarles DO, Dr. Austin Hayes DO,  SANA Hadley, and SANA Clark  [facility] Family & Internal Medicine - [address] [address], [address], [place], [place] [id number] [phone]     Dr. Kristi Yusuf MD, and SANA Forbes  [facility] Family [address] [id number] [phone]    Dr. Ramon Briones MD and Dr. Neftaly Ramírez MD  [address], [address] [phone]    Dr. Hieu Corbin MD  [facility] Family [facility]  [address] [place] [address], [address], [place], [address] [phone]    Dr. Raymond Luna MD  [facility] Medicine [facility]  [address] [place], [place], [address] [phone]      Increase fluids.  Soft diet.  Medication as ordered.  Can add tylenol as needed for pain/fever. Warm salt water swish and spit.  No smoking.  Follow up with Pcp and dentist tomorrow - call for appointment. Return to ED if condition does not improve or worsens

## 2020-09-15 ENCOUNTER — APPOINTMENT (OUTPATIENT)
Dept: CT IMAGING | Age: 25
End: 2020-09-15
Payer: MEDICAID

## 2020-09-15 ENCOUNTER — HOSPITAL ENCOUNTER (EMERGENCY)
Age: 25
Discharge: HOME | End: 2020-09-15
Attending: EMERGENCY MEDICINE
Payer: MEDICAID

## 2020-09-15 ENCOUNTER — OFFICE VISIT (OUTPATIENT)
Dept: URGENT CARE | Age: 25
End: 2020-09-15

## 2020-09-15 VITALS
RESPIRATION RATE: 18 BRPM | HEART RATE: 52 BPM | OXYGEN SATURATION: 99 % | TEMPERATURE: 98.7 F | BODY MASS INDEX: 23.01 KG/M2 | DIASTOLIC BLOOD PRESSURE: 77 MMHG | WEIGHT: 165 LBS | SYSTOLIC BLOOD PRESSURE: 140 MMHG

## 2020-09-15 LAB
ALBUMIN SERPL-MCNC: 4.8 G/DL (ref 3.5–5.2)
ALP BLD-CCNC: 83 U/L (ref 40–130)
ALT SERPL-CCNC: 15 U/L (ref 5–41)
AMPHETAMINE SCREEN, URINE: NEGATIVE
AMYLASE: 50 U/L (ref 28–100)
ANION GAP SERPL CALCULATED.3IONS-SCNC: 12 MMOL/L (ref 7–19)
AST SERPL-CCNC: 19 U/L (ref 5–40)
BACTERIA: ABNORMAL /HPF
BARBITURATE SCREEN URINE: NEGATIVE
BASOPHILS ABSOLUTE: 0.1 K/UL (ref 0–0.2)
BASOPHILS RELATIVE PERCENT: 0.3 % (ref 0–1)
BENZODIAZEPINE SCREEN, URINE: NEGATIVE
BILIRUB SERPL-MCNC: 0.9 MG/DL (ref 0.2–1.2)
BILIRUBIN URINE: ABNORMAL
BLOOD, URINE: NEGATIVE
BUN BLDV-MCNC: 11 MG/DL (ref 6–20)
CALCIUM SERPL-MCNC: 9.7 MG/DL (ref 8.6–10)
CANNABINOID SCREEN URINE: NEGATIVE
CHLORIDE BLD-SCNC: 104 MMOL/L (ref 98–111)
CLARITY: ABNORMAL
CO2: 24 MMOL/L (ref 22–29)
COCAINE METABOLITE SCREEN URINE: NEGATIVE
COLOR: ABNORMAL
CREAT SERPL-MCNC: 0.9 MG/DL (ref 0.5–1.2)
EOSINOPHILS ABSOLUTE: 0 K/UL (ref 0–0.6)
EOSINOPHILS RELATIVE PERCENT: 0.2 % (ref 0–5)
EPITHELIAL CELLS, UA: 2 /HPF (ref 0–5)
GFR AFRICAN AMERICAN: >59
GFR NON-AFRICAN AMERICAN: >60
GLUCOSE BLD-MCNC: 124 MG/DL (ref 74–109)
GLUCOSE URINE: NEGATIVE MG/DL
HCT VFR BLD CALC: 45.2 % (ref 42–52)
HEMOGLOBIN: 15.6 G/DL (ref 14–18)
HYALINE CASTS: 8 /HPF (ref 0–8)
IMMATURE GRANULOCYTES #: 0.1 K/UL
KETONES, URINE: NEGATIVE MG/DL
LEUKOCYTE ESTERASE, URINE: ABNORMAL
LIPASE: 28 U/L (ref 13–60)
LYMPHOCYTES ABSOLUTE: 1.2 K/UL (ref 1.1–4.5)
LYMPHOCYTES RELATIVE PERCENT: 7.4 % (ref 20–40)
Lab: ABNORMAL
MCH RBC QN AUTO: 31.5 PG (ref 27–31)
MCHC RBC AUTO-ENTMCNC: 34.5 G/DL (ref 33–37)
MCV RBC AUTO: 91.1 FL (ref 80–94)
MONOCYTES ABSOLUTE: 0.7 K/UL (ref 0–0.9)
MONOCYTES RELATIVE PERCENT: 4 % (ref 0–10)
NEUTROPHILS ABSOLUTE: 14.5 K/UL (ref 1.5–7.5)
NEUTROPHILS RELATIVE PERCENT: 87.7 % (ref 50–65)
NITRITE, URINE: NEGATIVE
OPIATE SCREEN URINE: POSITIVE
PDW BLD-RTO: 13.4 % (ref 11.5–14.5)
PH UA: 6.5 (ref 5–8)
PLATELET # BLD: 294 K/UL (ref 130–400)
PMV BLD AUTO: 9.9 FL (ref 9.4–12.4)
POTASSIUM REFLEX MAGNESIUM: 3.7 MMOL/L (ref 3.5–5)
PROTEIN UA: ABNORMAL MG/DL
RBC # BLD: 4.96 M/UL (ref 4.7–6.1)
RBC UA: 3 /HPF (ref 0–4)
SODIUM BLD-SCNC: 140 MMOL/L (ref 136–145)
SPECIFIC GRAVITY UA: 1.02 (ref 1–1.03)
TOTAL PROTEIN: 7.5 G/DL (ref 6.6–8.7)
UROBILINOGEN, URINE: 1 E.U./DL
WBC # BLD: 16.5 K/UL (ref 4.8–10.8)
WBC UA: 4 /HPF (ref 0–5)

## 2020-09-15 PROCEDURE — 99999 PR OFFICE/OUTPT VISIT,PROCEDURE ONLY: CPT | Performed by: PHYSICIAN ASSISTANT

## 2020-09-15 PROCEDURE — 6360000004 HC RX CONTRAST MEDICATION: Performed by: EMERGENCY MEDICINE

## 2020-09-15 PROCEDURE — 80307 DRUG TEST PRSMV CHEM ANLYZR: CPT

## 2020-09-15 PROCEDURE — 74177 CT ABD & PELVIS W/CONTRAST: CPT

## 2020-09-15 PROCEDURE — 81001 URINALYSIS AUTO W/SCOPE: CPT

## 2020-09-15 PROCEDURE — 6360000002 HC RX W HCPCS: Performed by: PHYSICIAN ASSISTANT

## 2020-09-15 PROCEDURE — 99283 EMERGENCY DEPT VISIT LOW MDM: CPT

## 2020-09-15 PROCEDURE — 6370000000 HC RX 637 (ALT 250 FOR IP): Performed by: PHYSICIAN ASSISTANT

## 2020-09-15 PROCEDURE — 82150 ASSAY OF AMYLASE: CPT

## 2020-09-15 PROCEDURE — 36415 COLL VENOUS BLD VENIPUNCTURE: CPT

## 2020-09-15 PROCEDURE — 85025 COMPLETE CBC W/AUTO DIFF WBC: CPT

## 2020-09-15 PROCEDURE — 83690 ASSAY OF LIPASE: CPT

## 2020-09-15 PROCEDURE — 99999 PR OFFICE/OUTPT VISIT,PROCEDURE ONLY: CPT | Performed by: NURSE PRACTITIONER

## 2020-09-15 PROCEDURE — 2580000003 HC RX 258: Performed by: PHYSICIAN ASSISTANT

## 2020-09-15 PROCEDURE — 96374 THER/PROPH/DIAG INJ IV PUSH: CPT

## 2020-09-15 PROCEDURE — 80053 COMPREHEN METABOLIC PANEL: CPT

## 2020-09-15 PROCEDURE — 96375 TX/PRO/DX INJ NEW DRUG ADDON: CPT

## 2020-09-15 PROCEDURE — 99282 EMERGENCY DEPT VISIT SF MDM: CPT

## 2020-09-15 RX ORDER — PROMETHAZINE HYDROCHLORIDE 25 MG/ML
25 INJECTION, SOLUTION INTRAMUSCULAR; INTRAVENOUS ONCE
Status: COMPLETED | OUTPATIENT
Start: 2020-09-15 | End: 2020-09-15

## 2020-09-15 RX ORDER — PANTOPRAZOLE SODIUM 40 MG/1
40 TABLET, DELAYED RELEASE ORAL
Qty: 180 TABLET | Refills: 1 | Status: SHIPPED | OUTPATIENT
Start: 2020-09-15 | End: 2021-03-30 | Stop reason: SDUPTHER

## 2020-09-15 RX ORDER — 0.9 % SODIUM CHLORIDE 0.9 %
1000 INTRAVENOUS SOLUTION INTRAVENOUS ONCE
Status: COMPLETED | OUTPATIENT
Start: 2020-09-15 | End: 2020-09-15

## 2020-09-15 RX ORDER — MORPHINE SULFATE 4 MG/ML
2 INJECTION, SOLUTION INTRAMUSCULAR; INTRAVENOUS ONCE
Status: COMPLETED | OUTPATIENT
Start: 2020-09-15 | End: 2020-09-15

## 2020-09-15 RX ORDER — PROMETHAZINE HYDROCHLORIDE 25 MG/1
25 TABLET ORAL 4 TIMES DAILY PRN
Qty: 20 TABLET | Refills: 0 | Status: SHIPPED | OUTPATIENT
Start: 2020-09-15 | End: 2020-09-22

## 2020-09-15 RX ORDER — ONDANSETRON 2 MG/ML
4 INJECTION INTRAMUSCULAR; INTRAVENOUS ONCE
Status: COMPLETED | OUTPATIENT
Start: 2020-09-15 | End: 2020-09-15

## 2020-09-15 RX ADMIN — IOPAMIDOL 90 ML: 755 INJECTION, SOLUTION INTRAVENOUS at 18:57

## 2020-09-15 RX ADMIN — ONDANSETRON 4 MG: 2 INJECTION INTRAMUSCULAR; INTRAVENOUS at 16:22

## 2020-09-15 RX ADMIN — LIDOCAINE HYDROCHLORIDE: 20 SOLUTION ORAL; TOPICAL at 20:36

## 2020-09-15 RX ADMIN — SODIUM CHLORIDE 1000 ML: 9 INJECTION, SOLUTION INTRAVENOUS at 16:22

## 2020-09-15 RX ADMIN — MORPHINE SULFATE 2 MG: 4 INJECTION, SOLUTION INTRAMUSCULAR; INTRAVENOUS at 17:15

## 2020-09-15 RX ADMIN — PROMETHAZINE HYDROCHLORIDE 25 MG: 25 INJECTION INTRAMUSCULAR; INTRAVENOUS at 17:15

## 2020-09-15 ASSESSMENT — PAIN SCALES - GENERAL
PAINLEVEL_OUTOF10: 5
PAINLEVEL_OUTOF10: 7

## 2020-09-15 NOTE — ED PROVIDER NOTES
Negative for agitation. The patient is not nervous/anxious. Except as noted above the remainder of the review of systems was reviewed and negative. PAST MEDICAL HISTORY   History reviewed. No pertinent past medical history. SURGICAL HISTORY       Past Surgical History:   Procedure Laterality Date    TONSILLECTOMY           CURRENT MEDICATIONS       Discharge Medication List as of 9/15/2020  8:41 PM      CONTINUE these medications which have NOT CHANGED    Details   tiZANidine (ZANAFLEX) 4 MG tablet Take 1 tablet by mouth every 8 hours as needed (back pain), Disp-30 tablet, R-0Print      ibuprofen (ADVIL;MOTRIN) 800 MG tablet Take 1 tablet by mouth every 8 hours as needed for Pain, Disp-30 tablet,R-0Print      dicyclomine (BENTYL) 20 MG tablet Take 1 tablet by mouth 4 times daily, Disp-30 tablet, R-0Print      sucralfate (CARAFATE) 1 GM tablet Take 1 tablet by mouth 4 times daily, Disp-30 tablet, R-0Print             ALLERGIES     Patient has no known allergies. FAMILY HISTORY     History reviewed. No pertinent family history.        SOCIAL HISTORY       Social History     Socioeconomic History    Marital status:      Spouse name: None    Number of children: None    Years of education: None    Highest education level: None   Occupational History    None   Social Needs    Financial resource strain: None    Food insecurity     Worry: None     Inability: None    Transportation needs     Medical: None     Non-medical: None   Tobacco Use    Smoking status: Heavy Tobacco Smoker     Packs/day: 1.00     Types: Cigarettes    Smokeless tobacco: Never Used   Substance and Sexual Activity    Alcohol use: Not Currently     Comment: socially    Drug use: No    Sexual activity: None   Lifestyle    Physical activity     Days per week: None     Minutes per session: None    Stress: None   Relationships    Social connections     Talks on phone: None     Gets together: None     Attends Worship service: None     Active member of club or organization: None     Attends meetings of clubs or organizations: None     Relationship status: None    Intimate partner violence     Fear of current or ex partner: None     Emotionally abused: None     Physically abused: None     Forced sexual activity: None   Other Topics Concern    None   Social History Narrative    None       SCREENINGS           PHYSICAL EXAM    (up to 7 forlevel 4, 8 or more for level 5)     ED Triage Vitals [09/15/20 1515]   BP Temp Temp Source Pulse Resp SpO2 Height Weight   133/79 98.7 °F (37.1 °C) Temporal 50 20 99 % -- 165 lb (74.8 kg)       Physical Exam  Vitals signs and nursing note reviewed. Constitutional:       General: He is not in acute distress. Appearance: He is well-developed. He is not diaphoretic. HENT:      Head: Normocephalic and atraumatic. Right Ear: External ear normal.      Left Ear: External ear normal.      Mouth/Throat:      Mouth: Mucous membranes are moist.   Eyes:      Extraocular Movements: Extraocular movements intact. Pupils: Pupils are equal, round, and reactive to light. Neck:      Musculoskeletal: Normal range of motion and neck supple. Trachea: No tracheal deviation. Cardiovascular:      Rate and Rhythm: Normal rate and regular rhythm. Heart sounds: Normal heart sounds. No murmur. Pulmonary:      Effort: Pulmonary effort is normal.      Breath sounds: Normal breath sounds. No stridor. No wheezing. Chest:      Chest wall: No tenderness. Abdominal:      General: Bowel sounds are normal. There is no distension. Palpations: Abdomen is soft. Tenderness: There is no abdominal tenderness. Musculoskeletal: Normal range of motion. Skin:     General: Skin is warm and dry. Capillary Refill: Capillary refill takes less than 2 seconds. Neurological:      General: No focal deficit present. Mental Status: He is alert. He is disoriented.    Psychiatric: Mood and Affect: Mood normal.         Behavior: Behavior normal.           DIAGNOSTIC RESULTS     RADIOLOGY:   Non-plain film images such as CT, Ultrasound and MRI are read by the radiologist. Plain radiographic images are visualized and preliminarilyinterpreted by No att. providers found with the below findings:      Interpretation per the Radiologist below, if available at the time of this note:    CT ABDOMEN PELVIS W IV CONTRAST Additional Contrast? None   Final Result   1. Remote fractures of the left superior and inferior pubic rami   within the bony pelvis. 2. Otherwise unremarkable CT of the abdomen and pelvis. .    Signed by Dr Mnua Ayoub on 9/15/2020 8:26 PM          LABS:  Labs Reviewed   URINE RT REFLEX TO CULTURE - Abnormal; Notable for the following components:       Result Value    Clarity, UA CLOUDY (*)     Bilirubin Urine SMALL (*)     Protein, UA TRACE (*)     Leukocyte Esterase, Urine TRACE (*)     All other components within normal limits   URINE DRUG SCREEN - Abnormal; Notable for the following components:    Opiate Scrn, Ur Positive (*)     All other components within normal limits   CBC WITH AUTO DIFFERENTIAL - Abnormal; Notable for the following components:    WBC 16.5 (*)     MCH 31.5 (*)     Neutrophils % 87.7 (*)     Lymphocytes % 7.4 (*)     Neutrophils Absolute 14.5 (*)     All other components within normal limits   COMPREHENSIVE METABOLIC PANEL W/ REFLEX TO MG FOR LOW K - Abnormal; Notable for the following components:    Glucose 124 (*)     All other components within normal limits   MICROSCOPIC URINALYSIS - Abnormal; Notable for the following components:    Bacteria, UA None Seen (*)     All other components within normal limits   LIPASE   AMYLASE       All other labs were within normal range or notreturned as of this dictation.     RE-ASSESSMENT        EMERGENCY DEPARTMENT COURSE and DIFFERENTIAL DIAGNOSIS/MDM:   Vitals:    Vitals:    09/15/20 1515 09/15/20 2106   BP: 133/79

## 2020-09-16 ENCOUNTER — CARE COORDINATION (OUTPATIENT)
Dept: CARE COORDINATION | Age: 25
End: 2020-09-16

## 2020-09-16 ASSESSMENT — ENCOUNTER SYMPTOMS
VOMITING: 1
SHORTNESS OF BREATH: 0
ABDOMINAL PAIN: 1
NAUSEA: 1
COUGH: 0
EYE ITCHING: 0
BACK PAIN: 0
EYE DISCHARGE: 0
COLOR CHANGE: 0
PHOTOPHOBIA: 0
APNEA: 0

## 2020-09-16 NOTE — CARE COORDINATION
Attempted to reach pt via phone call for ED f/u. Listed number has been changed or disconnected. Attempted call to pt's emergency contact. No answer. KERVIN for CB.

## 2020-09-16 NOTE — ED PROVIDER NOTES
See PA note. Reviewed HPI, PE, labs, imaging. Agree with CORRY/CHRISTIANNE.      Shade Hong MD  09/16/20 [id number]

## 2020-09-18 ENCOUNTER — HOSPITAL ENCOUNTER (EMERGENCY)
Age: 25
Discharge: HOME | End: 2020-09-18
Attending: EMERGENCY MEDICINE
Payer: MEDICAID

## 2020-09-18 VITALS
HEIGHT: 71 IN | DIASTOLIC BLOOD PRESSURE: 81 MMHG | WEIGHT: 165 LBS | TEMPERATURE: 98.3 F | RESPIRATION RATE: 18 BRPM | BODY MASS INDEX: 23.1 KG/M2 | OXYGEN SATURATION: 98 % | SYSTOLIC BLOOD PRESSURE: 151 MMHG | HEART RATE: 99 BPM

## 2020-09-18 LAB
ALBUMIN SERPL-MCNC: 4.4 G/DL (ref 3.5–5.2)
ALP BLD-CCNC: 69 U/L (ref 40–130)
ALT SERPL-CCNC: 10 U/L (ref 5–41)
ANION GAP SERPL CALCULATED.3IONS-SCNC: 11 MMOL/L (ref 7–19)
AST SERPL-CCNC: 13 U/L (ref 5–40)
BASOPHILS ABSOLUTE: 0 K/UL (ref 0–0.2)
BASOPHILS RELATIVE PERCENT: 0.1 % (ref 0–1)
BILIRUB SERPL-MCNC: 0.6 MG/DL (ref 0.2–1.2)
BUN BLDV-MCNC: 8 MG/DL (ref 6–20)
CALCIUM SERPL-MCNC: 9.3 MG/DL (ref 8.6–10)
CHLORIDE BLD-SCNC: 103 MMOL/L (ref 98–111)
CO2: 26 MMOL/L (ref 22–29)
CREAT SERPL-MCNC: 0.7 MG/DL (ref 0.5–1.2)
EOSINOPHILS ABSOLUTE: 0 K/UL (ref 0–0.6)
EOSINOPHILS RELATIVE PERCENT: 0.1 % (ref 0–5)
GFR AFRICAN AMERICAN: >59
GFR NON-AFRICAN AMERICAN: >60
GLUCOSE BLD-MCNC: 113 MG/DL (ref 74–109)
HCT VFR BLD CALC: 44 % (ref 42–52)
HEMOGLOBIN: 15.2 G/DL (ref 14–18)
IMMATURE GRANULOCYTES #: 0.1 K/UL
LIPASE: 38 U/L (ref 13–60)
LYMPHOCYTES ABSOLUTE: 1 K/UL (ref 1.1–4.5)
LYMPHOCYTES RELATIVE PERCENT: 7.5 % (ref 20–40)
MCH RBC QN AUTO: 31.3 PG (ref 27–31)
MCHC RBC AUTO-ENTMCNC: 34.5 G/DL (ref 33–37)
MCV RBC AUTO: 90.7 FL (ref 80–94)
MONOCYTES ABSOLUTE: 0.4 K/UL (ref 0–0.9)
MONOCYTES RELATIVE PERCENT: 3 % (ref 0–10)
NEUTROPHILS ABSOLUTE: 12.3 K/UL (ref 1.5–7.5)
NEUTROPHILS RELATIVE PERCENT: 88.9 % (ref 50–65)
PDW BLD-RTO: 13.1 % (ref 11.5–14.5)
PLATELET # BLD: 277 K/UL (ref 130–400)
PMV BLD AUTO: 9.6 FL (ref 9.4–12.4)
POTASSIUM SERPL-SCNC: 3.8 MMOL/L (ref 3.5–5)
RBC # BLD: 4.85 M/UL (ref 4.7–6.1)
SODIUM BLD-SCNC: 140 MMOL/L (ref 136–145)
TOTAL PROTEIN: 6.9 G/DL (ref 6.6–8.7)
WBC # BLD: 13.8 K/UL (ref 4.8–10.8)

## 2020-09-18 PROCEDURE — 99999 PR OFFICE/OUTPT VISIT,PROCEDURE ONLY: CPT | Performed by: EMERGENCY MEDICINE

## 2020-09-18 PROCEDURE — 83690 ASSAY OF LIPASE: CPT

## 2020-09-18 PROCEDURE — 96374 THER/PROPH/DIAG INJ IV PUSH: CPT

## 2020-09-18 PROCEDURE — 36415 COLL VENOUS BLD VENIPUNCTURE: CPT

## 2020-09-18 PROCEDURE — 99282 EMERGENCY DEPT VISIT SF MDM: CPT

## 2020-09-18 PROCEDURE — 96375 TX/PRO/DX INJ NEW DRUG ADDON: CPT

## 2020-09-18 PROCEDURE — 2580000003 HC RX 258: Performed by: EMERGENCY MEDICINE

## 2020-09-18 PROCEDURE — 6360000002 HC RX W HCPCS: Performed by: EMERGENCY MEDICINE

## 2020-09-18 PROCEDURE — 80053 COMPREHEN METABOLIC PANEL: CPT

## 2020-09-18 PROCEDURE — 99283 EMERGENCY DEPT VISIT LOW MDM: CPT

## 2020-09-18 PROCEDURE — 85025 COMPLETE CBC W/AUTO DIFF WBC: CPT

## 2020-09-18 RX ORDER — PROMETHAZINE HYDROCHLORIDE 25 MG/1
25 SUPPOSITORY RECTAL EVERY 6 HOURS PRN
Qty: 15 SUPPOSITORY | Refills: 0 | Status: SHIPPED | OUTPATIENT
Start: 2020-09-18 | End: 2020-09-25

## 2020-09-18 RX ORDER — ONDANSETRON 4 MG/1
4 TABLET, ORALLY DISINTEGRATING ORAL EVERY 8 HOURS PRN
Qty: 30 TABLET | Refills: 0 | Status: SHIPPED | OUTPATIENT
Start: 2020-09-18 | End: 2020-09-23 | Stop reason: SDUPTHER

## 2020-09-18 RX ORDER — 0.9 % SODIUM CHLORIDE 0.9 %
1000 INTRAVENOUS SOLUTION INTRAVENOUS ONCE
Status: COMPLETED | OUTPATIENT
Start: 2020-09-18 | End: 2020-09-18

## 2020-09-18 RX ORDER — MORPHINE SULFATE 4 MG/ML
4 INJECTION, SOLUTION INTRAMUSCULAR; INTRAVENOUS ONCE
Status: COMPLETED | OUTPATIENT
Start: 2020-09-18 | End: 2020-09-18

## 2020-09-18 RX ORDER — ONDANSETRON 2 MG/ML
4 INJECTION INTRAMUSCULAR; INTRAVENOUS ONCE
Status: COMPLETED | OUTPATIENT
Start: 2020-09-18 | End: 2020-09-18

## 2020-09-18 RX ADMIN — MORPHINE SULFATE 4 MG: 4 INJECTION, SOLUTION INTRAMUSCULAR; INTRAVENOUS at 14:10

## 2020-09-18 RX ADMIN — ONDANSETRON 4 MG: 2 INJECTION INTRAMUSCULAR; INTRAVENOUS at 13:07

## 2020-09-18 RX ADMIN — SODIUM CHLORIDE 1000 ML: 9 INJECTION, SOLUTION INTRAVENOUS at 13:07

## 2020-09-18 ASSESSMENT — ENCOUNTER SYMPTOMS
SORE THROAT: 0
COUGH: 0
DIARRHEA: 0
VOMITING: 1
ABDOMINAL PAIN: 1
RHINORRHEA: 0
SHORTNESS OF BREATH: 0
NAUSEA: 1
BACK PAIN: 0

## 2020-09-18 ASSESSMENT — PAIN SCALES - GENERAL: PAINLEVEL_OUTOF10: 7

## 2020-09-18 NOTE — ED PROVIDER NOTES
[facility] EMERGENCY DEPT  eMERGENCY dEPARTMENT eNCOUnter      Pt Name: Zeenat Hannon  MRN: [id number]  [place] 1995  Date of evaluation: 9/18/2020  Provider: Naya Vigil MD    [address] [place] [address]       Chief Complaint   Patient presents with    Emesis     x4 days, seen in ER x3 days ago same complaint         HISTORY OF PRESENT ILLNESS   (Location/Symptom, Timing/Onset,Context/Setting, Quality, Duration, Modifying Factors, Severity)  Note limiting factors. Zeenat Hannon is a 31 y.o. male who presents to the emergency department for nausea and vomiting for the past 4 days. He was seen here 3 days ago with the same complaint had a negative CT scan of his abdomen at that time. Neg urine and UDS. He has recurrent episodes of similar presentations with no diagnosis he tells me. He has a appointment with [facility] September 30 and is going to try to get arranged to see gastroenterology but has never seen them in the past.  Complains of epigastric discomfort. No prior abdominal surgeries. HPI    NursingNotes were reviewed. REVIEW OF SYSTEMS    (2-9 systems for level 4, 10 or more for level 5)     Review of Systems   Constitutional: Positive for appetite change. Negative for chills and fever. HENT: Negative for rhinorrhea and sore throat. Respiratory: Negative for cough and shortness of breath. Cardiovascular: Negative for chest pain and leg swelling. Gastrointestinal: Positive for abdominal pain, nausea and vomiting. Negative for diarrhea. Genitourinary: Negative for dysuria, flank pain, frequency and hematuria. Musculoskeletal: Negative for back pain and neck pain. Neurological: Negative for dizziness and headaches. All other systems reviewed and are negative. PAST MEDICALHISTORY   History reviewed. No pertinent past medical history.       SURGICAL HISTORY       Past Surgical History:   Procedure Laterality Date    TONSILLECTOMY           CURRENT MEDICATIONS     Discharge Medication List as of 9/18/2020  2:07 PM      CONTINUE these medications which have NOT CHANGED    Details   promethazine (PHENERGAN) 25 MG tablet Take 1 tablet by mouth 4 times daily as needed for Nausea, Disp-20 tablet,R-0Print      pantoprazole (PROTONIX) 40 MG tablet Take 1 tablet by mouth 2 times daily (before meals), Disp-180 tablet,R-1Print      tiZANidine (ZANAFLEX) 4 MG tablet Take 1 tablet by mouth every 8 hours as needed (back pain), Disp-30 tablet, R-0Print      ibuprofen (ADVIL;MOTRIN) 800 MG tablet Take 1 tablet by mouth every 8 hours as needed for Pain, Disp-30 tablet,R-0Print      dicyclomine (BENTYL) 20 MG tablet Take 1 tablet by mouth 4 times daily, Disp-30 tablet, R-0Print      sucralfate (CARAFATE) 1 GM tablet Take 1 tablet by mouth 4 times daily, Disp-30 tablet, R-0Print             ALLERGIES     Patient has no known allergies. FAMILY HISTORY     History reviewed. No pertinent family history.        SOCIAL HISTORY       Social History     Socioeconomic History    Marital status:      Spouse name: None    Number of children: None    Years of education: None    Highest education level: None   Occupational History    None   Social Needs    Financial resource strain: None    Food insecurity     Worry: None     Inability: None    Transportation needs     Medical: None     Non-medical: None   Tobacco Use    Smoking status: Heavy Tobacco Smoker     Packs/day: 1.00     Types: Cigarettes    Smokeless tobacco: Never Used   Substance and Sexual Activity    Alcohol use: Not Currently     Comment: socially    Drug use: No    Sexual activity: None   Lifestyle    Physical activity     Days per week: None     Minutes per session: None    Stress: None   Relationships    Social connections     Talks on phone: None     Gets together: None     Attends Hindu service: None     Active member of club or organization: None     Attends meetings of clubs or organizations: None     Relationship status: None    Intimate partner violence     Fear of current or ex partner: None     Emotionally abused: None     Physically abused: None     Forced sexual activity: None   Other Topics Concern    None   Social History Narrative    None       SCREENINGS    [place] Coma Scale  Eye Opening: Spontaneous  Best Verbal Response: Oriented  Best Motor Response: Obeys commands  [place] Coma Scale Score: 15        PHYSICAL EXAM    (up to 7 for level 4, 8 or more for level 5)     ED Triage Vitals [09/18/20 1210]   BP Temp Temp src Pulse Resp SpO2 Height Weight   (!) 146/84 98.3 °F (36.8 °C) -- 101 22 95 % 5' 11\" (1.803 m) 165 lb (74.8 kg)       Physical Exam  Vitals signs and nursing note reviewed. Constitutional:       General: He is not in acute distress. Appearance: Normal appearance. He is well-developed. He is not ill-appearing, toxic-appearing or diaphoretic. HENT:      Head: Normocephalic and atraumatic. Nose: Nose normal.      Mouth/Throat:      Mouth: Mucous membranes are moist.   Eyes:      Conjunctiva/sclera: Conjunctivae normal.   Neck:      Musculoskeletal: Normal range of motion and neck supple. Trachea: No tracheal deviation. Cardiovascular:      Rate and Rhythm: Normal rate and regular rhythm. Heart sounds: Normal heart sounds. No murmur. Pulmonary:      Breath sounds: Normal breath sounds. No wheezing or rales. Abdominal:      Palpations: Abdomen is soft. There is no mass. Tenderness: There is abdominal tenderness in the epigastric area. There is no right CVA tenderness, left CVA tenderness, guarding or rebound. Musculoskeletal: Normal range of motion. Right lower leg: No edema. Left lower leg: No edema. Skin:     General: Skin is warm and dry. Neurological:      Mental Status: He is alert and oriented to person, place, and time.          DIAGNOSTIC RESULTS               No orders to display           LABS:  Labs Reviewed   CBC WITH AUTO DIFFERENTIAL - Abnormal; Notable for the following components:       Result Value    WBC 13.8 (*)     MCH 31.3 (*)     Neutrophils % 88.9 (*)     Lymphocytes % 7.5 (*)     Neutrophils Absolute 12.3 (*)     Lymphocytes Absolute 1.0 (*)     All other components within normal limits   COMPREHENSIVE METABOLIC PANEL - Abnormal; Notable for the following components:    Glucose 113 (*)     All other components within normal limits   LIPASE       All other labs were within normal range or not returned as of this dictation. EMERGENCY DEPARTMENT COURSE and DIFFERENTIAL DIAGNOSIS/MDM:   Vitals:    Vitals:    09/18/20 1210 09/18/20 1308 09/18/20 1332 09/18/20 1402   BP: (!) 146/84 (!) 141/78 (!) 141/79 (!) 151/81   Pulse: 101   99   Resp: 22   18   Temp: 98.3 °F (36.8 °C)   98.3 °F (36.8 °C)   TempSrc:    Oral   SpO2: 95% 100% 99% 98%   Weight: 165 lb (74.8 kg)      Height: 5' 11\" (1.803 m)          MDM  Number of Diagnoses or Management Options     Amount and/or Complexity of Data Reviewed  Clinical lab tests: ordered and reviewed  Tests in the radiology section of CPT®: reviewed  Independent visualization of images, tracings, or specimens: yes      VSS, neg ct 3 days ago, labs improving, ?cyclical vomiting, dont think this is gallbladder, lfts bili lipase all wnl, pt aspen PO, stable for DC, GI follow up, understands return precautions        CONSULTS:  None    PROCEDURES:  Unless otherwise noted below, none     Procedures    FINAL IMPRESSION      1. Non-intractable vomiting with nausea, unspecified vomiting type    2.  Acute abdominal pain          DISPOSITION/PLAN   DISPOSITION Decision To Discharge 09/18/2020 01:51:27 PM      PATIENT REFERRED TO:  Roge Baca MD  [place] [address]  [phone]    Schedule an appointment as soon as possible for a visit in 1 week        DISCHARGE MEDICATIONS:  Discharge Medication List as of 9/18/2020  2:07 PM      START taking these medications    Details   [facility] ODT) 4 MG disintegrating tablet Take 1 tablet by mouth every 8 hours as needed for Nausea or Vomiting, Disp-30 tablet,R-0Print      promethazine (PROMETHEGAN) 25 MG suppository Place 1 suppository rectally every 6 hours as needed for Nausea, Disp-15 suppository,R-0Print                (Please note that portions of this note were completed with a voice recognition program.  Efforts were made to edit thedictations but occasionally words are mis-transcribed.)    Александр Sears MD (electronically signed)  Attending Emergency Physician        Corby Galvez MD  09/18/20 6252

## 2020-09-20 ENCOUNTER — CARE COORDINATION (OUTPATIENT)
Dept: CARE COORDINATION | Age: 25
End: 2020-09-20

## 2020-09-21 ENCOUNTER — TELEPHONE (OUTPATIENT)
Dept: GASTROENTEROLOGY | Age: 25
End: 2020-09-21

## 2020-09-23 ENCOUNTER — VIRTUAL VISIT (OUTPATIENT)
Dept: GASTROENTEROLOGY | Age: 25
End: 2020-09-23
Payer: MEDICAID

## 2020-09-23 PROCEDURE — 4004F PT TOBACCO SCREEN RCVD TLK: CPT | Performed by: NURSE PRACTITIONER

## 2020-09-23 PROCEDURE — G8420 CALC BMI NORM PARAMETERS: HCPCS | Performed by: NURSE PRACTITIONER

## 2020-09-23 PROCEDURE — 99204 OFFICE O/P NEW MOD 45 MIN: CPT | Performed by: NURSE PRACTITIONER

## 2020-09-23 PROCEDURE — G8428 CUR MEDS NOT DOCUMENT: HCPCS | Performed by: NURSE PRACTITIONER

## 2020-09-23 RX ORDER — SUCRALFATE 1 G/1
1 TABLET ORAL 4 TIMES DAILY
Qty: 120 TABLET | Refills: 1 | Status: SHIPPED | OUTPATIENT
Start: 2020-09-23 | End: 2022-03-09

## 2020-09-23 RX ORDER — ONDANSETRON 4 MG/1
4 TABLET, ORALLY DISINTEGRATING ORAL EVERY 8 HOURS PRN
Qty: 30 TABLET | Refills: 0 | Status: SHIPPED | OUTPATIENT
Start: 2020-09-23 | End: 2020-11-04 | Stop reason: SDUPTHER

## 2020-09-23 ASSESSMENT — ENCOUNTER SYMPTOMS
ANAL BLEEDING: 0
CHOKING: 0
COUGH: 0
BLOOD IN STOOL: 0
NAUSEA: 1
CONSTIPATION: 0
SHORTNESS OF BREATH: 0
ABDOMINAL PAIN: 1
TROUBLE SWALLOWING: 0
RECTAL PAIN: 0
ABDOMINAL DISTENTION: 0
VOMITING: 1
DIARRHEA: 0

## 2020-09-23 NOTE — PATIENT INSTRUCTIONS
Schedule endoscopy    Nothing to eat or drink after midnight., the night before the procedure  You will not be able to drive for 24 hours after the procedure due to sedation. Must have a responsible adult, 25 year or older, to accompany you and drive you home. No aspirin, ibuprofen, naproxen, fish oil or vitamin E for 5 days before procedure. Continue current medications. If you are on blood thinners, clearance from the prescribing physician will be obtained before your procedure is scheduled. If biopsies are taken during the procedure they will be sent to a pathologist for analysis. You will be notified by mail of the pathology results in 2-3 weeks. Your physician may also schedule a follow up appointment with the nurse practitioner to discuss pathology, symptoms or to check if you have had any problems related to your procedure. If you prefer not to return to the office after your procedure, please discuss this with your physician on the day of your procedure. Patient Education        Gastroesophageal Reflux Disease (GERD): Care Instructions  Your Care Instructions     Gastroesophageal reflux disease (GERD) is the backward flow of stomach acid into the esophagus. The esophagus is the tube that leads from your throat to your stomach. A one-way valve prevents the stomach acid from backing up into this tube. When you have GERD, this valve does not close tightly enough. This can also cause pain and swelling in your esophagus (esophagitis). If you have mild GERD symptoms including heartburn, you may be able to control the problem with antacids or over-the-counter medicine. Changing your diet and eating habits, such as not eating late at night, losing weight, and making other lifestyle changes can also help reduce symptoms. Follow-up care is a key part of your treatment and safety. Be sure to make and go to all appointments, and call your doctor if you are having problems.  It's also a good idea to know your test results and keep a list of the medicines you take. How can you care for yourself at home? · Take your medicines exactly as prescribed. Call your doctor if you think you are having a problem with your medicine. · Your doctor may recommend over-the-counter medicine. For mild or occasional indigestion, antacids, such as Tums, Gaviscon, Mylanta, or Maalox, may help. Your doctor also may recommend over-the-counter acid reducers, such as Pepcid AC (famotidine), Tagamet HB (cimetidine), or Prilosec (omeprazole). Read and follow all instructions on the label. If you use these medicines often, talk with your doctor. · Change your eating habits. ? It's best to eat several small meals instead of two or three large meals. ? After you eat, wait 2 to 3 hours before you lie down. ? Chocolate, mint, and alcohol can make GERD worse. ? Spicy foods, foods that have a lot of acid (like tomatoes and oranges), and coffee can make GERD symptoms worse in some people. If your symptoms are worse after you eat a certain food, you may want to stop eating that food to see if your symptoms get better. · Do not smoke or chew tobacco. Smoking can make GERD worse. If you need help quitting, talk to your doctor about stop-smoking programs and medicines. These can increase your chances of quitting for good. · If you have GERD symptoms at night, raise the head of your bed 6 to 8 inches by putting the frame on blocks or placing a foam wedge under the head of your mattress. (Adding extra pillows does not work.)  · Do not wear tight clothing around your middle. · Lose weight if you need to. Losing just 5 to 10 pounds can help. When should you call for help? Call your doctor now or seek immediate medical care if:  · You have new or different belly pain. · Your stools are black and tarlike or have streaks of blood.   Watch closely for changes in your health, and be sure to contact your doctor if:  · Your symptoms have not improved after 2 days. · Food seems to catch in your throat or chest.  Where can you learn more? Go to https://[email or web address].[email or web address].[email or web address]. org and sign in to your [employer] account. Enter W998 in the [employer] box to learn more about \"Gastroesophageal Reflux Disease (GERD): Care Instructions. \"     If you do not have an account, please click on the \"Sign Up Now\" link. Current as of: August 12, 2019               Content Version: 12.5  © 4809-6765 Healthwise, Incorporated. Care instructions adapted under license by [facility] ([facility]). If you have questions about a medical condition or this instruction, always ask your healthcare professional. [address] any warranty or liability for your use of this information.

## 2020-09-23 NOTE — PROGRESS NOTES
Allergic/Immunologic: Negative for food allergies. All other systems reviewed and are negative. Prior to Visit Medications    Medication Sig Taking? Authorizing Provider   ondansetron (ZOFRAN ODT) 4 MG disintegrating tablet Take 1 tablet by mouth every 8 hours as needed for Nausea or Vomiting Yes HODAN Hall NP   sucralfate (CARAFATE) 1 GM tablet Take 1 tablet by mouth 4 times daily Yes HODAN Hall NP   promethazine (PROMETHEGAN) 25 MG suppository Place 1 suppository rectally every 6 hours as needed for Nausea  Adri Fish MD   pantoprazole (PROTONIX) 40 MG tablet Take 1 tablet by mouth 2 times daily (before meals)  COBY Voss   tiZANidine (ZANAFLEX) 4 MG tablet Take 1 tablet by mouth every 8 hours as needed (back pain)  HODAN Zhang NP   ibuprofen (ADVIL;MOTRIN) 800 MG tablet Take 1 tablet by mouth every 8 hours as needed for Pain  HODAN Zhang NP   dicyclomine (BENTYL) 20 MG tablet Take 1 tablet by mouth 4 times daily  HODAN Maxwell       Social History     Tobacco Use    Smoking status: Heavy Tobacco Smoker     Packs/day: 1.00     Types: Cigarettes    Smokeless tobacco: Never Used   Substance Use Topics    Alcohol use: Not Currently     Comment: socially    Drug use: No        No Known Allergies, No past medical history on file.,   Social History     Tobacco Use    Smoking status: Heavy Tobacco Smoker     Packs/day: 1.00     Types: Cigarettes    Smokeless tobacco: Never Used   Substance Use Topics    Alcohol use: Not Currently     Comment: socially    Drug use: No   , No family history on file.     PHYSICAL EXAMINATION:  [ INSTRUCTIONS:  \"[x]\" Indicates a positive item  \"[]\" Indicates a negative item  -- DELETE ALL ITEMS NOT EXAMINED]  Vital Signs: (As obtained by patient/caregiver or practitioner observation)    Blood pressure-  Heart rate-    Respiratory rate-    Temperature-  Pulse oximetry-     Constitutional: [x] Appears well-developed

## 2020-10-01 ENCOUNTER — TELEPHONE (OUTPATIENT)
Dept: GASTROENTEROLOGY | Age: 25
End: 2020-10-01

## 2020-10-04 ENCOUNTER — OFFICE VISIT (OUTPATIENT)
Age: 25
End: 2020-10-04

## 2020-10-04 VITALS — TEMPERATURE: 97.4 F | HEART RATE: 87 BPM | OXYGEN SATURATION: 98 %

## 2020-10-06 LAB — SARS-COV-2, NAA: NOT DETECTED

## 2020-10-08 ENCOUNTER — HOSPITAL ENCOUNTER (OUTPATIENT)
Age: 25
Setting detail: OUTPATIENT SURGERY
Discharge: HOME | End: 2020-10-08
Attending: INTERNAL MEDICINE | Admitting: INTERNAL MEDICINE
Payer: MEDICAID

## 2020-10-08 ENCOUNTER — ANESTHESIA (OUTPATIENT)
Dept: ENDOSCOPY | Age: 25
End: 2020-10-08
Payer: MEDICAID

## 2020-10-08 ENCOUNTER — ANESTHESIA EVENT (OUTPATIENT)
Dept: ENDOSCOPY | Age: 25
End: 2020-10-08
Payer: MEDICAID

## 2020-10-08 VITALS
RESPIRATION RATE: 18 BRPM | HEIGHT: 71 IN | HEART RATE: 59 BPM | TEMPERATURE: 98.7 F | BODY MASS INDEX: 22.4 KG/M2 | WEIGHT: 160 LBS | SYSTOLIC BLOOD PRESSURE: 124 MMHG | DIASTOLIC BLOOD PRESSURE: 64 MMHG | OXYGEN SATURATION: 100 %

## 2020-10-08 VITALS
RESPIRATION RATE: 1 BRPM | SYSTOLIC BLOOD PRESSURE: 114 MMHG | DIASTOLIC BLOOD PRESSURE: 57 MMHG | OXYGEN SATURATION: 98 %

## 2020-10-08 PROCEDURE — 6360000002 HC RX W HCPCS: Performed by: NURSE ANESTHETIST, CERTIFIED REGISTERED

## 2020-10-08 PROCEDURE — 3700000001 HC ADD 15 MINUTES (ANESTHESIA): Performed by: INTERNAL MEDICINE

## 2020-10-08 PROCEDURE — 2500000003 HC RX 250 WO HCPCS: Performed by: NURSE ANESTHETIST, CERTIFIED REGISTERED

## 2020-10-08 PROCEDURE — 7100000011 HC PHASE II RECOVERY - ADDTL 15 MIN: Performed by: INTERNAL MEDICINE

## 2020-10-08 PROCEDURE — 2709999900 HC NON-CHARGEABLE SUPPLY: Performed by: INTERNAL MEDICINE

## 2020-10-08 PROCEDURE — 3609017100 HC EGD: Performed by: INTERNAL MEDICINE

## 2020-10-08 PROCEDURE — 7100000010 HC PHASE II RECOVERY - FIRST 15 MIN: Performed by: INTERNAL MEDICINE

## 2020-10-08 PROCEDURE — 3700000000 HC ANESTHESIA ATTENDED CARE: Performed by: INTERNAL MEDICINE

## 2020-10-08 PROCEDURE — 2580000003 HC RX 258: Performed by: INTERNAL MEDICINE

## 2020-10-08 PROCEDURE — 43235 EGD DIAGNOSTIC BRUSH WASH: CPT | Performed by: INTERNAL MEDICINE

## 2020-10-08 RX ORDER — LIDOCAINE HYDROCHLORIDE 10 MG/ML
INJECTION, SOLUTION EPIDURAL; INFILTRATION; INTRACAUDAL; PERINEURAL PRN
Status: DISCONTINUED | OUTPATIENT
Start: 2020-10-08 | End: 2020-10-08 | Stop reason: SDUPTHER

## 2020-10-08 RX ORDER — SODIUM CHLORIDE, SODIUM LACTATE, POTASSIUM CHLORIDE, CALCIUM CHLORIDE 600; 310; 30; 20 MG/100ML; MG/100ML; MG/100ML; MG/100ML
INJECTION, SOLUTION INTRAVENOUS CONTINUOUS
Status: DISCONTINUED | OUTPATIENT
Start: 2020-10-08 | End: 2020-10-08 | Stop reason: HOSPADM

## 2020-10-08 RX ORDER — PROPOFOL 10 MG/ML
INJECTION, EMULSION INTRAVENOUS PRN
Status: DISCONTINUED | OUTPATIENT
Start: 2020-10-08 | End: 2020-10-08 | Stop reason: SDUPTHER

## 2020-10-08 RX ADMIN — SODIUM CHLORIDE, POTASSIUM CHLORIDE, SODIUM LACTATE AND CALCIUM CHLORIDE: 600; 310; 30; 20 INJECTION, SOLUTION INTRAVENOUS at 09:06

## 2020-10-08 RX ADMIN — PROPOFOL 200 MG: 10 INJECTION, EMULSION INTRAVENOUS at 10:04

## 2020-10-08 RX ADMIN — LIDOCAINE HYDROCHLORIDE 30 MG: 10 INJECTION, SOLUTION EPIDURAL; INFILTRATION; INTRACAUDAL; PERINEURAL at 10:04

## 2020-10-08 ASSESSMENT — PAIN SCALES - GENERAL
PAINLEVEL_OUTOF10: 0
PAINLEVEL_OUTOF10: 0

## 2020-10-08 NOTE — H&P
Patient Name: Clif Corona  : 1995  MRN: [id number]  DATE: 10/08/20    Allergies: Allergies   Allergen Reactions    Carrot Flavor [Flavoring Agent] Hives     Breaks out in hives        ENDOSCOPY  History and Physical    Procedure:    [] Diagnostic Colonoscopy       [] Screening Colonoscopy  [x] EGD      [] ERCP      [] EUS       [] Other    [x] Previous office notes/History and Physical reviewed from the patients chart. Please see EMR for further details of HPI. I have examined the patient's status immediately prior to the procedure and:      Indications/HPI:  1. Cyclical vomiting with nausea  2. Generalized abdominal pain    []Abdominal Pain   []Cancer- GI/Lung     []Fhx of colon CA/polyps  []History of Polyps  []Barretts            []Melena  []Abnormal Imaging              []Dysphagia              []Persistent Pneumonia   []Anemia                            []Food Impaction        []History of Polyps  [] GI Bleed             []Pulmonary nodule/Mass   []Change in bowel habits []Heartburn/Reflux  []Rectal Bleed (BRBPR)  []Chest Pain - Non Cardiac []Heme (+) Stool []Ulcers  []Constipation  []Hemoptysis  []Varices  []Diarrhea  []Hypoxemia    []Nausea/Vomiting   []Screening   []Crohns/Colitis  []Other:     Anesthesia:   [x] MAC [] Moderate Sedation   [] General   [] None     ROS: 12 pt Review of Symptoms was negative unless mentioned above    Medications:   Prior to Admission medications    Medication Sig Start Date End Date Taking?  Authorizing Provider   sucralfate (CARAFATE) 1 GM tablet Take 1 tablet by mouth 4 times daily 20  Yes HODAN Ellington NP   pantoprazole (PROTONIX) 40 MG tablet Take 1 tablet by mouth 2 times daily (before meals) 9/15/20  Yes COBY Cedeño   tiZANidine (ZANAFLEX) 4 MG tablet Take 1 tablet by mouth every 8 hours as needed (back pain) 10/21/19  Yes HODAN Eden NP   ondansetron (ZOFRAN ODT) 4 MG disintegrating tablet Take 1 tablet by mouth every 8 hours as needed for Nausea or Vomiting 9/23/20   HODAN Alamo - NP   ibuprofen (ADVIL;MOTRIN) 800 MG tablet Take 1 tablet by mouth every 8 hours as needed for Pain 10/21/19 9/15/20  HODAN Hammer NP       Past Medical History:  Past Medical History:   Diagnosis Date    Fracture, pelvis closed ([facility] [address] 01/03/2015    fell off roof    Rib fractures 01/03/2015    fell off roof       Past Surgical History:  Past Surgical History:   Procedure Laterality Date    TONSILLECTOMY         Social History:  Social History     Tobacco Use    Smoking status: Heavy Tobacco Smoker     Packs/day: 1.00     Types: Cigarettes    Smokeless tobacco: Never Used   Substance Use Topics    Alcohol use: Not Currently     Comment: socially    Drug use: No       Vital Signs:   Vitals:    10/08/20 0907   BP: 122/73   Pulse: 69   Resp: 16   Temp: 98.7 °F (37.1 °C)   SpO2: 100%        Physical Exam:  Cardiac:  [x]WNL  []Comments:  Pulmonary:  [x]WNL   []Comments:  Neuro/Mental Status:  [x]WNL  []Comments:  Abdominal:  [x]WNL    []Comments:  Other:   []WNL  []Comments:    Informed Consent:  The risks and benefits of the procedure have been discussed with either the patient or if they cannot consent, their representative. Assessment:  Patient examined and appropriate for planned sedation and procedure. Plan:  Proceed with planned sedation and procedure as above.          Celena Sylvester MD

## 2020-10-08 NOTE — ANESTHESIA POSTPROCEDURE EVALUATION
Department of Anesthesiology  Postprocedure Note    Patient: Clif Corona  MRN: [id number]  YOB: 1995  Date of evaluation: 10/8/2020  Time:  10:14 AM     Procedure Summary     Date:  10/08/20 Room / Location:  [address]    Anesthesia Start:  0379 Anesthesia Stop:  7763    Procedure:  EGD ESOPHAGOGASTRODUODENOSCOPY (N/A ) Diagnosis:  (CYCLIC N/V)    Surgeon:  Irvin Sanford MD Responsible Provider:  HODAN Meier CRNA    Anesthesia Type:  MAC ASA Status:  2          Anesthesia Type: MAC    Jhonny Phase I: Hjonny Score: 10    Jhonny Phase II:      Last vitals: Reviewed and per EMR flowsheets.        Anesthesia Post Evaluation    Patient location during evaluation: floor  Patient participation: complete - patient participated  Level of consciousness: responsive to verbal stimuli  Pain score: 0  Airway patency: patent  Nausea & Vomiting: no vomiting and no nausea  Complications: no  Cardiovascular status: hemodynamically stable  Respiratory status: acceptable and room air  Hydration status: stable  Comments: Normothermic

## 2020-10-08 NOTE — OP NOTE
Endoscopic Procedure Note    Patient: Carol Patel : 1995  Med Rec#: [id number] Acc#: [id number]     Primary Care Provider No primary care provider on file. Endoscopist: Kirby Pena MD    Date of Procedure:  10/8/2020    Procedure:   1. EGD up to the distal duodenum    Indications:   1. Cyclical vomiting with nausea  2. Generalized abdominal pain    Anesthesia:  Sedation was administered by anesthesia who monitored the patient during the procedure. Estimated Blood Loss: minimal    Procedure:   After reviewing the patient's chart and obtaining informed consent, the patient was placed in the left lateral decubitus position. A forward-viewing Olympus endoscope was lubricated and inserted through the mouth into the oropharynx. Under direct visualization, the upper esophagus was intubated. The scope was advanced to the level of the third portion of duodenum. Scope was slowly withdrawn with careful inspection of the mucosal surfaces. The scope was retroflexed for inspection of the gastric fundus and incisura. Findings and maneuvers are listed in impression below. The patient tolerated the procedure well. The scope was removed. There were no immediate complications. Findings/IMPRESSION:  Esophagus: normal and a normal EG junction at 40 cm. NO erosions or ulcers or nodules or strictures or webs or rings or mass lesions or extrinsic compression or diverticula noted. There is NO hiatal hernia present. Stomach:  Normal.    NO ulcers or masses or gastric outlet obstruction or retained food or fluid. Rugae were normal and lumen distended well with insufflation. Retroflexed views otherwise revealed a normal GE junction, fundus and cardia as well. Duodenum: normal       RECOMMENDATIONS:    1. Continue current meds and resume his previous diet.   2. PPI and anti-emetics prn  3. - GI clinic f/u 4-6 weeks   - Keep scheduled f/u appts with other MDs     The results were discussed with the patient and family. A copy of the images obtained were given to the patient.      Francia Bonner MD  10/8/2020  9:53 AM

## 2020-11-04 ENCOUNTER — VIRTUAL VISIT (OUTPATIENT)
Dept: GASTROENTEROLOGY | Age: 25
End: 2020-11-04
Payer: MEDICAID

## 2020-11-04 PROCEDURE — 99214 OFFICE O/P EST MOD 30 MIN: CPT | Performed by: NURSE PRACTITIONER

## 2020-11-04 PROCEDURE — 4004F PT TOBACCO SCREEN RCVD TLK: CPT | Performed by: NURSE PRACTITIONER

## 2020-11-04 PROCEDURE — G8420 CALC BMI NORM PARAMETERS: HCPCS | Performed by: NURSE PRACTITIONER

## 2020-11-04 PROCEDURE — G8428 CUR MEDS NOT DOCUMENT: HCPCS | Performed by: NURSE PRACTITIONER

## 2020-11-04 PROCEDURE — G8484 FLU IMMUNIZE NO ADMIN: HCPCS | Performed by: NURSE PRACTITIONER

## 2020-11-04 RX ORDER — ONDANSETRON 4 MG/1
4 TABLET, ORALLY DISINTEGRATING ORAL EVERY 8 HOURS PRN
Qty: 30 TABLET | Refills: 1 | Status: SHIPPED | OUTPATIENT
Start: 2020-11-04 | End: 2020-12-16 | Stop reason: SDUPTHER

## 2020-11-04 ASSESSMENT — ENCOUNTER SYMPTOMS
ANAL BLEEDING: 1
DIARRHEA: 0
ABDOMINAL DISTENTION: 0
VOMITING: 1
RECTAL PAIN: 0
TROUBLE SWALLOWING: 0
ABDOMINAL PAIN: 1
NAUSEA: 1
COUGH: 0
CHOKING: 0
CONSTIPATION: 0
BLOOD IN STOOL: 0
SHORTNESS OF BREATH: 0

## 2020-11-04 NOTE — PROGRESS NOTES
2020    TELEHEALTH EVALUATION -- Audio/Visual (During KKCGJ-28 public health emergency)    HPI:    Ramya Marti (:  1995) has requested an audio/video evaluation for the following concern(s):  Chief Complaint   Patient presents with    Follow Up After Procedure         Ramya Marti is here for follow up after EGD on 10/8/2020. During his last visit, he had c/o nausea and vomiting. Today, he reports symptoms have improved. He is currently taking Protonix 40 mg po BID and Carafate prn. He will take Phenergan or Zofran prn for nausea. He states overall, nausea is better, he has not had another spell recently. Typically, spells comes every 6 months or so. Pt also mentions episodes of lower abdominal pain with rectal bleeding. Pain starts, then he will have the urge to have a BM. However, there will be no stool only blood. This has been occurring for the last couple of years as well. This comes and goes. There are no aggravating or alleviating factors. EGD Findings/IMPRESSION 10/8/2020:  Esophagus: normal and a normal EG junction at 40 cm. NO erosions or ulcers or nodules or strictures or webs or rings or mass lesions or extrinsic compression or diverticula noted. There is NO hiatal hernia present. Stomach:  Normal.  NO ulcers or masses or gastric outlet obstruction or retained food or fluid. Rugae were normal and lumen distended well with insufflation. Retroflexed views otherwise revealed a normal GE junction, fundus and cardia as well. Duodenum: normal  RECOMMENDATIONS:    1. Continue current meds and resume his previous diet. 2. PPI and anti-emetics prn  3. - GI clinic f/u 4-6 weeks   - Keep scheduled f/u appts with other MDs     Review of Systems   Constitutional: Negative for activity change, appetite change, fatigue, fever and unexpected weight change. HENT: Negative for trouble swallowing. Respiratory: Negative for cough, choking and shortness of breath. Cardiovascular: Negative for chest pain. Gastrointestinal: Positive for abdominal pain, anal bleeding, nausea and vomiting. Negative for abdominal distention, blood in stool, constipation, diarrhea and rectal pain. Allergic/Immunologic: Negative for food allergies. All other systems reviewed and are negative. Prior to Visit Medications    Medication Sig Taking? Authorizing Provider   ondansetron (ZOFRAN ODT) 4 MG disintegrating tablet Take 1 tablet by mouth every 8 hours as needed for Nausea or Vomiting  HODAN Gonzalez - EVI   sucralfate (CARAFATE) 1 GM tablet Take 1 tablet by mouth 4 times daily  HODAN Gonzalez - NP   pantoprazole (PROTONIX) 40 MG tablet Take 1 tablet by mouth 2 times daily (before meals)  COBY Montano   tiZANidine (ZANAFLEX) 4 MG tablet Take 1 tablet by mouth every 8 hours as needed (back pain)  HODAN Ayon NP   ibuprofen (ADVIL;MOTRIN) 800 MG tablet Take 1 tablet by mouth every 8 hours as needed for Pain  HODAN Ayon NP       Social History     Tobacco Use    Smoking status: Heavy Tobacco Smoker     Packs/day: 1.00     Types: Cigarettes    Smokeless tobacco: Never Used   Substance Use Topics    Alcohol use: Not Currently     Comment: socially    Drug use: No        Allergies   Allergen Reactions    Carrot Flavor [Flavoring Agent] Hives     Breaks out in hives   ,   Past Medical History:   Diagnosis Date    Fracture, pelvis closed ([facility] [address] 01/03/2015    fell off roof    Rib fractures 01/03/2015    fell off roof   ,   Social History     Tobacco Use    Smoking status: Heavy Tobacco Smoker     Packs/day: 1.00     Types: Cigarettes    Smokeless tobacco: Never Used   Substance Use Topics    Alcohol use: Not Currently     Comment: socially    Drug use: No   , No family history on file.     PHYSICAL EXAMINATION:  [ INSTRUCTIONS:  \"[x]\" Indicates a positive item  \"[]\" Indicates a negative item  -- DELETE ALL ITEMS NOT EXAMINED]  Vital Signs: (As obtained by patient/caregiver or practitioner observation)    Blood pressure-  Heart rate-    Respiratory rate-    Temperature-  Pulse oximetry-     Constitutional: [x] Appears well-developed and well-nourished [x] No apparent distress      [] Abnormal-   Mental status  [x] Alert and awake  [x] Oriented to person/place/time [x]Able to follow commands      Eyes:  EOM    [x]  Normal  [] Abnormal-  Sclera  [x]  Normal  [] Abnormal -         Discharge [x]  None visible  [] Abnormal -    HENT:   [x] Normocephalic, atraumatic. [] Abnormal   [x] Mouth/Throat: Mucous membranes are moist.     External Ears [x] Normal  [] Abnormal-     Neck: [x] No visualized mass     Pulmonary/Chest: [x] Respiratory effort normal.  [x] No visualized signs of difficulty breathing or respiratory distress        [] Abnormal-      Musculoskeletal:   [x] Normal gait with no signs of ataxia         [x] Normal range of motion of neck        [] Abnormal-       Neurological:        [x] No Facial Asymmetry (Cranial nerve 7 motor function) (limited exam to video visit)          [x] No gaze palsy        [] Abnormal-         Skin:        [x] No significant exanthematous lesions or discoloration noted on facial skin         [] Abnormal-            Psychiatric:       [x] Normal Affect [x] No Hallucinations        [] Abnormal-     Other pertinent observable physical exam findings-     ASSESSMENT/PLAN:  1. Rectal bleeding  2. Lower abdominal pain  3. Nausea    - Continue Protonix at BID dosing for 2 more months, then decrease to daily  - Ok to use Carafate prn  - Continue Zofran prn, refills provided  - Schedule colonoscopy  Instruct on bowel prep. Nothing to eat or drink after midnight the day of the exam.  Unable to drive for 24 hours after the procedure. No aspirin or nonsteroidal anti-inflammatories for 5 days before procedure.   I have discussed the benefits, alternatives, and risks (including bleeding, perforation and death)  for pursuing Endoscopy (EGD/Colonscopy/EUS/ERCP) with the patient and they are willing to continue. We also discussed the need for anesthesia, IV access, proper dietary changes, medication changes if necessary, and need for bowel prep (if ordered) prior to their Endoscopic procedure. They are aware they must have someone accompany them to their scheduled procedure to drive them home - they agree to the above and are willing to continue. Return for procedure follow up or sooner if needed. Jose Heredia is a 31 y.o. male being evaluated by a Virtual Visit (video visit) encounter to address concerns as mentioned above. A caregiver was present when appropriate. Due to this being a TeleHealth encounter (During RNVTQ-54 public [facility] emergency), evaluation of the following organ systems was limited: Vitals/Constitutional/EENT/Resp/CV/GI//MS/Neuro/Skin/Heme-Lymph-Imm. Pursuant to the emergency declaration under the [address] [place] [address] authority and the [employer] Act, this Virtual Visit was conducted with patient's (and/or legal guardian's) consent, to reduce the patient's risk of exposure to COVID-19 and provide necessary medical care. The patient (and/or legal guardian) has also been advised to contact this office for worsening conditions or problems, and seek emergency medical treatment and/or call 911 if deemed necessary. Patient identification was verified at the start of the visit: Yes    Total time spent on this encounter: Not billed by time    Services were provided through a video synchronous discussion virtually to substitute for in-person clinic visit. Patient and provider were located at their individual homes. --HODAN Larios Mai, NP on 11/4/2020 at 9:38 AM    An electronic signature was used to authenticate this note.

## 2020-11-04 NOTE — PATIENT INSTRUCTIONS
Schedule colonoscopy. No aspirin, ibuprofen, naproxen, fish oil or vitamin E for 5 days before procedure. Do not eat or drink after midnight the day of the procedure. Allowed medications can be taken with a small sip of water. Please review your prep instructions for allowed medications. You will not be able to drive for 24 hours after the procedure due to sedation. You must have a responsible adult, 25 year or older, to accompany you and drive you home the day of your procedure. If you are on blood thinners, clearance from the prescribing physician will be obtained before your procedure is scheduled. If it is determined it is not safe to hold these medications for a short time an alternative procedure for evaluation may be recommended. Risks of colonoscopy include, but are not limited to, perforation, bleeding, and infection, Risk of perforation and bleeding are increased if there is a polyp removed. Anesthesia risks will be reviewed with you before the procedure by a member of the anesthesia department. Your physician may also schedule a follow up appointment with the nurse practitioner to discuss pathology, symptoms or to check if you have had any problems related to your procedure. If you prefer not to return to the office after your procedure please discuss this with your physician on the day of your colonoscopy. The physician will talk with you and/or your family after the procedure is completed. Final recommendations are based on the pathologist report if biopsies or specimens are taken. If polyps are removed during the procedure they will be sent to a pathologist for analysis. Unless you have a follow up appointment scheduled, you will be notified by mail of the pathology results within 4 weeks. If you have not received results after 4 weeks you may call the office to obtain this information. For Colonoscopy:   You will be given specific directions regarding restrictions to diet and bowel prep instructions including laxatives. Please read these instructions one week prior to your scheduled procedure to ensure that you are prepared. If you have any questions regarding these instructions please call our office Mon through Fri from 8:00 am to 4:00 pm.     Follow prep instructions provided for bowel prep. Take all of the bowel prep as directed. If you are having problems with nausea, stop your prep for 30-45 min to allow the nausea to subside before resuming your prep. It is important to drink plenty of fluids throughout the day before taking your laxatives. This will help to protect your kidneys, prevent dehydration and maximize the effect of the bowel prep. Your diet before a colonoscopy bowel preparation is very important to ensure a successful colon exam. It is recommended to consider certain changes to your diet three to four days prior to the procedure. Remember that your bowels need to be completely empty for the exam.    What foods are good to eat? Cut down on heavy solid foods three to four days before the procedure and start introducing lighter meals to your diet. The following food suggestions are a good part of your diet before a colonoscopy bowel preparation.  Light meat that is easily digestible such as chicken (without the skin)    Potatoes without skin    Cheese    Eggs    A light meal of steamed white fish    Light clear soups    Foods and drinks to avoid  Avoid foods that contain too much fiber. Stay clear of dark colored beverages. They can stick to the walls of the digestive tract and make it difficult to differentiate from blood.  Some of these foods are:   Red meat, rice, nuts and vegetables    Milk, other milk based fluids and cream    Most fruit and puddings    Whole grain pasta    Cereals, bran and seeds    Colored beverages, especially those that are red or purple in color    Red colored Jell-O     On the day before the colonoscopy, continue to drink plenty of clear fluids. It is important   to keep yourself hydrated before the exam.     Please follow all instructions as provided for cleansing the bowel. Failure to have an adequately prepped colon may cause you to have incomplete exam with further testing required.      [email or web address]

## 2020-11-12 RX ORDER — SODIUM CHLORIDE 9 MG/ML
INJECTION, SOLUTION INTRAVENOUS CONTINUOUS
Status: CANCELLED | OUTPATIENT
Start: 2020-11-12

## 2020-11-14 ENCOUNTER — OFFICE VISIT (OUTPATIENT)
Age: 25
End: 2020-11-14

## 2020-11-14 VITALS — OXYGEN SATURATION: 98 % | HEART RATE: 75 BPM | TEMPERATURE: 97.1 F

## 2020-11-15 LAB — SARS-COV-2, PCR: NOT DETECTED

## 2020-11-19 ENCOUNTER — ANESTHESIA EVENT (OUTPATIENT)
Dept: ENDOSCOPY | Age: 25
End: 2020-11-19
Payer: MEDICAID

## 2020-11-19 ENCOUNTER — ANESTHESIA (OUTPATIENT)
Dept: ENDOSCOPY | Age: 25
End: 2020-11-19
Payer: MEDICAID

## 2020-11-19 ENCOUNTER — HOSPITAL ENCOUNTER (OUTPATIENT)
Age: 25
Setting detail: OUTPATIENT SURGERY
Discharge: HOME | End: 2020-11-19
Attending: INTERNAL MEDICINE | Admitting: INTERNAL MEDICINE
Payer: MEDICAID

## 2020-11-19 VITALS — SYSTOLIC BLOOD PRESSURE: 101 MMHG | OXYGEN SATURATION: 97 % | DIASTOLIC BLOOD PRESSURE: 57 MMHG

## 2020-11-19 VITALS
BODY MASS INDEX: 22.4 KG/M2 | WEIGHT: 160 LBS | OXYGEN SATURATION: 100 % | SYSTOLIC BLOOD PRESSURE: 119 MMHG | HEART RATE: 59 BPM | TEMPERATURE: 98.4 F | DIASTOLIC BLOOD PRESSURE: 56 MMHG | HEIGHT: 71 IN | RESPIRATION RATE: 18 BRPM

## 2020-11-19 PROCEDURE — 3700000001 HC ADD 15 MINUTES (ANESTHESIA): Performed by: INTERNAL MEDICINE

## 2020-11-19 PROCEDURE — 2580000003 HC RX 258: Performed by: INTERNAL MEDICINE

## 2020-11-19 PROCEDURE — 7100000011 HC PHASE II RECOVERY - ADDTL 15 MIN: Performed by: INTERNAL MEDICINE

## 2020-11-19 PROCEDURE — 45378 DIAGNOSTIC COLONOSCOPY: CPT | Performed by: INTERNAL MEDICINE

## 2020-11-19 PROCEDURE — 3700000000 HC ANESTHESIA ATTENDED CARE: Performed by: INTERNAL MEDICINE

## 2020-11-19 PROCEDURE — 6360000002 HC RX W HCPCS: Performed by: NURSE ANESTHETIST, CERTIFIED REGISTERED

## 2020-11-19 PROCEDURE — 2500000003 HC RX 250 WO HCPCS: Performed by: NURSE ANESTHETIST, CERTIFIED REGISTERED

## 2020-11-19 PROCEDURE — 3609027000 HC COLONOSCOPY: Performed by: INTERNAL MEDICINE

## 2020-11-19 PROCEDURE — 2709999900 HC NON-CHARGEABLE SUPPLY: Performed by: INTERNAL MEDICINE

## 2020-11-19 PROCEDURE — 7100000010 HC PHASE II RECOVERY - FIRST 15 MIN: Performed by: INTERNAL MEDICINE

## 2020-11-19 RX ORDER — SODIUM CHLORIDE, SODIUM LACTATE, POTASSIUM CHLORIDE, CALCIUM CHLORIDE 600; 310; 30; 20 MG/100ML; MG/100ML; MG/100ML; MG/100ML
INJECTION, SOLUTION INTRAVENOUS CONTINUOUS
Status: DISCONTINUED | OUTPATIENT
Start: 2020-11-19 | End: 2020-11-19 | Stop reason: HOSPADM

## 2020-11-19 RX ORDER — LIDOCAINE HYDROCHLORIDE 20 MG/ML
INJECTION, SOLUTION EPIDURAL; INFILTRATION; INTRACAUDAL; PERINEURAL PRN
Status: DISCONTINUED | OUTPATIENT
Start: 2020-11-19 | End: 2020-11-19 | Stop reason: SDUPTHER

## 2020-11-19 RX ORDER — PROPOFOL 10 MG/ML
INJECTION, EMULSION INTRAVENOUS PRN
Status: DISCONTINUED | OUTPATIENT
Start: 2020-11-19 | End: 2020-11-19 | Stop reason: SDUPTHER

## 2020-11-19 RX ADMIN — PROPOFOL 400 MG: 10 INJECTION, EMULSION INTRAVENOUS at 11:11

## 2020-11-19 RX ADMIN — LIDOCAINE HYDROCHLORIDE 2 ML: 20 INJECTION, SOLUTION EPIDURAL; INFILTRATION; INTRACAUDAL; PERINEURAL at 11:11

## 2020-11-19 RX ADMIN — SODIUM CHLORIDE, POTASSIUM CHLORIDE, SODIUM LACTATE AND CALCIUM CHLORIDE: 600; 310; 30; 20 INJECTION, SOLUTION INTRAVENOUS at 11:06

## 2020-11-19 ASSESSMENT — PAIN SCALES - GENERAL
PAINLEVEL_OUTOF10: 0

## 2020-11-19 ASSESSMENT — PAIN - FUNCTIONAL ASSESSMENT: PAIN_FUNCTIONAL_ASSESSMENT: 0-10

## 2020-11-19 NOTE — H&P
Patient Name: Clif Corona  : 1995  MRN: [id number]  DATE: 20    Allergies: Allergies   Allergen Reactions    Carrot Flavor [Flavoring Agent] Hives     Breaks out in hives        ENDOSCOPY  History and Physical    Procedure:    [x] Diagnostic Colonoscopy       [] Screening Colonoscopy  [] EGD      [] ERCP      [] EUS       [] Other    [x] Previous office notes/History and Physical reviewed from the patients chart. Please see EMR for further details of HPI. I have examined the patient's status immediately prior to the procedure and:      Indications/HPI:  1. Rectal bleeding  2. Lower abdominal pain  []Abdominal Pain   []Cancer- GI/Lung     []Fhx of colon CA/polyps  []History of Polyps  []Barretts            []Melena  []Abnormal Imaging              []Dysphagia              []Persistent Pneumonia   []Anemia                            []Food Impaction        []History of Polyps  [] GI Bleed             []Pulmonary nodule/Mass   []Change in bowel habits []Heartburn/Reflux  []Rectal Bleed (BRBPR)  []Chest Pain - Non Cardiac []Heme (+) Stool []Ulcers  []Constipation  []Hemoptysis  []Varices  []Diarrhea  []Hypoxemia    []Nausea/Vomiting   []Screening   []Crohns/Colitis  []Other:     Anesthesia:   [x] MAC [] Moderate Sedation   [] General   [] None     ROS: 12 pt Review of Symptoms was negative unless mentioned above    Medications:   Prior to Admission medications    Medication Sig Start Date End Date Taking?  Authorizing Provider   ondansetron (ZOFRAN ODT) 4 MG disintegrating tablet Take 1 tablet by mouth every 8 hours as needed for Nausea or Vomiting 20   HODAN Elilngton NP   sucralfate (CARAFATE) 1 GM tablet Take 1 tablet by mouth 4 times daily 20   HODAN Ellington NP   pantoprazole (PROTONIX) 40 MG tablet Take 1 tablet by mouth 2 times daily (before meals) 9/15/20   COBY Cedeño   tiZANidine (ZANAFLEX) 4 MG tablet Take 1 tablet by mouth every 8 hours as needed (back pain) 10/21/19   Craig Drivers, APRN - NP   ibuprofen (ADVIL;MOTRIN) 800 MG tablet Take 1 tablet by mouth every 8 hours as needed for Pain 10/21/19 9/15/20  Craig Drivers, APRN - NP       Past Medical History:  Past Medical History:   Diagnosis Date    Back pain     Diarrhea     Fracture, pelvis closed ([facility] [address] 01/03/2015    fell off roof    GERD (gastroesophageal reflux disease)     Nausea & vomiting     Rectal bleed     Rib fractures 01/03/2015    fell off roof    Stomach pain        Past Surgical History:  Past Surgical History:   Procedure Laterality Date    TONSILLECTOMY      UPPER GASTROINTESTINAL ENDOSCOPY N/A 10/8/2020    Dr Nathan Hansen History:  Social History     Tobacco Use    Smoking status: Heavy Tobacco Smoker     Packs/day: 1.00     Types: Cigarettes    Smokeless tobacco: Never Used   Substance Use Topics    Alcohol use: Not Currently     Comment: socially    Drug use: No       Vital Signs:   Vitals:    11/19/20 1045   BP: 125/70   Pulse: 61   Resp: 22   Temp: 98.6 °F (37 °C)   SpO2: 99%        Physical Exam:  Cardiac:  [x]WNL  []Comments:  Pulmonary:  [x]WNL   []Comments:  Neuro/Mental Status:  [x]WNL  []Comments:  Abdominal:  [x]WNL    []Comments:  Other:   []WNL  []Comments:    Informed Consent:  The risks and benefits of the procedure have been discussed with either the patient or if they cannot consent, their representative. Assessment:  Patient examined and appropriate for planned sedation and procedure. Plan:  Proceed with planned sedation and procedure as above.          Carline Saucedo MD

## 2020-11-19 NOTE — OP NOTE
Patient: Tamanna Bedoya : 1995  Med Rec#: [id number] Acc#: [id number]   Primary Care Provider No primary care provider on file. Date of Procedure:  2020    Endoscopist: Stalin Swann MD    Referring Provider: No primary care provider on file.,     Operation Performed: Colonoscopy up to the terminal ileum    Indications:   1. Rectal bleeding  2. Lower abdominal pain    Anesthesia:  Sedation was administered by anesthesia who monitored the patient during the procedure. I met with Tamanna Bedoya prior to procedure. We discussed the procedure itself, and I have discussed the risks of endoscopy (including-- but not limited to-- pain, discomfort, bleeding potentially requiring second endoscopic procedure and/or blood transfusion, organ perforation requiring operative repair, damage to organs near the colon, infection, aspiration, cardiopulmonary/allergic reaction), benefits, indications to endoscopy. Additionally, we discussed options other than colonoscopy. The patient expressed understanding. All questions answered. The patient decided to proceed with the procedure. Signed informed consent was placed on the chart. Blood Loss: minimal    Withdrawal time: >6 mins  Bowel Prep: adequate and good    Complications: no immediate complications    DESCRIPTION OF PROCEDURE:     A time out was performed. After written informed consent was obtained, the patient was placed in the left lateral position. The perianal area was inspected, and a digital rectal exam was performed. A rectal exam was performed: normal tone, no palpable lesions. At this point, a forward viewing Olympus colonoscope was inserted into the anus and carefully advanced to the terminal ileum. The cecum was identified by the ileocecal valve and the appendiceal orifice. The colonoscope was then slowly withdrawn with careful inspection of the mucosa in a linear and circumferential fashion.  The scope was retroflexed in the rectum. Suction was utilized during the procedure to remove as much air as possible from the bowel. The colonoscope was removed from the patient, and the procedure was terminated. Findings are listed below. Findings: The mucosa appeared normal throughout the entire examined colon . NO large polyps or masses or strictures or colitis or proctitis or ileitis or overt IBD. Internal hemorrhoids-Grade 1 without bleeding stigmata but likely source of his recent rectal bleeding. Where it was clearly visible, the mucosa appeared normal throughout the entire examined colon  Retroflexion in the rectum was otherwise normal and revealed no further abnormalities     Recommendations:  1. Repeat colonoscopy: between ages 44-55 yrs for CRCS depending on his personal and family hx as pertaining to his CRC risk when he turns 35 yrs old. 2. - Resume previous meds and diet; avoid constipation and straining at defecation. - GI clinic f/u 6-8 weeks with Ms. Zia Machuca scheduled f/u appts with other MDs     - NO ASA/NSAIDs x 2 weeks    Findings and recommendations were discussed w/ the patient. A copy of the images was provided.     Judith Winston MD  11/19/2020  11:05 AM

## 2020-11-19 NOTE — ANESTHESIA PRE PROCEDURE
Department of Anesthesiology  Preprocedure Note       Name:  Nicki Downs   Age:  31 y.o.  :  1995                                          MRN:  [id number]         Date:  2020      Surgeon: Aniyah Washburn):  Shelby Hackett MD    Procedure: Procedure(s):  COLONOSCOPY DIAGNOSTIC    Medications prior to admission:   Prior to Admission medications    Medication Sig Start Date End Date Taking? Authorizing Provider   ondansetron (ZOFRAN ODT) 4 MG disintegrating tablet Take 1 tablet by mouth every 8 hours as needed for Nausea or Vomiting 20   HODAN Yoder NP   sucralfate (CARAFATE) 1 GM tablet Take 1 tablet by mouth 4 times daily 20   HODAN Yoder NP   pantoprazole (PROTONIX) 40 MG tablet Take 1 tablet by mouth 2 times daily (before meals) 9/15/20   COBY Cheung   tiZANidine (ZANAFLEX) 4 MG tablet Take 1 tablet by mouth every 8 hours as needed (back pain) 10/21/19   HODAN Acevedo NP   ibuprofen (ADVIL;MOTRIN) 800 MG tablet Take 1 tablet by mouth every 8 hours as needed for Pain 10/21/19 9/15/20  HODAN Acevedo NP       Current medications:    Current Facility-Administered Medications   Medication Dose Route Frequency Provider Last Rate Last Dose    lactated ringers infusion   Intravenous Continuous Shelby Hackett MD           Allergies: Allergies   Allergen Reactions    Carrot Flavor [Flavoring Agent] Hives     Breaks out in hives       Problem List:  There is no problem list on file for this patient.       Past Medical History:        Diagnosis Date    Fracture, pelvis closed ([facility] [address] 2015    fell off roof    Rib fractures 2015    fell off roof       Past Surgical History:        Procedure Laterality Date    TONSILLECTOMY      UPPER GASTROINTESTINAL ENDOSCOPY N/A 10/8/2020    Dr Richardson Lopes History:    Social History     Tobacco Use    Smoking status: Heavy Tobacco Smoker     Packs/day: 1.00     Types: Cigarettes    Smokeless tobacco: Never Used   Substance Use Topics    Alcohol use: Not Currently     Comment: socially                                Ready to quit: Not Answered  Counseling given: Not Answered      Vital Signs (Current): There were no vitals filed for this visit. BP Readings from Last 3 Encounters:   10/08/20 124/64   10/08/20 (!) 114/57   09/18/20 (!) 151/81       NPO Status:                                                                                 BMI:   Wt Readings from Last 3 Encounters:   10/08/20 160 lb (72.6 kg)   09/18/20 165 lb (74.8 kg)   09/15/20 165 lb (74.8 kg)     There is no height or weight on file to calculate BMI.    CBC:   Lab Results   Component Value Date    WBC 13.8 09/18/2020    RBC 4.85 09/18/2020    HGB 15.2 09/18/2020    HCT 44.0 09/18/2020    MCV 90.7 09/18/2020    RDW 13.1 09/18/2020     09/18/2020       CMP:   Lab Results   Component Value Date     09/18/2020    K 3.8 09/18/2020    K 3.7 09/15/2020     09/18/2020    CO2 26 09/18/2020    BUN 8 09/18/2020    CREATININE 0.7 09/18/2020    GFRAA >59 09/18/2020    LABGLOM >60 09/18/2020    GLUCOSE 113 09/18/2020    PROT 6.9 09/18/2020    CALCIUM 9.3 09/18/2020    BILITOT 0.6 09/18/2020    ALKPHOS 69 09/18/2020    AST 13 09/18/2020    ALT 10 09/18/2020       POC Tests: No results for input(s): POCGLU, POCNA, POCK, POCCL, POCBUN, POCHEMO, POCHCT in the last 72 hours.     Coags: No results found for: PROTIME, INR, APTT    HCG (If Applicable): No results found for: PREGTESTUR, PREGSERUM, HCG, HCGQUANT     ABGs: No results found for: PHART, PO2ART, CBG1PHW, XCR8ADC, BEART, M3QRHGTE     Type & Screen (If Applicable):  No results found for: LABABO, LABRH    Drug/Infectious Status (If Applicable):  No results found for: HIV, HEPCAB    COVID-19 Screening (If Applicable):   Lab Results   Component Value Date    COVID19 Not Detected 11/14/2020         Anesthesia Evaluation  Patient summary reviewed and Nursing notes reviewed  Airway: Mallampati: I  TM distance: >3 FB   Neck ROM: full  Mouth opening: > = 3 FB Dental:          Pulmonary:Negative Pulmonary ROS                              Cardiovascular:Negative CV ROS             Beta Blocker:  Not on Beta Blocker         Neuro/Psych:   Negative Neuro/Psych ROS              GI/Hepatic/Renal: Neg GI/Hepatic/Renal ROS            Endo/Other: Negative Endo/Other ROS             Pt had no PAT visit       Abdominal:           Vascular: negative vascular ROS. Anesthesia Plan      general and TIVA     ASA 1       Induction: intravenous. Anesthetic plan and risks discussed with patient. Use of blood products discussed with patient whom.                    HODAN Galindo - CRNA   11/19/2020 Consent: Written consent obtained. Risks include but not limited to bruising, beading, irregular texture, ulceration, infection, allergic reaction, scar formation, incomplete augmentation, temporary nature, procedural pain.

## 2020-11-19 NOTE — ANESTHESIA POSTPROCEDURE EVALUATION
Department of Anesthesiology  Postprocedure Note    Patient: Tamanna Bedoya  MRN: [id number]  YOB: 1995  Date of evaluation: 11/19/2020  Time:  11:28 AM     Procedure Summary     Date:  11/19/20 Room / Location:  [address]    Anesthesia Start:  1109 Anesthesia Stop:      Procedure:  COLONOSCOPY DIAGNOSTIC (N/A Abdomen) Diagnosis:  (ABD PAIN, RECTAL BLEEDING)    Surgeon:  Stalin Swann MD Responsible Provider:  HODAN Carbone CRNA    Anesthesia Type:  general, TIVA ASA Status:  1          Anesthesia Type: general, TIVA    Jhonny Phase I: Jhonny Score: 10    Jhonny Phase II:      Last vitals: Reviewed and per EMR flowsheets.        Anesthesia Post Evaluation    Patient location during evaluation: bedside  Patient participation: complete - patient participated  Level of consciousness: awake  Pain score: 0  Airway patency: patent  Nausea & Vomiting: no nausea and no vomiting  Complications: no  Cardiovascular status: hemodynamically stable  Respiratory status: acceptable and spontaneous ventilation  Hydration status: euvolemic

## 2020-12-16 ENCOUNTER — VIRTUAL VISIT (OUTPATIENT)
Dept: GASTROENTEROLOGY | Age: 25
End: 2020-12-16
Payer: MEDICAID

## 2020-12-16 PROCEDURE — G8484 FLU IMMUNIZE NO ADMIN: HCPCS | Performed by: NURSE PRACTITIONER

## 2020-12-16 PROCEDURE — G8428 CUR MEDS NOT DOCUMENT: HCPCS | Performed by: NURSE PRACTITIONER

## 2020-12-16 PROCEDURE — G8420 CALC BMI NORM PARAMETERS: HCPCS | Performed by: NURSE PRACTITIONER

## 2020-12-16 PROCEDURE — 99213 OFFICE O/P EST LOW 20 MIN: CPT | Performed by: NURSE PRACTITIONER

## 2020-12-16 PROCEDURE — 4004F PT TOBACCO SCREEN RCVD TLK: CPT | Performed by: NURSE PRACTITIONER

## 2020-12-16 RX ORDER — ONDANSETRON 4 MG/1
4 TABLET, ORALLY DISINTEGRATING ORAL EVERY 12 HOURS PRN
Qty: 30 TABLET | Refills: 2 | Status: SHIPPED | OUTPATIENT
Start: 2020-12-16 | End: 2021-03-30 | Stop reason: SDUPTHER

## 2020-12-16 ASSESSMENT — ENCOUNTER SYMPTOMS
RECTAL PAIN: 0
ANAL BLEEDING: 0
ABDOMINAL DISTENTION: 0
TROUBLE SWALLOWING: 0
VOMITING: 0
NAUSEA: 1
ABDOMINAL PAIN: 0
DIARRHEA: 0
COUGH: 0
SHORTNESS OF BREATH: 0
BLOOD IN STOOL: 0
CONSTIPATION: 0
CHOKING: 0

## 2020-12-16 NOTE — PROGRESS NOTES
Packs/day: 1.00     Years: 7.00     Pack years: 7.00     Types: Cigarettes    Smokeless tobacco: Never Used    Tobacco comment: slowly working towards it per patient    Substance Use Topics    Alcohol use: Not Currently     Comment: socially    Drug use: No   ,   Family History   Problem Relation Age of Onset    Esophageal Cancer Other     Colon Cancer Neg Hx     Liver Cancer Neg Hx     Liver Disease Neg Hx     Rectal Cancer Neg Hx     Stomach Cancer Neg Hx        PHYSICAL EXAMINATION:  [ INSTRUCTIONS:  \"[x]\" Indicates a positive item  \"[]\" Indicates a negative item  -- DELETE ALL ITEMS NOT EXAMINED]  Vital Signs: (As obtained by patient/caregiver or practitioner observation)    Blood pressure-  Heart rate-    Respiratory rate-    Temperature-  Pulse oximetry-     Constitutional: [x] Appears well-developed and well-nourished [x] No apparent distress      [] Abnormal-   Mental status  [x] Alert and awake  [x] Oriented to person/place/time [x]Able to follow commands      Eyes:  EOM    [x]  Normal  [] Abnormal-  Sclera  [x]  Normal  [] Abnormal -         Discharge [x]  None visible  [] Abnormal -    HENT:   [x] Normocephalic, atraumatic.   [] Abnormal   [x] Mouth/Throat: Mucous membranes are moist.     External Ears [x] Normal  [] Abnormal-     Neck: [x] No visualized mass     Pulmonary/Chest: [x] Respiratory effort normal.  [x] No visualized signs of difficulty breathing or respiratory distress        [] Abnormal-      Musculoskeletal:   [x] Normal gait with no signs of ataxia         [x] Normal range of motion of neck        [] Abnormal-       Neurological:        [x] No Facial Asymmetry (Cranial nerve 7 motor function) (limited exam to video visit)          [x] No gaze palsy        [] Abnormal-         Skin:        [x] No significant exanthematous lesions or discoloration noted on facial skin         [] Abnormal-            Psychiatric:       [x] Normal Affect [x] No Hallucinations        [] Abnormal- Other pertinent observable physical exam findings-     ASSESSMENT/PLAN:  1. Cyclical vomiting with nausea  2. Dyspepsia    - Continue PPI  - Continue Zofran prn, refills provided  - Follow up in one year or sooner if needed  - Call with any questions, concerns, or worsening of symptoms      Return in about 1 year (around 12/16/2021). Jersey Pugh is a 31 y.o. male being evaluated by a Virtual Visit (video visit) encounter to address concerns as mentioned above. A caregiver was present when appropriate. Due to this being a TeleHealth encounter (During EXTWR-22 public health emergency), evaluation of the following organ systems was limited: Vitals/Constitutional/EENT/Resp/CV/GI//MS/Neuro/Skin/Heme-Lymph-Imm. Pursuant to the emergency declaration under the [address] [place] [address], [address] [place] [address] authority and the [employer] Act, this Virtual Visit was conducted with patient's (and/or legal guardian's) consent, to reduce the patient's risk of exposure to COVID-19 and provide necessary medical care. The patient (and/or legal guardian) has also been advised to contact this office for worsening conditions or problems, and seek emergency medical treatment and/or call 911 if deemed necessary. Patient identification was verified at the start of the visit: Yes    Total time spent on this encounter: Not billed by time    Services were provided through a video synchronous discussion virtually to substitute for in-person clinic visit. Patient and provider were located at their individual homes. --HODAN Bearden - NP on 12/16/2020 at 10:05 AM    An electronic signature was used to authenticate this note.

## 2020-12-16 NOTE — PATIENT INSTRUCTIONS
Patient Education        Gastroesophageal Reflux Disease (GERD): Care Instructions  Overview     Gastroesophageal reflux disease (GERD) is the backward flow of stomach acid into the esophagus. The esophagus is the tube that leads from your throat to your stomach. A one-way valve prevents the stomach acid from backing up into this tube. But when you have GERD, this valve does not close tightly enough. This can also cause pain and swelling in your esophagus. (This is called esophagitis.)  If you have mild GERD symptoms including heartburn, you may be able to control the problem with antacids or over-the-counter medicine. You can also make lifestyle changes to help reduce your symptoms. These include changing your diet and eating habits, such as not eating late at night and losing weight. Follow-up care is a key part of your treatment and safety. Be sure to make and go to all appointments, and call your doctor if you are having problems. It's also a good idea to know your test results and keep a list of the medicines you take. How can you care for yourself at home? · Take your medicines exactly as prescribed. Call your doctor if you think you are having a problem with your medicine. · Your doctor may recommend over-the-counter medicine. For mild or occasional indigestion, antacids, such as Tums, Gaviscon, Mylanta, or Maalox, may help. Your doctor also may recommend over-the-counter acid reducers, such as famotidine (Pepcid AC), cimetidine (Tagamet HB), or omeprazole (Prilosec). Read and follow all instructions on the label. If you use these medicines often, talk with your doctor. · Change your eating habits. ? It's best to eat several small meals instead of two or three large meals. ? After you eat, wait 2 to 3 hours before you lie down. ? Chocolate, mint, and alcohol can make GERD worse. ? Spicy foods, foods that have a lot of acid (like tomatoes and oranges), and coffee can make GERD symptoms worse in some people. If your symptoms are worse after you eat a certain food, you may want to stop eating that food to see if your symptoms get better. · Do not smoke or chew tobacco. Smoking can make GERD worse. If you need help quitting, talk to your doctor about stop-smoking programs and medicines. These can increase your chances of quitting for good. · If you have GERD symptoms at night, raise the head of your bed 6 to 8 inches by putting the frame on blocks or placing a foam wedge under the head of your mattress. (Adding extra pillows does not work.)  · Do not wear tight clothing around your middle. · Lose weight if you need to. Losing just 5 to 10 pounds can help. When should you call for help? Call your doctor now or seek immediate medical care if:    · You have new or different belly pain.     · Your stools are black and tarlike or have streaks of blood. Watch closely for changes in your health, and be sure to contact your doctor if:    · Your symptoms have not improved after 2 days.     · Food seems to catch in your throat or chest.   Where can you learn more? Go to https://[employer]. org and sign in to your [employer] account. Enter J595 in the [facility] box to learn more about \"Gastroesophageal Reflux Disease (GERD): Care Instructions. \"     If you do not have an account, please click on the \"Sign Up Now\" link. Current as of: April 15, 2020               Content Version: 12.6  © 0262-7626 [employer]. Care instructions adapted under license by [facility] ([facility]). If you have questions about a medical condition or this instruction, always ask your healthcare professional. [address] any warranty or liability for your use of this information.

## 2021-02-03 ENCOUNTER — LAB (OUTPATIENT)
Dept: LAB | Facility: HOSPITAL | Age: 26
End: 2021-02-03

## 2021-02-03 ENCOUNTER — TRANSCRIBE ORDERS (OUTPATIENT)
Dept: LAB | Facility: HOSPITAL | Age: 26
End: 2021-02-03

## 2021-02-03 DIAGNOSIS — N46.9 INFERTILITY MALE: ICD-10-CM

## 2021-02-03 DIAGNOSIS — N46.9 INFERTILITY MALE: Primary | ICD-10-CM

## 2021-02-03 LAB
CHARACTER SMN: NORMAL
COLLECTION METHOD SMN: ABNORMAL
EPI CELLS #/AREA SMN HPF: ABNORMAL /HPF
LIQUEFACTION TIME SMN: NORMAL MIN
Lab: ABNORMAL
NON PROGRESSIVE MOTILITY: ABNORMAL
RBC #/AREA SMN HPF: ABNORMAL /HPF
SEX ABSTIN DURATION TIME PATIENT: 4 DAYS
SLOW PROGRESSIVE MOTILITY: ABNORMAL
SMI: 0 (ref 80–400)
SPEC CONTAINER SPEC: ABNORMAL
SPECIMEN VOL SMN: 2.3 ML
SPERM # SMN: <2 MILLIONS/ML
SPERM IMMOTILE NFR SMN: ABNORMAL %
SPERM MOTILE NFR SMN: 0 % MOTILE (ref 50–100)
SPERM PROG NFR SMN: ABNORMAL %
SPERM SMN: ABNORMAL
VISC SMN: NORMAL CP
WBC SMN QL: ABNORMAL /HPF
WHO STANDARD: ABNORMAL

## 2021-02-03 PROCEDURE — 89320 SEMEN ANAL VOL/COUNT/MOT: CPT

## 2021-02-26 ENCOUNTER — OFFICE VISIT (OUTPATIENT)
Dept: FAMILY MEDICINE CLINIC | Facility: CLINIC | Age: 26
End: 2021-02-26

## 2021-02-26 VITALS
HEART RATE: 98 BPM | TEMPERATURE: 97 F | BODY MASS INDEX: 26.34 KG/M2 | DIASTOLIC BLOOD PRESSURE: 76 MMHG | RESPIRATION RATE: 16 BRPM | HEIGHT: 70 IN | SYSTOLIC BLOOD PRESSURE: 145 MMHG | WEIGHT: 184 LBS | OXYGEN SATURATION: 98 %

## 2021-02-26 DIAGNOSIS — N50.812 TESTICULAR PAIN, LEFT: ICD-10-CM

## 2021-02-26 DIAGNOSIS — N46.01 AZOOSPERMIA: Primary | ICD-10-CM

## 2021-02-26 PROCEDURE — 99204 OFFICE O/P NEW MOD 45 MIN: CPT | Performed by: FAMILY MEDICINE

## 2021-02-26 RX ORDER — SUCRALFATE 1 G/1
1 TABLET ORAL
COMMUNITY
Start: 2020-09-23

## 2021-02-26 RX ORDER — ONDANSETRON 4 MG/1
4 TABLET, ORALLY DISINTEGRATING ORAL EVERY 12 HOURS PRN
COMMUNITY
Start: 2021-01-26 | End: 2021-03-09 | Stop reason: SDUPTHER

## 2021-02-26 RX ORDER — PANTOPRAZOLE SODIUM 40 MG/1
40 TABLET, DELAYED RELEASE ORAL DAILY
COMMUNITY
Start: 2020-12-11 | End: 2021-06-22

## 2021-02-28 NOTE — PROGRESS NOTES
"Chief Complaint  Establish Care, Male Factor Infertility (needing referral to specialist, had recent sperm analysis), and Rash (on elbows)    Subjective          Tj Benedict presents to [facility]  History of Present Illness  Here to establish care requesting further assessment of infertility.  His wife has had a history of infertility and requires progesterone therapy, she had 3 miscarriages prior to her relationship with him, they have conceived once but she miscarried, he reports unknowingly needing progesterone for stabilization of pregnancy.  He had a recent semen analysis with azoospermia, and is requesting seeing a fertility specialist.    Medical history includes fall from 40 feet with multiple rib fractures as well as pelvic fracture, he also complains of several months of intermittent left-sided mass near the left testicle that comes and goes without pain.    When the patient sweats he will have occasional clear bumps on his elbows, otherwise review of systems negative     Objective   Vital Signs:   /76 (BP Location: Left arm, Patient Position: Sitting, Cuff Size: Adult)   Pulse 98   Temp 97 °F (36.1 °C) (Temporal)   Resp 16   Ht 177.8 cm (70\")   Wt 83.5 kg (184 lb)   SpO2 98%   BMI 26.40 kg/m²     Physical Exam  Vitals signs and nursing note reviewed.   Constitutional:       General: He is not in acute distress.     Appearance: He is not diaphoretic.   HENT:      Head: Normocephalic and atraumatic.      Nose: Nose normal.   Eyes:      General: No scleral icterus.        Right eye: No discharge.         Left eye: No discharge.      Conjunctiva/sclera: Conjunctivae normal.   Neck:      Trachea: No tracheal deviation.   Cardiovascular:      Rate and Rhythm: Normal rate and regular rhythm.      Heart sounds: Normal heart sounds. No murmur. No friction rub. No gallop.    Pulmonary:      Effort: Pulmonary effort is normal. No respiratory distress.      Breath " sounds: Normal breath sounds. No wheezing or rales.   Genitourinary:     Penis: Normal.       Scrotum/Testes: Normal.   Skin:     General: Skin is warm and dry.      Coloration: Skin is not pale.   Neurological:      Mental Status: He is alert and oriented to person, place, and time.   Psychiatric:         Behavior: Behavior normal.         Thought Content: Thought content normal.         Judgment: Judgment normal.        Result Review :                 Assessment and Plan    Diagnoses and all orders for this visit:    1. Azoospermia (Primary)  -     US Scrotum & Testicles; Future  -     Testosterone, Free, Total; Future  -     TSH; Future  -     FSH & LH; Future  -     Prolactin; Future  -     Chromosome Analysis, Peripheral Blood; Future  -     Semen Analysis - Semen, Voided; Future    2. Testicular pain, left  -     US Scrotum & Testicles; Future    Ultrasound and labs for further assessment, will reorder semen analysis after 3 days of abstinence, and based on results refer to fertility specialist.

## 2021-03-02 ENCOUNTER — TELEPHONE (OUTPATIENT)
Dept: FAMILY MEDICINE CLINIC | Facility: CLINIC | Age: 26
End: 2021-03-02

## 2021-03-02 NOTE — TELEPHONE ENCOUNTER
----- Message from Masoud Quarles, DO sent at 2/28/2021  3:21 PM CST -----  Please let patient know that after speaking with OB/GYN I would like him to complete another semen analysis which has been ordered, he can get supplies from the lab.  This should be completed after 3 days of abstinence.  Based on that, lab results and ultrasound we can proceed with potential referral.  Please let him know that fertility specialist will likely not be covered by insurance

## 2021-03-04 ENCOUNTER — LAB (OUTPATIENT)
Dept: LAB | Facility: HOSPITAL | Age: 26
End: 2021-03-04

## 2021-03-04 DIAGNOSIS — N46.01 AZOOSPERMIA: ICD-10-CM

## 2021-03-04 LAB
CHARACTER SMN: NORMAL
COLLECTION METHOD SMN: ABNORMAL
EPI CELLS #/AREA SMN HPF: ABNORMAL /HPF
FSH SERPL-ACNC: 19.9 MIU/ML
LH SERPL-ACNC: 5 MIU/ML
LIQUEFACTION TIME SMN: NORMAL MIN
Lab: ABNORMAL
NON PROGRESSIVE MOTILITY: 0 %
PROLACTIN SERPL-MCNC: 21.6 NG/ML (ref 4.04–15.2)
RBC #/AREA SMN HPF: ABNORMAL /HPF
SEX ABSTIN DURATION TIME PATIENT: 3 DAYS
SLOW PROGRESSIVE MOTILITY: 0 %
SMI: 0 (ref 80–400)
SPEC CONTAINER SPEC: ABNORMAL
SPECIMEN VOL SMN: 1.8 ML
SPERM # SMN: 0 MILLIONS/ML
SPERM IMMOTILE NFR SMN: 0 %
SPERM SMN: 0 % NORMAL (ref 15–100)
TSH SERPL DL<=0.05 MIU/L-ACNC: 1.78 UIU/ML (ref 0.27–4.2)
VISC SMN: NORMAL CP
WBC SMN QL: ABNORMAL /HPF
WHO STANDARD: ABNORMAL

## 2021-03-04 PROCEDURE — 84402 ASSAY OF FREE TESTOSTERONE: CPT

## 2021-03-04 PROCEDURE — 84403 ASSAY OF TOTAL TESTOSTERONE: CPT

## 2021-03-04 PROCEDURE — 89320 SEMEN ANAL VOL/COUNT/MOT: CPT

## 2021-03-04 PROCEDURE — 84443 ASSAY THYROID STIM HORMONE: CPT

## 2021-03-04 PROCEDURE — 84146 ASSAY OF PROLACTIN: CPT

## 2021-03-04 PROCEDURE — 88262 CHROMOSOME ANALYSIS 15-20: CPT

## 2021-03-04 PROCEDURE — 88230 TISSUE CULTURE LYMPHOCYTE: CPT

## 2021-03-04 PROCEDURE — 83002 ASSAY OF GONADOTROPIN (LH): CPT

## 2021-03-04 PROCEDURE — 36415 COLL VENOUS BLD VENIPUNCTURE: CPT

## 2021-03-04 PROCEDURE — 83001 ASSAY OF GONADOTROPIN (FSH): CPT

## 2021-03-07 LAB
TESTOST FREE SERPL-MCNC: 6.8 PG/ML (ref 9.3–26.5)
TESTOST SERPL-MCNC: 327 NG/DL (ref 264–916)

## 2021-03-08 ENCOUNTER — HOSPITAL ENCOUNTER (OUTPATIENT)
Dept: ULTRASOUND IMAGING | Facility: HOSPITAL | Age: 26
Discharge: HOME | End: 2021-03-08
Admitting: FAMILY MEDICINE

## 2021-03-08 DIAGNOSIS — N46.01 AZOOSPERMIA: ICD-10-CM

## 2021-03-08 DIAGNOSIS — N50.812 TESTICULAR PAIN, LEFT: ICD-10-CM

## 2021-03-08 PROCEDURE — 76870 US EXAM SCROTUM: CPT

## 2021-03-09 ENCOUNTER — TELEPHONE (OUTPATIENT)
Dept: FAMILY MEDICINE CLINIC | Facility: CLINIC | Age: 26
End: 2021-03-09

## 2021-03-09 DIAGNOSIS — R11.0 NAUSEA: Primary | ICD-10-CM

## 2021-03-09 DIAGNOSIS — E22.1 HYPERPROLACTINEMIA (HCC): ICD-10-CM

## 2021-03-09 RX ORDER — ONDANSETRON 4 MG/1
4 TABLET, ORALLY DISINTEGRATING ORAL EVERY 12 HOURS PRN
Qty: 30 TABLET | Refills: 2 | Status: SHIPPED | OUTPATIENT
Start: 2021-03-09 | End: 2021-06-24 | Stop reason: SDUPTHER

## 2021-03-09 NOTE — TELEPHONE ENCOUNTER
Caller: Tj Benedict    Relationship: Self    Best call back number: [phone]    Caller requesting test results: SELF    What test was performed: ULTRASOUND, BLOOD WORK    Where was the test performed: [facility]    Additional notes: PATIENT WOULD LIKE TO SPEAK WITH DR GOOD ABOUT HIS RESULTS

## 2021-03-17 LAB
CELLS ANALYZED: 20
CELLS COUNTED: 20
CELLS KARYOTYPED.TOTAL BLD/T: 2
CLINICAL CYTOGENETICIST SPEC: NORMAL
DIAGNOSTIC IMP SPEC-IMP: NORMAL
ISCN BAND LEVEL QL: 500
KARYOTYP BLD/T: NORMAL
SPECIMEN SOURCE: NORMAL

## 2021-03-18 DIAGNOSIS — E22.1 HYPERPROLACTINEMIA (HCC): ICD-10-CM

## 2021-03-29 ENCOUNTER — TELEPHONE (OUTPATIENT)
Dept: FAMILY MEDICINE CLINIC | Facility: CLINIC | Age: 26
End: 2021-03-29

## 2021-03-29 NOTE — TELEPHONE ENCOUNTER
PT CALLED REQUESTING A CALLBACK FROM DR. GOOD REGARDING HIS MRI SCHEDULED FOR TOMORROW AND AN ISSUE WITH HIS INSURANCE COVERAGE    CALLBACK [phone]

## 2021-03-29 NOTE — TELEPHONE ENCOUNTER
Prior authorization submitted through cover my meds for pantoprazole DR 40mg #60.  Prior authorization partially approved. Patient's insurance will only cover 30 per 30 days.   Patient and pharmacy informed, voiced understanding

## 2021-03-30 ENCOUNTER — APPOINTMENT (OUTPATIENT)
Dept: MRI IMAGING | Facility: HOSPITAL | Age: 26
End: 2021-03-30

## 2021-03-30 ENCOUNTER — HOSPITAL ENCOUNTER (OUTPATIENT)
Dept: MRI IMAGING | Facility: HOSPITAL | Age: 26
Discharge: HOME | End: 2021-03-30
Admitting: FAMILY MEDICINE

## 2021-03-30 DIAGNOSIS — E22.1 HYPERPROLACTINEMIA (HCC): ICD-10-CM

## 2021-03-30 LAB — CREAT BLDA-MCNC: 0.8 MG/DL (ref 0.6–1.3)

## 2021-03-30 PROCEDURE — A9577 INJ MULTIHANCE: HCPCS | Performed by: FAMILY MEDICINE

## 2021-03-30 PROCEDURE — 82565 ASSAY OF CREATININE: CPT

## 2021-03-30 PROCEDURE — 0 GADOBENATE DIMEGLUMINE 529 MG/ML SOLUTION: Performed by: FAMILY MEDICINE

## 2021-03-30 PROCEDURE — 70553 MRI BRAIN STEM W/O & W/DYE: CPT

## 2021-03-30 RX ORDER — ONDANSETRON 4 MG/1
4 TABLET, ORALLY DISINTEGRATING ORAL EVERY 12 HOURS PRN
Qty: 30 TABLET | Refills: 1 | Status: SHIPPED | OUTPATIENT
Start: 2021-03-30

## 2021-03-30 RX ORDER — PANTOPRAZOLE SODIUM 40 MG/1
40 TABLET, DELAYED RELEASE ORAL
Qty: 180 TABLET | Refills: 1 | Status: SHIPPED | OUTPATIENT
Start: 2021-03-30

## 2021-03-30 RX ADMIN — GADOBENATE DIMEGLUMINE 17 ML: 529 INJECTION, SOLUTION INTRAVENOUS at 15:23

## 2021-03-31 ENCOUNTER — PATIENT MESSAGE (OUTPATIENT)
Dept: FAMILY MEDICINE CLINIC | Facility: CLINIC | Age: 26
End: 2021-03-31

## 2021-04-02 NOTE — TELEPHONE ENCOUNTER
From: Tj Benedict  To: Masoud Quarles DO  Sent: 3/31/2021 12:51 PM CDT  Subject: Test Results Question    Was wondering if the results of my mri are in yet.

## 2021-04-06 ENCOUNTER — OFFICE VISIT (OUTPATIENT)
Dept: FAMILY MEDICINE CLINIC | Facility: CLINIC | Age: 26
End: 2021-04-06

## 2021-04-06 VITALS
OXYGEN SATURATION: 100 % | DIASTOLIC BLOOD PRESSURE: 82 MMHG | SYSTOLIC BLOOD PRESSURE: 134 MMHG | BODY MASS INDEX: 27.8 KG/M2 | WEIGHT: 194.2 LBS | HEIGHT: 70 IN | HEART RATE: 82 BPM | TEMPERATURE: 97.9 F

## 2021-04-06 DIAGNOSIS — R79.89 LOW TESTOSTERONE: ICD-10-CM

## 2021-04-06 DIAGNOSIS — E34.9 ABNORMAL FSH LEVEL: ICD-10-CM

## 2021-04-06 DIAGNOSIS — N46.01 AZOOSPERMIA: Primary | ICD-10-CM

## 2021-04-06 DIAGNOSIS — E22.1 HYPERPROLACTINEMIA (HCC): ICD-10-CM

## 2021-04-06 PROCEDURE — 99214 OFFICE O/P EST MOD 30 MIN: CPT | Performed by: FAMILY MEDICINE

## 2021-04-17 NOTE — PROGRESS NOTES
"Chief Complaint  Follow-up (Discuss MRI and next step)    Subjective          Tj Benedict presents to [facility]  History of Present Illness  Here for follow-up of azoospermia and mild hyperprolactinemia, discussed recent MRI which was negative for pituitary pathology.  Chromosome analysis within normal limits, at this point his work-up is consistent for elevated prolactin of 21.6, normal TSH, LH of 5.0 with elevated FSH of 19.9.  Most recent testosterone with low total of 327, low free testosterone of 6.8.    Objective   Vital Signs:   /82 (BP Location: Left arm, Patient Position: Sitting, Cuff Size: Adult)   Pulse 82   Temp 97.9 °F (36.6 °C) (Temporal)   Ht 177.8 cm (70\")   Wt 88.1 kg (194 lb 3.2 oz)   SpO2 100%   BMI 27.86 kg/m²     Physical Exam  Vitals and nursing note reviewed.   Constitutional:       General: He is not in acute distress.     Appearance: He is not diaphoretic.   HENT:      Head: Normocephalic and atraumatic.      Nose: Nose normal.   Eyes:      General: No scleral icterus.        Right eye: No discharge.         Left eye: No discharge.      Conjunctiva/sclera: Conjunctivae normal.   Neck:      Trachea: No tracheal deviation.   Pulmonary:      Effort: Pulmonary effort is normal.   Skin:     General: Skin is warm and dry.      Coloration: Skin is not pale.   Neurological:      Mental Status: He is alert and oriented to person, place, and time.   Psychiatric:         Mood and Affect: Mood is anxious.         Behavior: Behavior normal.         Thought Content: Thought content normal.         Judgment: Judgment normal.        Result Review :                 Assessment and Plan    Diagnoses and all orders for this visit:    1. Azoospermia (Primary)  -     Testosterone Cypionate 150 MG/ML solution; Inject 150 mg as directed Every 14 (Fourteen) Days.  Dispense: 5 mL; Refill: 2    2. Hyperprolactinemia (CMS/HCC)    3. Low testosterone  -     Testosterone " Cypionate 150 MG/ML solution; Inject 150 mg as directed Every 14 (Fourteen) Days.  Dispense: 5 mL; Refill: 2    4. Abnormal FSH level    start testosterone supplementation  F/u 8 weeks, repeat labs in 4-6 weeks

## 2021-04-29 DIAGNOSIS — N46.01 AZOOSPERMIA: Primary | ICD-10-CM

## 2021-04-30 ENCOUNTER — TELEPHONE (OUTPATIENT)
Dept: FAMILY MEDICINE CLINIC | Facility: CLINIC | Age: 26
End: 2021-04-30

## 2021-04-30 DIAGNOSIS — N46.01 AZOOSPERMIA: Primary | ICD-10-CM

## 2021-04-30 DIAGNOSIS — R79.89 LOW TESTOSTERONE: ICD-10-CM

## 2021-04-30 RX ORDER — TESTOSTERONE CYPIONATE 200 MG/ML
150 INJECTION, SOLUTION INTRAMUSCULAR
Qty: 1.5 ML | Refills: 0 | Status: SHIPPED | OUTPATIENT
Start: 2021-04-30 | End: 2021-05-27

## 2021-04-30 NOTE — TELEPHONE ENCOUNTER
Pharmacy contacted, new Rx to be sent for different vial size since the pharmacy cannot get the 150mg in stock.  Patient informed via my chart message

## 2021-04-30 NOTE — TELEPHONE ENCOUNTER
Caller: Tj Benedict    Relationship to patient: Self    Best call back number: [phone]    Patient is needing:     Patient has some questions about how to take his testosterone prescription. He is requesting a call back with clarification.

## 2021-05-26 DIAGNOSIS — N46.01 AZOOSPERMIA: ICD-10-CM

## 2021-05-26 DIAGNOSIS — R79.89 LOW TESTOSTERONE: ICD-10-CM

## 2021-05-27 RX ORDER — TESTOSTERONE CYPIONATE 200 MG/ML
INJECTION, SOLUTION INTRAMUSCULAR
Qty: 2 ML | Refills: 0 | Status: SHIPPED | OUTPATIENT
Start: 2021-05-27 | End: 2021-06-11 | Stop reason: SDUPTHER

## 2021-06-09 ENCOUNTER — TELEPHONE (OUTPATIENT)
Dept: FAMILY MEDICINE CLINIC | Facility: CLINIC | Age: 26
End: 2021-06-09

## 2021-06-11 ENCOUNTER — LAB (OUTPATIENT)
Dept: LAB | Facility: HOSPITAL | Age: 26
End: 2021-06-11

## 2021-06-11 DIAGNOSIS — N46.01 AZOOSPERMIA: ICD-10-CM

## 2021-06-11 DIAGNOSIS — R79.89 LOW TESTOSTERONE: ICD-10-CM

## 2021-06-11 LAB
CHARACTER SMN: NORMAL
COLLECTION METHOD SMN: NORMAL
EPI CELLS #/AREA SMN HPF: NORMAL /HPF
LIQUEFACTION TIME SMN: NORMAL MIN
NON PROGRESSIVE MOTILITY: NORMAL
RBC #/AREA SMN HPF: NORMAL /HPF
SEX ABSTIN DURATION TIME PATIENT: 4 DAYS
SLOW PROGRESSIVE MOTILITY: NORMAL
SMI: NORMAL
SPEC CONTAINER SPEC: NORMAL
SPECIMEN VOL SMN: 2 ML
SPERM # SMN: NORMAL 10*3/UL
SPERM IMMOTILE NFR SMN: NORMAL %
SPERM MOTILE NFR SMN: NORMAL %
SPERM PROG NFR SMN: NORMAL %
SPERM SMN: NORMAL
VISC SMN: NORMAL CP
WBC SMN QL: NORMAL /HPF
WHO STANDARD: NORMAL

## 2021-06-11 PROCEDURE — 89320 SEMEN ANAL VOL/COUNT/MOT: CPT

## 2021-06-11 RX ORDER — TESTOSTERONE CYPIONATE 200 MG/ML
150 INJECTION, SOLUTION INTRAMUSCULAR
Qty: 10 ML | Refills: 0 | Status: SHIPPED | OUTPATIENT
Start: 2021-06-11 | End: 2021-06-24 | Stop reason: SDUPTHER

## 2021-06-12 ENCOUNTER — PATIENT MESSAGE (OUTPATIENT)
Dept: FAMILY MEDICINE CLINIC | Facility: CLINIC | Age: 26
End: 2021-06-12

## 2021-06-12 DIAGNOSIS — R79.89 LOW TESTOSTERONE: ICD-10-CM

## 2021-06-12 DIAGNOSIS — N46.01 AZOOSPERMIA: ICD-10-CM

## 2021-06-15 NOTE — TELEPHONE ENCOUNTER
From: Tj Benedict  To: Masoud Quarles DO  Sent: 6/12/2021 2:52 PM CDT  Subject: Test Results Question    So that means my sperm quality got better with the shots right??

## 2021-06-20 ENCOUNTER — PATIENT MESSAGE (OUTPATIENT)
Dept: FAMILY MEDICINE CLINIC | Facility: CLINIC | Age: 26
End: 2021-06-20

## 2021-06-20 DIAGNOSIS — R11.0 NAUSEA: ICD-10-CM

## 2021-06-22 RX ORDER — PANTOPRAZOLE SODIUM 40 MG/1
TABLET, DELAYED RELEASE ORAL
Qty: 90 TABLET | Refills: 0 | Status: SHIPPED | OUTPATIENT
Start: 2021-06-22 | End: 2022-03-07 | Stop reason: SDUPTHER

## 2021-06-24 RX ORDER — ONDANSETRON 4 MG/1
4 TABLET, ORALLY DISINTEGRATING ORAL EVERY 12 HOURS PRN
Qty: 30 TABLET | Refills: 2 | Status: SHIPPED | OUTPATIENT
Start: 2021-06-24 | End: 2021-10-10 | Stop reason: SDUPTHER

## 2021-06-24 RX ORDER — TESTOSTERONE CYPIONATE 200 MG/ML
150 INJECTION, SOLUTION INTRAMUSCULAR
Qty: 10 ML | Refills: 0 | Status: SHIPPED | OUTPATIENT
Start: 2021-06-24 | End: 2021-12-28

## 2021-06-24 NOTE — TELEPHONE ENCOUNTER
From: Tj Benedict  To: Masoud Quarles DO  Sent: 6/20/2021 6:26 PM CDT  Subject: Prescription Question    I'm out of my nausea medicine and since my medicine has been reduced I stay bloated and feeling sick. I'm having to use over the counter omeprezole (however you spell it) and nausea stuff and that's barely keeping me from getting sick. I've been miserable and having to miss work because every time I get hot I start to get sick. Wondering what we can do about that. Thank you.

## 2021-07-08 ENCOUNTER — APPOINTMENT (OUTPATIENT)
Dept: GENERAL RADIOLOGY | Age: 26
End: 2021-07-08
Payer: MEDICAID

## 2021-07-08 ENCOUNTER — HOSPITAL ENCOUNTER (EMERGENCY)
Age: 26
Discharge: HOME | End: 2021-07-08
Payer: MEDICAID

## 2021-07-08 VITALS
DIASTOLIC BLOOD PRESSURE: 79 MMHG | SYSTOLIC BLOOD PRESSURE: 146 MMHG | BODY MASS INDEX: 26.6 KG/M2 | WEIGHT: 190 LBS | TEMPERATURE: 98.3 F | RESPIRATION RATE: 18 BRPM | HEIGHT: 71 IN | HEART RATE: 79 BPM | OXYGEN SATURATION: 99 %

## 2021-07-08 DIAGNOSIS — S39.012A STRAIN OF LUMBAR REGION, INITIAL ENCOUNTER: Primary | ICD-10-CM

## 2021-07-08 PROCEDURE — 99281 EMR DPT VST MAYX REQ PHY/QHP: CPT

## 2021-07-08 PROCEDURE — 6360000002 HC RX W HCPCS: Performed by: NURSE PRACTITIONER

## 2021-07-08 PROCEDURE — 72170 X-RAY EXAM OF PELVIS: CPT

## 2021-07-08 PROCEDURE — 96372 THER/PROPH/DIAG INJ SC/IM: CPT

## 2021-07-08 PROCEDURE — 99282 EMERGENCY DEPT VISIT SF MDM: CPT

## 2021-07-08 RX ORDER — KETOROLAC TROMETHAMINE 30 MG/ML
15 INJECTION, SOLUTION INTRAMUSCULAR; INTRAVENOUS ONCE
Status: COMPLETED | OUTPATIENT
Start: 2021-07-08 | End: 2021-07-08

## 2021-07-08 RX ADMIN — KETOROLAC TROMETHAMINE 15 MG: 30 INJECTION, SOLUTION INTRAMUSCULAR at 12:50

## 2021-07-08 ASSESSMENT — ENCOUNTER SYMPTOMS
BACK PAIN: 1
VOMITING: 0
DIARRHEA: 0
NAUSEA: 0
SHORTNESS OF BREATH: 0
CHEST TIGHTNESS: 0
ABDOMINAL PAIN: 0

## 2021-07-08 ASSESSMENT — PAIN SCALES - GENERAL: PAINLEVEL_OUTOF10: 10

## 2021-07-08 NOTE — ED PROVIDER NOTES
[facility] EMERGENCY DEPT  EMERGENCY DEPARTMENT ENCOUNTER      Pt Name: Sachi Dick  MRN: [id number]  Ernestinagfeileen 1995  Date of evaluation: 7/8/2021  Provider: HODAN Alexander 2525       Chief Complaint   Patient presents with    Back Pain     left lower back, no known injury         HISTORY OF PRESENT ILLNESS   (Location/Symptom, Timing/Onset, Context/Setting, Quality, Duration, Modifying Factors, Severity)  Note limiting factors. Sachi Dick is a 31 y.o. male with concern for pain to pelvis. It is located on his back, just above his butt he states. States he fractured it years ago when he fell of a roof. States he lives with pain but that it is acutely worse this week. 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 No new trauma or injury. Nothing makes better or worse. No home treatment. No bowel or bladder dysfunction. No saddle anesthesia. No numbness, weakness, tingling. HPI    Nursing Notes were reviewed. REVIEW OF SYSTEMS    (2-9 systems for level 4, 10 or more for level 5)     Review of Systems   Constitutional: Negative for chills and fever. Respiratory: Negative for chest tightness and shortness of breath. Cardiovascular: Negative for chest pain and palpitations. Gastrointestinal: Negative for abdominal pain, diarrhea, nausea and vomiting. Genitourinary: Negative for dysuria, flank pain, frequency and urgency. Musculoskeletal: Positive for back pain. Negative for neck pain. Skin: Negative for rash and wound. Neurological: Negative for dizziness, syncope, weakness, light-headedness, numbness and headaches. Except as noted above the remainder of the review of systems was reviewed and negative.        PAST MEDICAL HISTORY     Past Medical History:   Diagnosis Date    Back pain     Diarrhea     Fracture, pelvis closed ([facility] [address] 01/03/2015    fell off roof    use: No    Sexual activity: Yes     Partners: Female   Other Topics Concern    Not on file   Social History Narrative    Not on file     Social Determinants of Health     Financial Resource Strain:     Difficulty of Paying Living Expenses:    Food Insecurity:     Worried About Running Out of Food in the Last Year:     [address] in the Last Year:    Transportation Needs:     Lack of Transportation (Medical):  Lack of Transportation (Non-Medical):    Physical Activity:     Days of Exercise per Week:     Minutes of Exercise per Session:    Stress:     Feeling of Stress :    Social Connections:     Frequency of Communication with Friends and Family:     Frequency of Social Gatherings with Friends and Family:     Attends Anab[employer] Services:     Active Member of Clubs or Organizations:     Attends Club or Organization Meetings:     Marital Status:    Intimate Partner Violence:     Fear of Current or Ex-Partner:     Emotionally Abused:     Physically Abused:     Sexually Abused:        SCREENINGS                        PHYSICAL EXAM    (up to 7 for level 4, 8 or more for level 5)     ED Triage Vitals   BP Temp Temp src Pulse Resp SpO2 Height Weight   -- -- -- -- -- -- -- --       Physical Exam    DIAGNOSTIC RESULTS     EKG: All EKG's are interpreted by the Emergency Department Physician who either signs or Co-signs this chart in the absence of a cardiologist.        RADIOLOGY:   Non-plain film images such as CT, Ultrasound and MRI are read by the radiologist. Plain radiographic images are visualized and preliminarily interpreted by the emergency physician with the below findings:      Interpretation per the Radiologist below, if available at the time of this note:    XR PELVIS (1-2 VIEWS)   Final Result   1. Old healed fractures of the superior and inferior pubic rami on the   left. 2. There may be very minimal spurring of the femoral heads. There is   facet arthropathy at L4-5 and L5-S1. Signed by Dr Piero Zambrano            ED BEDSIDE ULTRASOUND:   Performed by ED Physician - none    LABS:  Labs Reviewed - No data to display    All other labs were within normal range or not returned as of this dictation. EMERGENCY DEPARTMENT COURSE and DIFFERENTIAL DIAGNOSIS/MDM:   Vitals:    Vitals:    07/08/21 1208   BP: (!) 146/79   Pulse: 79   Resp: 18   Temp: 98.3 °F (36.8 °C)   TempSrc: Oral   SpO2: 99%   Weight: 190 lb (86.2 kg)   Height: 5' 11\" (1.803 m)         MDM      REASSESSMENT      Discussed imaging, follow up and return parameters to which patient voices understanding and agreement. Patient is most concerned with a work note d/t difficulty getting around on roofs during this flare of pain. CRITICAL CARE TIME     CONSULTS:  None    PROCEDURES:  Unless otherwise noted below, none     Procedures         FINAL IMPRESSION      1. Strain of lumbar region, initial encounter          DISPOSITION/PLAN   DISPOSITION        PATIENT REFERRED TO:  Katia John MD  [facility] [phone]    Call         DISCHARGE MEDICATIONS:  New Prescriptions    No medications on file     Controlled Substances Monitoring:     RX Monitoring 3/2/2019   Attestation The Prescription Monitoring Report for this patient was reviewed today. Acute Pain Prescriptions Not required given exclusionary diagnoses. ..       (Please note that portions of this note were completed with a voice recognition program.  Efforts were made to edit the dictations but occasionally words are mis-transcribed.)    HODAN Sparks CNP (electronically signed)  Attending Emergency Physician         HODAN Sparks CNP  07/08/21 5737

## 2021-07-12 ENCOUNTER — OFFICE VISIT (OUTPATIENT)
Dept: FAMILY MEDICINE CLINIC | Facility: CLINIC | Age: 26
End: 2021-07-12

## 2021-07-12 VITALS
HEIGHT: 70 IN | DIASTOLIC BLOOD PRESSURE: 79 MMHG | OXYGEN SATURATION: 99 % | BODY MASS INDEX: 29.15 KG/M2 | WEIGHT: 203.6 LBS | TEMPERATURE: 97.4 F | SYSTOLIC BLOOD PRESSURE: 131 MMHG | HEART RATE: 91 BPM

## 2021-07-12 DIAGNOSIS — M25.552 LEFT HIP PAIN: ICD-10-CM

## 2021-07-12 DIAGNOSIS — R79.89 LOW TESTOSTERONE: Primary | ICD-10-CM

## 2021-07-12 PROCEDURE — 99214 OFFICE O/P EST MOD 30 MIN: CPT | Performed by: FAMILY MEDICINE

## 2021-07-12 RX ORDER — MELOXICAM 15 MG/1
15 TABLET ORAL DAILY
Qty: 30 TABLET | Refills: 2 | Status: SHIPPED | OUTPATIENT
Start: 2021-07-12 | End: 2021-10-11 | Stop reason: SDUPTHER

## 2021-07-12 RX ORDER — TIZANIDINE 4 MG/1
4 TABLET ORAL NIGHTLY PRN
Qty: 30 TABLET | Refills: 2 | Status: SHIPPED | OUTPATIENT
Start: 2021-07-12 | End: 2021-08-05

## 2021-07-12 NOTE — PROGRESS NOTES
"Chief Complaint  Follow-up    Subjective          Tj Benedict presents to [facility]  History of Present Illness  Patient feels better on current testosterone therapy, plans to follow-up with fertility clinic regarding azoospermia.  He reports left-sided hip pain over the past week worse with ambulating, improved with rest, not interested in narcotic therapy given potential fertility treatment in future.    Objective   Vital Signs:   /79 (BP Location: Left arm, Patient Position: Sitting, Cuff Size: Adult)   Pulse 91   Temp 97.4 °F (36.3 °C) (Temporal)   Ht 177.8 cm (70\")   Wt 92.4 kg (203 lb 9.6 oz)   SpO2 99%   BMI 29.21 kg/m²     Physical Exam  Vitals and nursing note reviewed.   Constitutional:       General: He is not in acute distress.     Appearance: He is not diaphoretic.   HENT:      Head: Normocephalic and atraumatic.      Nose: Nose normal.   Eyes:      General: No scleral icterus.        Right eye: No discharge.         Left eye: No discharge.      Conjunctiva/sclera: Conjunctivae normal.   Neck:      Trachea: No tracheal deviation.   Pulmonary:      Effort: Pulmonary effort is normal.   Musculoskeletal:      Comments: Antalgic gait  Hypertonic paraspinals on the left, with pain with resisted hip flexion   Skin:     General: Skin is warm and dry.      Coloration: Skin is not pale.   Neurological:      Mental Status: He is alert and oriented to person, place, and time.   Psychiatric:         Behavior: Behavior normal.         Thought Content: Thought content normal.         Judgment: Judgment normal.        Result Review :                 Assessment and Plan    Diagnoses and all orders for this visit:    1. Low testosterone (Primary)  -     Testosterone; Future    2. Left hip pain  -     meloxicam (MOBIC) 15 MG tablet; Take 1 tablet by mouth Daily.  Dispense: 30 tablet; Refill: 2  -     tiZANidine (ZANAFLEX) 4 MG tablet; Take 1 tablet by mouth At Night As " Needed for Muscle Spasms.  Dispense: 30 tablet; Refill: 2    Mobic and tizanidine above  Testosterone above  Follow-up as needed

## 2021-08-05 DIAGNOSIS — M25.552 LEFT HIP PAIN: ICD-10-CM

## 2021-08-05 RX ORDER — TIZANIDINE 4 MG/1
4 TABLET ORAL NIGHTLY PRN
Qty: 90 TABLET | Refills: 0 | Status: SHIPPED | OUTPATIENT
Start: 2021-08-05 | End: 2021-12-23

## 2021-08-06 ENCOUNTER — OFFICE VISIT (OUTPATIENT)
Dept: FAMILY MEDICINE CLINIC | Facility: CLINIC | Age: 26
End: 2021-08-06

## 2021-08-06 VITALS
HEART RATE: 97 BPM | HEIGHT: 70 IN | OXYGEN SATURATION: 98 % | DIASTOLIC BLOOD PRESSURE: 96 MMHG | SYSTOLIC BLOOD PRESSURE: 147 MMHG | BODY MASS INDEX: 28.98 KG/M2 | WEIGHT: 202.4 LBS | TEMPERATURE: 97.6 F

## 2021-08-06 DIAGNOSIS — F43.22 ADJUSTMENT DISORDER WITH ANXIOUS MOOD: Primary | ICD-10-CM

## 2021-08-06 PROCEDURE — 99214 OFFICE O/P EST MOD 30 MIN: CPT | Performed by: FAMILY MEDICINE

## 2021-08-06 RX ORDER — FLUOXETINE HYDROCHLORIDE 20 MG/1
20 CAPSULE ORAL DAILY
Qty: 30 CAPSULE | Refills: 1 | Status: SHIPPED | OUTPATIENT
Start: 2021-08-06 | End: 2021-08-30 | Stop reason: SDUPTHER

## 2021-08-06 RX ORDER — LORAZEPAM 0.5 MG/1
.25-.5 TABLET ORAL EVERY 8 HOURS PRN
Qty: 15 TABLET | Refills: 0 | Status: SHIPPED | OUTPATIENT
Start: 2021-08-06 | End: 2021-11-08 | Stop reason: SDUPTHER

## 2021-08-06 NOTE — PATIENT INSTRUCTIONS
Lorazepam injection  What is this medicine?  LORAZEPAM (lucio A ze malathi) is a benzodiazepine. It is used to treat anxiety and certain types of seizures. It is also used to cause sleep before surgery and to block the memory of the procedure.  This medicine may be used for other purposes; ask your health care provider or pharmacist if you have questions.  COMMON BRAND NAME(S): Ativan  What should I tell my health care provider before I take this medicine?  They need to know if you have any of these conditions:  · glaucoma  · history of drug or alcohol abuse problem  · kidney disease  · liver disease  · lung or breathing disease, like asthma  · mental illness  · myasthenia gravis  · Parkinson's disease  · suicidal thoughts, plans, or attempt; a previous suicide attempt by you or a family member  · an unusual or allergic reaction to lorazepam, other medicines, foods, dyes, or preservatives  · pregnant or trying to get pregnant  · breast-feeding  How should I use this medicine?  This medicine is for injection into a muscle or into a vein. It is given by a health care professional in a hospital or clinic setting.  Talk to your pediatrician regarding the use of this medicine in children. Special care may be needed.  Overdosage: If you think you have taken too much of this medicine contact a poison control center or emergency room at once.  NOTE: This medicine is only for you. Do not share this medicine with others.  What if I miss a dose?  This does not apply.  What may interact with this medicine?  Do not take this medicine with any of the following medications:  · narcotic medicines for cough  · sodium oxybate  This medicine may also interact with the following medications:  · alcohol  · antihistamines for allergy, cough and cold  · certain medicines for anxiety or sleep  · certain medicines for depression, like amitriptyline, fluoxetine, sertraline  · certain medicines for seizures like carbamazepine, phenobarbital,  phenytoin, primidone  · general anesthetics like lidocaine, pramoxine, tetracaine  · MAOIs like Carbex, Eldepryl, Marplan, Nardil, and Parnate  · medicines that relax muscles for surgery  · narcotic medicines for pain  · phenothiazines like chlorpromazine, mesoridazine, prochlorperazine, thioridazine  This list may not describe all possible interactions. Give your health care provider a list of all the medicines, herbs, non-prescription drugs, or dietary supplements you use. Also tell them if you smoke, drink alcohol, or use illegal drugs. Some items may interact with your medicine.  What should I watch for while using this medicine?  Tell your doctor or health care professional if your symptoms do not start to get better or if they get worse.  You may get drowsy or dizzy. Do not drive, use machinery, or do anything that needs mental alertness until you know how this medicine affects you. Do not stand or sit up quickly, especially if you are an older patient. This reduces the risk of dizzy or fainting spells. Alcohol may interfere with the effect of this medicine. Avoid alcoholic drinks.  If you are taking another medicine that also causes drowsiness, you may have more side effects. Give your health care provider a list of all medicines you use. Your doctor will tell you how much medicine to take. Do not take more medicine than directed. Call emergency for help if you have problems breathing or unusual sleepiness.  What side effects may I notice from receiving this medicine?  Side effects that you should report to your doctor or health care professional as soon as possible:  · allergic reactions like skin rash, itching or hives, swelling of the face, lips, or tongue  · breathing problems  · confusion  · loss of balance or coordination  · signs and symptoms of low blood pressure like dizziness; feeling faint or lightheaded, falls; unusually weak or tired  · suicidal thoughts or other mood changes  Side effects that  usually do not require medical attention (report to your doctor or health care professional if they continue or are bothersome):  · dizziness  · headache  · nausea, vomiting  · pain, redness, or irritation at the site where injected  · tiredness  This list may not describe all possible side effects. Call your doctor for medical advice about side effects. You may report side effects to FDA at 9-434-OTI-5051.  Where should I keep my medicine?  This medication will be given to you in a hospital or health clinic setting. You will not be given this medicine to take home.  NOTE: This sheet is a summary. It may not cover all possible information. If you have questions about this medicine, talk to your doctor, pharmacist, or health care provider.  © 2021 [employer]/Gold Standard (2016-09-15 14:45:14)  Fluoxetine capsules or tablets (Depression/Mood Disorders)  What is this medicine?  FLUOXETINE (floo OX e teen) belongs to a class of drugs known as selective serotonin reuptake inhibitors (SSRIs). It helps to treat mood problems such as depression, obsessive compulsive disorder, and panic attacks. It can also treat certain eating disorders.  This medicine may be used for other purposes; ask your health care provider or pharmacist if you have questions.  COMMON BRAND NAME(S): Prozac  What should I tell my health care provider before I take this medicine?  They need to know if you have any of these conditions:  · bipolar disorder or a family history of bipolar disorder  · bleeding disorders  · glaucoma  · heart disease  · liver disease  · low levels of sodium in the blood  · seizures  · suicidal thoughts, plans, or attempt; a previous suicide attempt by you or a family member  · take MAOIs like Carbex, Eldepryl, Marplan, Nardil, and Parnate  · take medicines that treat or prevent blood clots  · thyroid disease  · an unusual or allergic reaction to fluoxetine, other medicines, foods, dyes, or preservatives  · pregnant or trying to  get pregnant  · breast-feeding  How should I use this medicine?  Take this medicine by mouth with a glass of water. Follow the directions on the prescription label. You can take this medicine with or without food. Take your medicine at regular intervals. Do not take it more often than directed. Do not stop taking this medicine suddenly except upon the advice of your doctor. Stopping this medicine too quickly may cause serious side effects or your condition may worsen.  A special MedGuide will be given to you by the pharmacist with each prescription and refill. Be sure to read this information carefully each time.  Talk to your pediatrician regarding the use of this medicine in children. While this drug may be prescribed for children as young as 7 years for selected conditions, precautions do apply.  Overdosage: If you think you have taken too much of this medicine contact a poison control center or emergency room at once.  NOTE: This medicine is only for you. Do not share this medicine with others.  What if I miss a dose?  If you miss a dose, skip the missed dose and go back to your regular dosing schedule. Do not take double or extra doses.  What may interact with this medicine?  Do not take this medicine with any of the following medications:  · other medicines containing fluoxetine, like Sarafem or Symbyax  · cisapride  · dronedarone  · linezolid  · MAOIs like Carbex, Eldepryl, Marplan, Nardil, and Parnate  · methylene blue (injected into a vein)  · pimozide  · thioridazine  This medicine may also interact with the following medications:  · alcohol  · amphetamines  · aspirin and aspirin-like medicines  · carbamazepine  · certain medicines for depression, anxiety, or psychotic disturbances  · certain medicines for migraine headaches like almotriptan, eletriptan, frovatriptan, naratriptan, rizatriptan, sumatriptan,  zolmitriptan  · digoxin  · diuretics  · fentanyl  · flecainide  · furazolidone  · isoniazid  · lithium  · medicines for sleep  · medicines that treat or prevent blood clots like warfarin, enoxaparin, and dalteparin  · NSAIDs, medicines for pain and inflammation, like ibuprofen or naproxen  · other medicines that prolong the QT interval (an abnormal heart rhythm)  · phenytoin  · procarbazine  · propafenone  · rasagiline  · ritonavir  · supplements like [place]'s wort, kava kava, valerian  · tramadol  · tryptophan  · vinblastine  This list may not describe all possible interactions. Give your health care provider a list of all the medicines, herbs, non-prescription drugs, or dietary supplements you use. Also tell them if you smoke, drink alcohol, or use illegal drugs. Some items may interact with your medicine.  What should I watch for while using this medicine?  Tell your doctor if your symptoms do not get better or if they get worse. Visit your doctor or health care professional for regular checks on your progress. Because it may take several weeks to see the full effects of this medicine, it is important to continue your treatment as prescribed by your doctor.  Patients and their families should watch out for new or worsening thoughts of suicide or depression. Also watch out for sudden changes in feelings such as feeling anxious, agitated, panicky, irritable, hostile, aggressive, impulsive, severely restless, overly excited and hyperactive, or not being able to sleep. If this happens, especially at the beginning of treatment or after a change in dose, call your health care professional.  You may get drowsy or dizzy. Do not drive, use machinery, or do anything that needs mental alertness until you know how this medicine affects you. Do not stand or sit up quickly, especially if you are an older patient. This reduces the risk of dizzy or fainting spells. Alcohol may interfere with the effect of this medicine. Avoid  alcoholic drinks.  Your mouth may get dry. Chewing sugarless gum or sucking hard candy, and drinking plenty of water may help. Contact your doctor if the problem does not go away or is severe.  This medicine may affect blood sugar levels. If you have diabetes, check with your doctor or health care professional before you change your diet or the dose of your diabetic medicine.  What side effects may I notice from receiving this medicine?  Side effects that you should report to your doctor or health care professional as soon as possible:  · allergic reactions like skin rash, itching or hives, swelling of the face, lips, or tongue  · anxious  · black, tarry stools  · breathing problems  · changes in vision  · confusion  · elevated mood, decreased need for sleep, racing thoughts, impulsive behavior  · eye pain  · fast, irregular heartbeat  · feeling faint or lightheaded, falls  · feeling agitated, angry, or irritable  · hallucination, loss of contact with reality  · loss of balance or coordination  · loss of memory  · painful or prolonged erections  · restlessness, pacing, inability to keep still  · seizures  · stiff muscles  · suicidal thoughts or other mood changes  · trouble sleeping  · unusual bleeding or bruising  · unusually weak or tired  · vomiting  Side effects that usually do not require medical attention (report to your doctor or health care professional if they continue or are bothersome):  · change in appetite or weight  · change in sex drive or performance  · diarrhea  · dry mouth  · headache  · increased sweating  · nausea  · tremors  This list may not describe all possible side effects. Call your doctor for medical advice about side effects. You may report side effects to FDA at 5-764-EFQ-5899.  Where should I keep my medicine?  Keep out of the reach of children.  Store at room temperature between 15 and 30 degrees C (59 and 86 degrees F). Throw away any unused medicine after the expiration date.  NOTE:  This sheet is a summary. It may not cover all possible information. If you have questions about this medicine, talk to your doctor, pharmacist, or health care provider.  © 2021 [employer]/Gold Standard (2019-08-08 11:56:53)

## 2021-08-06 NOTE — PROGRESS NOTES
"Chief Complaint  Anxiety (PHQ-9=1   , KING-7=5)    Subjective          Tj Benedict presents to [facility]  History of Present Illness  Recent worsening anxiety and stress surrounding the following:  Has been trying to have kids for 3 years, has azospermia  Saw specialist this week, workup and treatment would cost money he does not have ($22k)  Grandmother put on hospice today  Struggles with stress at work  Tearful today.     Objective   Vital Signs:   /96 (BP Location: Left arm, Patient Position: Sitting, Cuff Size: Adult)   Pulse 97   Temp 97.6 °F (36.4 °C) (Temporal)   Ht 177.8 cm (70\")   Wt 91.8 kg (202 lb 6.4 oz)   SpO2 98%   BMI 29.04 kg/m²     Physical Exam  Vitals and nursing note reviewed.   Constitutional:       General: He is not in acute distress.     Appearance: He is not diaphoretic.   HENT:      Head: Normocephalic and atraumatic.      Nose: Nose normal.   Eyes:      General: No scleral icterus.        Right eye: No discharge.         Left eye: No discharge.      Conjunctiva/sclera: Conjunctivae normal.   Neck:      Trachea: No tracheal deviation.   Pulmonary:      Effort: Pulmonary effort is normal.   Skin:     General: Skin is warm and dry.      Coloration: Skin is not pale.   Neurological:      Mental Status: He is alert and oriented to person, place, and time.   Psychiatric:         Mood and Affect: Affect is tearful.         Behavior: Behavior normal.         Thought Content: Thought content normal. Thought content does not include suicidal ideation. Thought content does not include suicidal plan.         Judgment: Judgment normal.        Result Review :                 Assessment and Plan    Diagnoses and all orders for this visit:    1. Adjustment disorder with anxious mood (Primary)  -     FLUoxetine (PROzac) 20 MG capsule; Take 1 capsule by mouth Daily.  Dispense: 30 capsule; Refill: 1  -     LORazepam (Ativan) 0.5 MG tablet; Take 0.5-1 tablets " by mouth Every 8 (Eight) Hours As Needed for Anxiety.  Dispense: 15 tablet; Refill: 0        Follow Up   Return in about 6 weeks (around 9/17/2021) for Recheck.  Patient was given instructions and counseling regarding his condition or for health maintenance advice. Please see specific information pulled into the AVS if appropriate.

## 2021-08-30 ENCOUNTER — TELEPHONE (OUTPATIENT)
Dept: FAMILY MEDICINE CLINIC | Facility: CLINIC | Age: 26
End: 2021-08-30

## 2021-08-30 DIAGNOSIS — F43.22 ADJUSTMENT DISORDER WITH ANXIOUS MOOD: ICD-10-CM

## 2021-08-30 RX ORDER — FLUOXETINE HYDROCHLORIDE 20 MG/1
20 CAPSULE ORAL DAILY
Qty: 90 CAPSULE | Refills: 0 | Status: SHIPPED | OUTPATIENT
Start: 2021-08-30 | End: 2021-11-08 | Stop reason: SDUPTHER

## 2021-10-07 ENCOUNTER — PATIENT MESSAGE (OUTPATIENT)
Dept: FAMILY MEDICINE CLINIC | Facility: CLINIC | Age: 26
End: 2021-10-07

## 2021-10-07 DIAGNOSIS — R11.0 NAUSEA: ICD-10-CM

## 2021-10-10 RX ORDER — ONDANSETRON 4 MG/1
4 TABLET, ORALLY DISINTEGRATING ORAL EVERY 12 HOURS PRN
Qty: 30 TABLET | Refills: 2 | Status: SHIPPED | OUTPATIENT
Start: 2021-10-10 | End: 2022-01-19 | Stop reason: SDUPTHER

## 2021-10-10 NOTE — TELEPHONE ENCOUNTER
From: Tj Benedict  To: Masoud Quarles DO  Sent: 10/7/2021 3:42 PM CDT  Subject: Prescription Question    I am out of my nausea medicine and ativan. I think i missed my appointment so i need to reschedule that as well. Please and thank you.

## 2021-10-11 DIAGNOSIS — M25.552 LEFT HIP PAIN: ICD-10-CM

## 2021-10-11 RX ORDER — MELOXICAM 15 MG/1
15 TABLET ORAL DAILY
Qty: 30 TABLET | Refills: 2 | Status: SHIPPED | OUTPATIENT
Start: 2021-10-11 | End: 2022-05-20 | Stop reason: SDUPTHER

## 2021-10-19 ENCOUNTER — TELEPHONE (OUTPATIENT)
Dept: FAMILY MEDICINE CLINIC | Facility: CLINIC | Age: 26
End: 2021-10-19

## 2021-11-05 ENCOUNTER — TELEPHONE (OUTPATIENT)
Dept: FAMILY MEDICINE CLINIC | Facility: CLINIC | Age: 26
End: 2021-11-05

## 2021-11-08 ENCOUNTER — OFFICE VISIT (OUTPATIENT)
Dept: FAMILY MEDICINE CLINIC | Facility: CLINIC | Age: 26
End: 2021-11-08

## 2021-11-08 VITALS
WEIGHT: 206 LBS | SYSTOLIC BLOOD PRESSURE: 151 MMHG | HEIGHT: 70 IN | HEART RATE: 85 BPM | BODY MASS INDEX: 29.49 KG/M2 | DIASTOLIC BLOOD PRESSURE: 86 MMHG | OXYGEN SATURATION: 99 % | TEMPERATURE: 97.6 F

## 2021-11-08 DIAGNOSIS — F43.22 ADJUSTMENT DISORDER WITH ANXIOUS MOOD: ICD-10-CM

## 2021-11-08 DIAGNOSIS — Z79.899 HIGH RISK MEDICATION USE: Primary | ICD-10-CM

## 2021-11-08 PROCEDURE — 99214 OFFICE O/P EST MOD 30 MIN: CPT | Performed by: FAMILY MEDICINE

## 2021-11-08 RX ORDER — LORAZEPAM 0.5 MG/1
.25-.5 TABLET ORAL EVERY 8 HOURS PRN
Qty: 15 TABLET | Refills: 0 | Status: SHIPPED | OUTPATIENT
Start: 2021-11-08 | End: 2021-12-28 | Stop reason: SDUPTHER

## 2021-11-08 RX ORDER — FLUOXETINE HYDROCHLORIDE 40 MG/1
40 CAPSULE ORAL DAILY
Qty: 30 CAPSULE | Refills: 2 | Status: SHIPPED | OUTPATIENT
Start: 2021-11-08 | End: 2021-12-06 | Stop reason: SDUPTHER

## 2021-11-08 NOTE — PROGRESS NOTES
"Chief Complaint  Follow-up and Anxiety (patient currently taking prozac 20mg, wanting to discuss dose change.  Needing refill on ativan )    Subjective          Tj Benedict presents to [facility]  History of Present Illness  Anxiety worsened since last visit after patient broke up with significant other, requiring restraining order.  He did report improvement with Ativan, not sure if he had improvement with Prozac but does feel like it may flatten his severe emotional reactions to stress.    Objective   Vital Signs:   /86 (BP Location: Left arm, Patient Position: Sitting, Cuff Size: Adult)   Pulse 85   Temp 97.6 °F (36.4 °C) (Temporal)   Ht 177.8 cm (70\")   Wt 93.4 kg (206 lb)   SpO2 99%   BMI 29.56 kg/m²     Physical Exam  Vitals and nursing note reviewed.   Constitutional:       General: He is not in acute distress.     Appearance: He is not diaphoretic.   HENT:      Head: Normocephalic and atraumatic.      Nose: Nose normal.   Eyes:      General: No scleral icterus.        Right eye: No discharge.         Left eye: No discharge.      Conjunctiva/sclera: Conjunctivae normal.   Neck:      Trachea: No tracheal deviation.   Pulmonary:      Effort: Pulmonary effort is normal.   Skin:     General: Skin is warm and dry.      Coloration: Skin is not pale.   Neurological:      Mental Status: He is alert and oriented to person, place, and time.   Psychiatric:         Behavior: Behavior normal.         Thought Content: Thought content normal.         Judgment: Judgment normal.        Result Review :                 Assessment and Plan    Diagnoses and all orders for this visit:    1. High risk medication use (Primary)  -     Urine Drug Screen - Urine, Clean Catch; Future    2. Adjustment disorder with anxious mood  -     LORazepam (Ativan) 0.5 MG tablet; Take 0.5-1 tablets by mouth Every 8 (Eight) Hours As Needed for Anxiety.  Dispense: 15 tablet; Refill: 0  -     FLUoxetine " (PROzac) 40 MG capsule; Take 1 capsule by mouth Daily.  Dispense: 30 capsule; Refill: 2    UDS needs to be complete prior to refill of further Ativan    Follow Up   Return in about 4 weeks (around 12/6/2021).  Patient was given instructions and counseling regarding his condition or for health maintenance advice. Please see specific information pulled into the AVS if appropriate.

## 2021-12-06 ENCOUNTER — TELEPHONE (OUTPATIENT)
Dept: FAMILY MEDICINE CLINIC | Facility: CLINIC | Age: 26
End: 2021-12-06

## 2021-12-06 DIAGNOSIS — F43.22 ADJUSTMENT DISORDER WITH ANXIOUS MOOD: ICD-10-CM

## 2021-12-06 RX ORDER — FLUOXETINE HYDROCHLORIDE 40 MG/1
40 CAPSULE ORAL DAILY
Qty: 90 CAPSULE | Refills: 1 | Status: SHIPPED | OUTPATIENT
Start: 2021-12-06 | End: 2021-12-28 | Stop reason: SDUPTHER

## 2021-12-09 ENCOUNTER — TELEPHONE (OUTPATIENT)
Dept: INTERNAL MEDICINE | Facility: CLINIC | Age: 26
End: 2021-12-09

## 2021-12-09 NOTE — TELEPHONE ENCOUNTER
called talked about his 4 no shows patient stated he didn't know if he cancel the same day that would affected him.

## 2021-12-23 DIAGNOSIS — M25.552 LEFT HIP PAIN: ICD-10-CM

## 2021-12-23 RX ORDER — TIZANIDINE 4 MG/1
4 TABLET ORAL NIGHTLY PRN
Qty: 90 TABLET | Refills: 0 | Status: SHIPPED | OUTPATIENT
Start: 2021-12-23

## 2021-12-28 ENCOUNTER — OFFICE VISIT (OUTPATIENT)
Dept: FAMILY MEDICINE CLINIC | Facility: CLINIC | Age: 26
End: 2021-12-28

## 2021-12-28 VITALS
HEART RATE: 98 BPM | BODY MASS INDEX: 28.63 KG/M2 | HEIGHT: 70 IN | SYSTOLIC BLOOD PRESSURE: 154 MMHG | DIASTOLIC BLOOD PRESSURE: 104 MMHG | TEMPERATURE: 97.5 F | WEIGHT: 200 LBS

## 2021-12-28 DIAGNOSIS — R03.0 ELEVATED BLOOD PRESSURE READING: Primary | ICD-10-CM

## 2021-12-28 DIAGNOSIS — F43.22 ADJUSTMENT DISORDER WITH ANXIOUS MOOD: ICD-10-CM

## 2021-12-28 PROCEDURE — 99214 OFFICE O/P EST MOD 30 MIN: CPT | Performed by: FAMILY MEDICINE

## 2021-12-28 RX ORDER — FLUOXETINE HYDROCHLORIDE 40 MG/1
40 CAPSULE ORAL DAILY
Qty: 90 CAPSULE | Refills: 1 | Status: SHIPPED | OUTPATIENT
Start: 2021-12-28 | End: 2022-05-20 | Stop reason: SDUPTHER

## 2021-12-28 RX ORDER — LORAZEPAM 0.5 MG/1
0.5 TABLET ORAL NIGHTLY PRN
Qty: 30 TABLET | Refills: 0 | Status: SHIPPED | OUTPATIENT
Start: 2021-12-28 | End: 2022-01-30 | Stop reason: SDUPTHER

## 2021-12-28 RX ORDER — TRAZODONE HYDROCHLORIDE 50 MG/1
50 TABLET ORAL NIGHTLY
Qty: 90 TABLET | Refills: 0 | Status: SHIPPED | OUTPATIENT
Start: 2021-12-28 | End: 2022-03-21

## 2022-01-16 ENCOUNTER — PATIENT MESSAGE (OUTPATIENT)
Dept: FAMILY MEDICINE CLINIC | Facility: CLINIC | Age: 27
End: 2022-01-16

## 2022-01-16 DIAGNOSIS — R11.0 NAUSEA: ICD-10-CM

## 2022-01-19 RX ORDER — ONDANSETRON 4 MG/1
4 TABLET, ORALLY DISINTEGRATING ORAL EVERY 8 HOURS PRN
Qty: 30 TABLET | Refills: 2 | Status: SHIPPED | OUTPATIENT
Start: 2022-01-19 | End: 2022-05-20 | Stop reason: SDUPTHER

## 2022-01-19 NOTE — TELEPHONE ENCOUNTER
From: Tj Benedict  To: Masoud Quarles DO  Sent: 1/16/2022 4:54 AM CST  Subject: Need an x-ray on my ribs.     Also out of my nausea medicine. I think I have one to two fractured or broke ribs and feels like I have fluid on my lungs. I have it wrapped and an arm brace and doing what I can. Just need to be seen as soon as possible please.

## 2022-01-27 ENCOUNTER — PATIENT MESSAGE (OUTPATIENT)
Dept: FAMILY MEDICINE CLINIC | Facility: CLINIC | Age: 27
End: 2022-01-27

## 2022-01-27 DIAGNOSIS — F43.22 ADJUSTMENT DISORDER WITH ANXIOUS MOOD: ICD-10-CM

## 2022-01-30 RX ORDER — LORAZEPAM 0.5 MG/1
0.5 TABLET ORAL NIGHTLY PRN
Qty: 30 TABLET | Refills: 0 | Status: SHIPPED | OUTPATIENT
Start: 2022-01-30 | End: 2022-02-07 | Stop reason: SDUPTHER

## 2022-01-30 NOTE — TELEPHONE ENCOUNTER
From: Tj Benedict  To: Masoud Quarles DO  Sent: 1/27/2022 3:43 PM CST  Subject: Medicine    Need a refill on my Ativan.

## 2022-02-07 DIAGNOSIS — F43.22 ADJUSTMENT DISORDER WITH ANXIOUS MOOD: ICD-10-CM

## 2022-02-07 NOTE — TELEPHONE ENCOUNTER
Caller: Tj Benedict    Relationship: Self    Best call back number: [phone] ([phone])    Requested Prescriptions:   Requested Prescriptions     Pending Prescriptions Disp Refills   • LORazepam (Ativan) 0.5 MG tablet 30 tablet 0     Sig: Take 1 tablet by mouth At Night As Needed for Anxiety.        Pharmacy where request should be sent:    [facility]/pharmacy #[id number] - [place], [place] - [address] AT [address]CROSS FROM [address]  [phone] [facility] [phone]   [phone]  Additional details provided by patient:   Does the patient have less than a 3 day supply:  [x] Yes  [] No    Colleen Flowers Rep   02/07/22 15:03 CST

## 2022-02-08 NOTE — TELEPHONE ENCOUNTER
Rx Refill Note  Requested Prescriptions     Pending Prescriptions Disp Refills   • LORazepam (Ativan) 0.5 MG tablet 30 tablet 0     Sig: Take 1 tablet by mouth At Night As Needed for Anxiety.      Last office visit with prescribing clinician: 12/28/2021      Next office visit with prescribing clinician: 3/28/2022            Leila Rowell MA  02/08/22, 13:13 CST

## 2022-02-09 RX ORDER — LORAZEPAM 0.5 MG/1
0.5 TABLET ORAL NIGHTLY PRN
Qty: 30 TABLET | Refills: 2 | Status: SHIPPED | OUTPATIENT
Start: 2022-02-09 | End: 2022-03-07 | Stop reason: SDUPTHER

## 2022-03-07 DIAGNOSIS — F43.22 ADJUSTMENT DISORDER WITH ANXIOUS MOOD: ICD-10-CM

## 2022-03-08 RX ORDER — PANTOPRAZOLE SODIUM 40 MG/1
40 TABLET, DELAYED RELEASE ORAL DAILY
Qty: 90 TABLET | Refills: 0 | Status: SHIPPED | OUTPATIENT
Start: 2022-03-08 | End: 2022-05-20 | Stop reason: SDUPTHER

## 2022-03-08 RX ORDER — LORAZEPAM 0.5 MG/1
0.5 TABLET ORAL NIGHTLY PRN
Qty: 30 TABLET | Refills: 2 | Status: SHIPPED | OUTPATIENT
Start: 2022-03-08 | End: 2022-07-29

## 2022-03-09 ENCOUNTER — HOSPITAL ENCOUNTER (EMERGENCY)
Age: 27
Discharge: HOME | End: 2022-03-10
Attending: EMERGENCY MEDICINE
Payer: MEDICAID

## 2022-03-09 DIAGNOSIS — R10.84 GENERALIZED ABDOMINAL PAIN: ICD-10-CM

## 2022-03-09 DIAGNOSIS — R03.0 ELEVATED BLOOD PRESSURE READING: ICD-10-CM

## 2022-03-09 DIAGNOSIS — E86.0 DEHYDRATION: ICD-10-CM

## 2022-03-09 DIAGNOSIS — K52.9 COLITIS: ICD-10-CM

## 2022-03-09 DIAGNOSIS — R11.14 BILIOUS VOMITING WITH NAUSEA: Primary | ICD-10-CM

## 2022-03-09 LAB
ALBUMIN SERPL-MCNC: 5.4 G/DL (ref 3.5–5.2)
ALP BLD-CCNC: 103 U/L (ref 40–130)
ALT SERPL-CCNC: 19 U/L (ref 5–41)
ANION GAP SERPL CALCULATED.3IONS-SCNC: 16 MMOL/L (ref 7–19)
AST SERPL-CCNC: 23 U/L (ref 5–40)
BASOPHILS ABSOLUTE: 0 K/UL (ref 0–0.2)
BASOPHILS RELATIVE PERCENT: 0.4 % (ref 0–1)
BILIRUB SERPL-MCNC: 0.6 MG/DL (ref 0.2–1.2)
BUN BLDV-MCNC: 8 MG/DL (ref 6–20)
C-REACTIVE PROTEIN: <0.3 MG/DL (ref 0–0.5)
CALCIUM SERPL-MCNC: 9.8 MG/DL (ref 8.6–10)
CHLORIDE BLD-SCNC: 98 MMOL/L (ref 98–111)
CO2: 22 MMOL/L (ref 22–29)
CREAT SERPL-MCNC: 0.8 MG/DL (ref 0.5–1.2)
EOSINOPHILS ABSOLUTE: 0 K/UL (ref 0–0.6)
EOSINOPHILS RELATIVE PERCENT: 0.4 % (ref 0–5)
GFR AFRICAN AMERICAN: >59
GFR NON-AFRICAN AMERICAN: >60
GLUCOSE BLD-MCNC: 131 MG/DL (ref 74–109)
HCT VFR BLD CALC: 50.7 % (ref 42–52)
HEMOGLOBIN: 17.1 G/DL (ref 14–18)
IMMATURE GRANULOCYTES #: 0 K/UL
LACTIC ACID: 2.7 MMOL/L (ref 0.5–1.9)
LIPASE: 38 U/L (ref 13–60)
LYMPHOCYTES ABSOLUTE: 1.5 K/UL (ref 1.1–4.5)
LYMPHOCYTES RELATIVE PERCENT: 16.4 % (ref 20–40)
MCH RBC QN AUTO: 31.6 PG (ref 27–31)
MCHC RBC AUTO-ENTMCNC: 33.7 G/DL (ref 33–37)
MCV RBC AUTO: 93.7 FL (ref 80–94)
MONOCYTES ABSOLUTE: 0.3 K/UL (ref 0–0.9)
MONOCYTES RELATIVE PERCENT: 2.9 % (ref 0–10)
NEUTROPHILS ABSOLUTE: 7.2 K/UL (ref 1.5–7.5)
NEUTROPHILS RELATIVE PERCENT: 79.6 % (ref 50–65)
PDW BLD-RTO: 13.8 % (ref 11.5–14.5)
PLATELET # BLD: 309 K/UL (ref 130–400)
PMV BLD AUTO: 9.2 FL (ref 9.4–12.4)
POTASSIUM REFLEX MAGNESIUM: 3.7 MMOL/L (ref 3.5–5)
RBC # BLD: 5.41 M/UL (ref 4.7–6.1)
SODIUM BLD-SCNC: 136 MMOL/L (ref 136–145)
TOTAL PROTEIN: 8.3 G/DL (ref 6.6–8.7)
WBC # BLD: 9 K/UL (ref 4.8–10.8)

## 2022-03-09 PROCEDURE — 96361 HYDRATE IV INFUSION ADD-ON: CPT

## 2022-03-09 PROCEDURE — 96375 TX/PRO/DX INJ NEW DRUG ADDON: CPT

## 2022-03-09 PROCEDURE — 99283 EMERGENCY DEPT VISIT LOW MDM: CPT

## 2022-03-09 PROCEDURE — 2580000003 HC RX 258: Performed by: EMERGENCY MEDICINE

## 2022-03-09 PROCEDURE — 6360000002 HC RX W HCPCS: Performed by: EMERGENCY MEDICINE

## 2022-03-09 PROCEDURE — 93005 ELECTROCARDIOGRAM TRACING: CPT | Performed by: EMERGENCY MEDICINE

## 2022-03-09 RX ORDER — KETOROLAC TROMETHAMINE 30 MG/ML
30 INJECTION, SOLUTION INTRAMUSCULAR; INTRAVENOUS ONCE
Status: COMPLETED | OUTPATIENT
Start: 2022-03-09 | End: 2022-03-09

## 2022-03-09 RX ORDER — SODIUM CHLORIDE, SODIUM LACTATE, POTASSIUM CHLORIDE, AND CALCIUM CHLORIDE .6; .31; .03; .02 G/100ML; G/100ML; G/100ML; G/100ML
30 INJECTION, SOLUTION INTRAVENOUS ONCE
Status: COMPLETED | OUTPATIENT
Start: 2022-03-09 | End: 2022-03-10

## 2022-03-09 RX ORDER — ONDANSETRON 2 MG/ML
4 INJECTION INTRAMUSCULAR; INTRAVENOUS ONCE
Status: COMPLETED | OUTPATIENT
Start: 2022-03-09 | End: 2022-03-09

## 2022-03-09 RX ORDER — HALOPERIDOL 5 MG/ML
5 INJECTION INTRAMUSCULAR ONCE
Status: COMPLETED | OUTPATIENT
Start: 2022-03-09 | End: 2022-03-09

## 2022-03-09 RX ORDER — DIPHENHYDRAMINE HYDROCHLORIDE 50 MG/ML
25 INJECTION INTRAMUSCULAR; INTRAVENOUS ONCE
Status: DISCONTINUED | OUTPATIENT
Start: 2022-03-09 | End: 2022-03-10 | Stop reason: HOSPADM

## 2022-03-09 RX ORDER — NALBUPHINE HCL 10 MG/ML
10 AMPUL (ML) INJECTION ONCE
Status: COMPLETED | OUTPATIENT
Start: 2022-03-09 | End: 2022-03-10

## 2022-03-09 RX ADMIN — SODIUM CHLORIDE, POTASSIUM CHLORIDE, SODIUM LACTATE AND CALCIUM CHLORIDE 1000 ML: 600; 310; 30; 20 INJECTION, SOLUTION INTRAVENOUS at 23:36

## 2022-03-09 RX ADMIN — KETOROLAC TROMETHAMINE 30 MG: 30 INJECTION, SOLUTION INTRAMUSCULAR at 23:34

## 2022-03-09 RX ADMIN — HALOPERIDOL LACTATE 5 MG: 5 INJECTION, SOLUTION INTRAMUSCULAR at 23:35

## 2022-03-09 RX ADMIN — ONDANSETRON 4 MG: 2 INJECTION INTRAMUSCULAR; INTRAVENOUS at 23:33

## 2022-03-09 ASSESSMENT — ENCOUNTER SYMPTOMS
PHOTOPHOBIA: 0
SHORTNESS OF BREATH: 0
COUGH: 0
ABDOMINAL PAIN: 1
CONSTIPATION: 0
NAUSEA: 1
FACIAL SWELLING: 0
EYE PAIN: 0
VOMITING: 1
DIARRHEA: 1
BACK PAIN: 0
WHEEZING: 0

## 2022-03-09 ASSESSMENT — PAIN SCALES - GENERAL
PAINLEVEL_OUTOF10: 10
PAINLEVEL_OUTOF10: 10

## 2022-03-09 ASSESSMENT — PAIN DESCRIPTION - PAIN TYPE: TYPE: ACUTE PAIN

## 2022-03-10 ENCOUNTER — APPOINTMENT (OUTPATIENT)
Dept: CT IMAGING | Age: 27
End: 2022-03-10
Payer: MEDICAID

## 2022-03-10 VITALS
DIASTOLIC BLOOD PRESSURE: 88 MMHG | SYSTOLIC BLOOD PRESSURE: 128 MMHG | WEIGHT: 190 LBS | RESPIRATION RATE: 16 BRPM | HEART RATE: 85 BPM | OXYGEN SATURATION: 95 % | TEMPERATURE: 97.1 F | HEIGHT: 70 IN | BODY MASS INDEX: 27.2 KG/M2

## 2022-03-10 LAB
AMPHETAMINE SCREEN, URINE: NEGATIVE
BARBITURATE SCREEN URINE: NEGATIVE
BENZODIAZEPINE SCREEN, URINE: NEGATIVE
BILIRUBIN URINE: NEGATIVE
BLOOD, URINE: NEGATIVE
CANNABINOID SCREEN URINE: NEGATIVE
CLARITY: CLEAR
COCAINE METABOLITE SCREEN URINE: POSITIVE
COLOR: YELLOW
EKG P AXIS: 51 DEGREES
EKG P-R INTERVAL: 130 MS
EKG Q-T INTERVAL: 394 MS
EKG QRS DURATION: 94 MS
EKG QTC CALCULATION (BAZETT): 422 MS
EKG T AXIS: 57 DEGREES
GLUCOSE URINE: NEGATIVE MG/DL
KETONES, URINE: 15 MG/DL
LEUKOCYTE ESTERASE, URINE: NEGATIVE
Lab: ABNORMAL
NITRITE, URINE: NEGATIVE
OPIATE SCREEN URINE: NEGATIVE
PH UA: 8.5 (ref 5–8)
PROTEIN UA: NEGATIVE MG/DL
SEDIMENTATION RATE, ERYTHROCYTE: 3 MM/HR (ref 0–10)
SPECIFIC GRAVITY UA: 1.04 (ref 1–1.03)
UROBILINOGEN, URINE: 0.2 E.U./DL

## 2022-03-10 PROCEDURE — 74177 CT ABD & PELVIS W/CONTRAST: CPT

## 2022-03-10 PROCEDURE — 83690 ASSAY OF LIPASE: CPT

## 2022-03-10 PROCEDURE — 85025 COMPLETE CBC W/AUTO DIFF WBC: CPT

## 2022-03-10 PROCEDURE — 96375 TX/PRO/DX INJ NEW DRUG ADDON: CPT

## 2022-03-10 PROCEDURE — 83605 ASSAY OF LACTIC ACID: CPT

## 2022-03-10 PROCEDURE — 86140 C-REACTIVE PROTEIN: CPT

## 2022-03-10 PROCEDURE — 36415 COLL VENOUS BLD VENIPUNCTURE: CPT

## 2022-03-10 PROCEDURE — 6360000004 HC RX CONTRAST MEDICATION: Performed by: EMERGENCY MEDICINE

## 2022-03-10 PROCEDURE — 80053 COMPREHEN METABOLIC PANEL: CPT

## 2022-03-10 PROCEDURE — 6370000000 HC RX 637 (ALT 250 FOR IP): Performed by: EMERGENCY MEDICINE

## 2022-03-10 PROCEDURE — 85652 RBC SED RATE AUTOMATED: CPT

## 2022-03-10 PROCEDURE — 96361 HYDRATE IV INFUSION ADD-ON: CPT

## 2022-03-10 PROCEDURE — 6360000002 HC RX W HCPCS: Performed by: EMERGENCY MEDICINE

## 2022-03-10 PROCEDURE — 2500000003 HC RX 250 WO HCPCS: Performed by: EMERGENCY MEDICINE

## 2022-03-10 PROCEDURE — 80307 DRUG TEST PRSMV CHEM ANLYZR: CPT

## 2022-03-10 PROCEDURE — 96365 THER/PROPH/DIAG IV INF INIT: CPT

## 2022-03-10 PROCEDURE — 81003 URINALYSIS AUTO W/O SCOPE: CPT

## 2022-03-10 RX ORDER — ONDANSETRON 4 MG/1
4 TABLET, FILM COATED ORAL 3 TIMES DAILY PRN
Qty: 15 TABLET | Refills: 0 | Status: SHIPPED | OUTPATIENT
Start: 2022-03-10

## 2022-03-10 RX ORDER — METRONIDAZOLE 500 MG/1
500 TABLET ORAL 3 TIMES DAILY
Qty: 30 TABLET | Refills: 0 | Status: SHIPPED | OUTPATIENT
Start: 2022-03-10 | End: 2022-03-20

## 2022-03-10 RX ORDER — IBUPROFEN 600 MG/1
600 TABLET ORAL 3 TIMES DAILY PRN
Qty: 30 TABLET | Refills: 0 | Status: SHIPPED | OUTPATIENT
Start: 2022-03-10

## 2022-03-10 RX ORDER — TRAMADOL HYDROCHLORIDE 50 MG/1
50 TABLET ORAL EVERY 6 HOURS PRN
Qty: 12 TABLET | Refills: 0 | Status: SHIPPED | OUTPATIENT
Start: 2022-03-10 | End: 2022-03-13

## 2022-03-10 RX ORDER — CIPROFLOXACIN 500 MG/1
500 TABLET, FILM COATED ORAL 2 TIMES DAILY
Qty: 20 TABLET | Refills: 0 | Status: SHIPPED | OUTPATIENT
Start: 2022-03-10 | End: 2022-03-20

## 2022-03-10 RX ORDER — CIPROFLOXACIN 500 MG/1
500 TABLET, FILM COATED ORAL ONCE
Status: COMPLETED | OUTPATIENT
Start: 2022-03-10 | End: 2022-03-10

## 2022-03-10 RX ADMIN — METRONIDAZOLE 500 MG: 500 INJECTION, SOLUTION INTRAVENOUS at 01:59

## 2022-03-10 RX ADMIN — NALBUPHINE HYDROCHLORIDE 10 MG: 10 INJECTION, SOLUTION INTRAMUSCULAR; INTRAVENOUS; SUBCUTANEOUS at 00:33

## 2022-03-10 RX ADMIN — CIPROFLOXACIN 500 MG: 500 TABLET, FILM COATED ORAL at 01:58

## 2022-03-10 RX ADMIN — IOPAMIDOL 80 ML: 755 INJECTION, SOLUTION INTRAVENOUS at 00:08

## 2022-03-10 ASSESSMENT — PAIN SCALES - GENERAL: PAINLEVEL_OUTOF10: 8

## 2022-03-10 NOTE — ED NOTES
Additional blanket provided, no distress noted. LR infusing without difficulty.  Urinal at [address], [facility]  03/09/22 R Román 21, RN  03/09/22 [id number]

## 2022-03-10 NOTE — ED NOTES
1st LR bolus completed. 2nd liter bag of LR hung and infusing without difficulty. Pt continues to c/o abdominal pain 8/10. No s/s of acute distress noted. Nubain IVP given. Urinal placed back on b/s rail. Pt states he tried to void but produced no urine.       Tima Mohan RN  03/10/22 [id number]

## 2022-03-10 NOTE — ED PROVIDER NOTES
difficulty urinating. Negative for dysuria. Musculoskeletal: Negative for back pain and myalgias. Skin: Negative for pallor. Allergic/Immunologic: Negative for immunocompromised state. Neurological: Positive for light-headedness. Negative for speech difficulty and weakness. Psychiatric/Behavioral: Negative for agitation and confusion. All other systems reviewed and are negative.            PAST MEDICALHISTORY     Past Medical History:   Diagnosis Date    Back pain     Diarrhea     Fracture, pelvis closed ([facility] [address] 01/03/2015    fell off roof    GERD (gastroesophageal reflux disease)     Nausea & vomiting     Rectal bleed     Rib fractures 01/03/2015    fell off roof    Stomach pain          SURGICAL HISTORY       Past Surgical History:   Procedure Laterality Date    COLONOSCOPY N/A 11/19/2020    Dr Shane Peralta, Internal hemorrhoids-Grade 1 wo bleeding stigmata, 14 yr recall      TONSILLECTOMY      UPPER GASTROINTESTINAL ENDOSCOPY N/A 10/8/2020    Dr Johnston [place]     Previous Medications    ONDANSETRON (ZOFRAN ODT) 4 MG DISINTEGRATING TABLET    Take 1 tablet by mouth every 12 hours as needed for Nausea or Vomiting    PANTOPRAZOLE (PROTONIX) 40 MG TABLET    Take 1 tablet by mouth 2 times daily (before meals)    TIZANIDINE (ZANAFLEX) 4 MG TABLET    Take 1 tablet by mouth every 8 hours as needed (back pain)       ALLERGIES     Carrot flavor [flavoring agent]    FAMILY HISTORY       Family History   Problem Relation Age of Onset    Esophageal Cancer Other     Colon Cancer Neg Hx     Liver Cancer Neg Hx     Liver Disease Neg Hx     Rectal Cancer Neg Hx     Stomach Cancer Neg Hx           SOCIAL HISTORY       Social History     Socioeconomic History    Marital status:      Spouse name: None    Number of children: None    Years of education: None    Highest education level: None   Occupational History    None   Tobacco Use    Smoking status: Current Every Day Smoker     Packs/day: 1.00     Years: 7.00     Pack years: 7.00     Types: Cigarettes    Smokeless tobacco: Never Used    Tobacco comment: slowly working towards it per patient    Vaping Use    Vaping Use: Former    Quit date: 7/1/2017   Substance and Sexual Activity    Alcohol use: Not Currently     Comment: socially    Drug use: No    Sexual activity: Yes     Partners: Female   Other Topics Concern    None   Social History Narrative    None     Social Determinants of Health     Financial Resource Strain:     Difficulty of Paying Living Expenses: Not on file   Food Insecurity:     Worried About Running Out of Food in the Last Year: Not on file    Corrine of Food in the Last Year: Not on file   Transportation Needs:     Lack of Transportation (Medical): Not on file    Lack of Transportation (Non-Medical):  Not on file   Physical Activity:     Days of Exercise per Week: Not on file    Minutes of Exercise per Session: Not on file   Stress:     Feeling of Stress : Not on file   Social Connections:     Frequency of Communication with Friends and Family: Not on file    Frequency of Social Gatherings with Friends and Family: Not on file    Attends Restoration Services: Not on file    Active Member of [address] [place] [address] [employer] or Organizations: Not on file    Attends Club or Organization Meetings: Not on file    Marital Status: Not on file   Intimate Partner Violence:     Fear of Current or Ex-Partner: Not on file    Emotionally Abused: Not on file    Physically Abused: Not on file    Sexually Abused: Not on file   Housing Stability:     Unable to Pay for Housing in the Last Year: Not on file    Number of [place] in the Last Year: Not on file    Unstable Housing in the Last Year: Not on file       SCREENINGS    Blanchard Coma Scale  Eye Opening: Spontaneous  Best Verbal Response: Oriented  Best Motor Response: Obeys commands  Chris Coma Scale Score: 15        PHYSICAL EXAM    (up to 7 for level 4, 8 or more for level 5)     ED Triage Vitals [03/09/22 2314]   BP Temp Temp Source Pulse Resp SpO2 Height Weight   (!) 159/97 97.8 °F (36.6 °C) Oral 78 17 100 % 5' 10\" (1.778 m) 190 lb (86.2 kg)       Physical Exam  Vitals reviewed. Constitutional:       General: He is awake. Appearance: Normal appearance. He is well-developed, well-groomed and normal weight. He is not ill-appearing or toxic-appearing. HENT:      Head:      Comments: Normocephalic/atraumatic. No jaundice. No anisocoria. No stridor. No angioedema. No epistaxis. Eyes:      Comments: No anisocoria. No subconjunctival hemorrhage. No septal cellulitis   Neck:      Comments: Midline trachea. No nuchal rigidity. No meningismus. No bony tenderness to palpation  Cardiovascular:      Comments: Regular rate and rhythm without murmur, gallops, or rubs. Pulmonary:      Comments: To auscultation bilaterally. Symmetric breath sounds. Good air movement. No wheezing, rhonchi, or rales  Abdominal:      Comments: Soft, tender. Guarding without rebound. Point of maximal intensity is the epigastric and left upper quadrant areas. Negative Ta's. No significant McBurney point tenderness. He does have tenderness to the left lower quadrant area. Genitourinary:     Comments: Deferred  Musculoskeletal:      Comments: No well localized spinal percussion tenderness. Normal range of motion of his extremities. No fracture or dislocation. No compartment syndrome. No acute limb ischemia   Lymphadenopathy:      Comments: Deferred   Skin:     Comments: And clammy. No petechial rash. No cellulitis appreciated   Neurological:      Mental Status: He is alert. Comments: Cranial nerves II through VII intact. Alert and oriented x4. Nonfocal neurologic examination   Psychiatric:         Behavior: Behavior is cooperative.       Comments: Cooperative and appropriate         DIAGNOSTIC RESULTS     EKG: All EKG's areinterpreted by the Emergency Department Physician who either signs or Co-signs this chart in the absence of a cardiologist.    EKG on my review: Normal sinus rhythm at a rate of 74. Normal intervals. Normal axis. Normal QRS segments. Normal ST and T segments. No STEMI. Occasional PACs. No S1Q3T3. CT ABDOMEN PELVIS W IV CONTRAST Additional Contrast? None    (Results Pending)     CT abdomen/pelvis with IV contrast per [facility]: Extensive wall thickening throughout the large bowel with relative sparing of the sigmoid colon and rectum consistent with colitis. No abscess or free air. Normal appendix. Chronic fracture of the left inferior pubic ramus. LABS:  Labs Reviewed   CBC WITH AUTO DIFFERENTIAL - Abnormal; Notable for the following components:       Result Value    MCH 31.6 (*)     MPV 9.2 (*)     Neutrophils % 79.6 (*)     Lymphocytes % 16.4 (*)     All other components within normal limits   COMPREHENSIVE METABOLIC PANEL W/ REFLEX TO MG FOR LOW K - Abnormal; Notable for the following components:    Glucose 131 (*)     Albumin 5.4 (*)     All other components within normal limits   URINALYSIS WITH REFLEX TO CULTURE - Abnormal; Notable for the following components:    Ketones, Urine 15 (*)     pH, UA 8.5 (*)     All other components within normal limits   LACTIC ACID - Abnormal; Notable for the following components:    Lactic Acid 2.7 (*)     All other components within normal limits    Narrative:     CALL  [facility] tel. ,  Chemistry results called to and read back by Kira Martinez 119 ED,  03/09/2022 23:54, by Jaswinder 95 - Abnormal; Notable for the following components:    Cocaine Metabolite Screen, Urine Positive (*)     All other components within normal limits   C DIFF TOXIN/ANTIGEN   FECAL LEUKOCYTES   CULTURE, STOOL   LIPASE   SEDIMENTATION RATE   C-REACTIVE PROTEIN       All other labs were within normal range or not returned as of this dictation.     EMERGENCY DEPARTMENT COURSE and DIFFERENTIAL breakthrough pain.) for up to 3 days. Intended supply: 3 days.  Take lowest dose possible to manage pain          (Please note that portions of this note were completed with a voice recognition program.  Efforts were made to edit thedictations but occasionally words are mis-transcribed.)    Carmen Garcia MD (electronically signed)  Attending Emergency Physician          Carmen Garcia MD  03/10/22 4129       Carmen Garcia MD  03/10/22 0228

## 2022-03-10 NOTE — ED NOTES
PT admits to drinking 2 shots of Fireball Whiskey approximately 16:00 today, states the pain started immediately after drinking. Dr. Wyatt Livingston made aware.       Vesta Falk RN  03/09/22 8160

## 2022-03-10 NOTE — ED NOTES
PT arrived ambulatory to ED-8 with c/o upper abd pain and vomiting since 16:00 today. PT advises he took 4 mg Zofran ODT at 15:00 without relief from nausea. PT actively vomiting upon triage one episode of incontinent diarrhea upon triage.      Digna Mcginnis RN  03/09/22 0960

## 2022-03-20 DIAGNOSIS — F43.22 ADJUSTMENT DISORDER WITH ANXIOUS MOOD: ICD-10-CM

## 2022-03-21 RX ORDER — TRAZODONE HYDROCHLORIDE 50 MG/1
TABLET ORAL
Qty: 90 TABLET | Refills: 0 | Status: SHIPPED | OUTPATIENT
Start: 2022-03-21 | End: 2022-05-20 | Stop reason: SDUPTHER

## 2022-03-21 NOTE — TELEPHONE ENCOUNTER
Rx Refill Note  Requested Prescriptions     Pending Prescriptions Disp Refills   • traZODone (DESYREL) 50 MG tablet [Pharmacy Med Name: TRAZODONE 50 MG TABLET] 90 tablet 0     Sig: TAKE 1 TABLET BY MOUTH EVERY DAY AT NIGHT      Last office visit with prescribing clinician: 12/28/2021      Next office visit with prescribing clinician: 3/21/2022            Vonda Ellison DEYSI  03/21/22, 11:51 CDT

## 2022-05-18 ENCOUNTER — OFFICE VISIT (OUTPATIENT)
Dept: FAMILY MEDICINE CLINIC | Facility: CLINIC | Age: 27
End: 2022-05-18

## 2022-05-18 VITALS
WEIGHT: 206.9 LBS | DIASTOLIC BLOOD PRESSURE: 85 MMHG | SYSTOLIC BLOOD PRESSURE: 130 MMHG | TEMPERATURE: 98 F | HEIGHT: 70 IN | HEART RATE: 90 BPM | BODY MASS INDEX: 29.62 KG/M2

## 2022-05-18 DIAGNOSIS — F10.20 ALCOHOLISM: Primary | ICD-10-CM

## 2022-05-18 DIAGNOSIS — F11.11 NARCOTIC ABUSE IN REMISSION: ICD-10-CM

## 2022-05-18 PROCEDURE — 99213 OFFICE O/P EST LOW 20 MIN: CPT | Performed by: FAMILY MEDICINE

## 2022-05-20 DIAGNOSIS — F43.22 ADJUSTMENT DISORDER WITH ANXIOUS MOOD: ICD-10-CM

## 2022-05-20 DIAGNOSIS — R11.0 NAUSEA: ICD-10-CM

## 2022-05-20 DIAGNOSIS — M25.552 LEFT HIP PAIN: ICD-10-CM

## 2022-05-20 RX ORDER — LORAZEPAM 0.5 MG/1
0.5 TABLET ORAL NIGHTLY PRN
Qty: 30 TABLET | Refills: 2 | OUTPATIENT
Start: 2022-05-20

## 2022-05-20 RX ORDER — ONDANSETRON 4 MG/1
4 TABLET, ORALLY DISINTEGRATING ORAL EVERY 8 HOURS PRN
Qty: 30 TABLET | Refills: 2 | Status: SHIPPED | OUTPATIENT
Start: 2022-05-20 | End: 2022-11-10 | Stop reason: SDUPTHER

## 2022-05-20 RX ORDER — FLUOXETINE HYDROCHLORIDE 40 MG/1
40 CAPSULE ORAL DAILY
Qty: 90 CAPSULE | Refills: 1 | Status: SHIPPED | OUTPATIENT
Start: 2022-05-20

## 2022-05-20 RX ORDER — PANTOPRAZOLE SODIUM 40 MG/1
40 TABLET, DELAYED RELEASE ORAL DAILY
Qty: 90 TABLET | Refills: 0 | Status: SHIPPED | OUTPATIENT
Start: 2022-05-20 | End: 2022-06-02 | Stop reason: SDUPTHER

## 2022-05-20 RX ORDER — TRAZODONE HYDROCHLORIDE 50 MG/1
50 TABLET ORAL
Qty: 90 TABLET | Refills: 0 | Status: SHIPPED | OUTPATIENT
Start: 2022-05-20 | End: 2022-07-28 | Stop reason: SDUPTHER

## 2022-05-20 RX ORDER — MELOXICAM 15 MG/1
15 TABLET ORAL DAILY
Qty: 30 TABLET | Refills: 2 | Status: SHIPPED | OUTPATIENT
Start: 2022-05-20 | End: 2022-09-12

## 2022-05-20 NOTE — PROGRESS NOTES
"Chief Complaint  Alcohol Problem (Wanting to discuss medication options )    Subjective          Tj Benedict presents to [facility]  History of Present Illness  Patient reports that he is 61 days sober after admitting to excessive drinking and abuse of Lortab medications.  He was recently arrested, he wants to maintain sobriety and would like to discuss options for medicine assistance.    Objective   Vital Signs:  /85 (BP Location: Left arm, Patient Position: Sitting, Cuff Size: Adult)   Pulse 90   Temp 98 °F (36.7 °C) (Temporal)   Ht 177.8 cm (70\")   Wt 93.8 kg (206 lb 14.4 oz)   BMI 29.69 kg/m²         Physical Exam  Vitals and nursing note reviewed.   Constitutional:       General: He is not in acute distress.     Appearance: He is not diaphoretic.   HENT:      Head: Normocephalic and atraumatic.      Nose: Nose normal.   Eyes:      General: No scleral icterus.        Right eye: No discharge.         Left eye: No discharge.      Conjunctiva/sclera: Conjunctivae normal.   Neck:      Trachea: No tracheal deviation.   Pulmonary:      Effort: Pulmonary effort is normal.   Skin:     General: Skin is warm and dry.      Coloration: Skin is not pale.   Neurological:      Mental Status: He is alert and oriented to person, place, and time.   Psychiatric:         Behavior: Behavior normal.         Thought Content: Thought content normal.         Judgment: Judgment normal.        Result Review :                 Assessment and Plan    Diagnoses and all orders for this visit:    1. Alcoholism (HCC) (Primary)    2. 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  He does not feel that this is necessary, I did give him information to look into 2 different Suboxone clinics that are here locally.  Follow-up as " needed

## 2022-05-20 NOTE — TELEPHONE ENCOUNTER
Caller: Jak Tj KEITH    Relationship: Self     Best call back number: [phone]    Requested Prescriptions:   Requested Prescriptions     Pending Prescriptions Disp Refills   • FLUoxetine (PROzac) 40 MG capsule 90 capsule 1     Sig: Take 1 capsule by mouth Daily.   • LORazepam (Ativan) 0.5 MG tablet 30 tablet 2     Sig: Take 1 tablet by mouth At Night As Needed for Anxiety.   • meloxicam (MOBIC) 15 MG tablet 30 tablet 2     Sig: Take 1 tablet by mouth Daily.   • ondansetron ODT (ZOFRAN-ODT) 4 MG disintegrating tablet 30 tablet 2     Sig: Place 1 tablet on the tongue Every 8 (Eight) Hours As Needed for Nausea or Vomiting.   • pantoprazole (PROTONIX) 40 MG EC tablet 90 tablet 0     Sig: Take 1 tablet by mouth Daily.   • traZODone (DESYREL) 50 MG tablet 90 tablet 0     Sig: Take 1 tablet by mouth every night at bedtime.        Pharmacy where request should be sent: [facility]/PHARMACY #[id number] - [place], [place] - [address] AT [address]OSS FROM [address]  [phone] [facility] [phone]      Additional details provided by patient: PATIENT WAS RECENTLY RELEASED FROM INCARCERATION AND NO LONGER HAS ANY MEDICATIONS.     Does the patient have less than a 3 day supply:  [x] Yes  [] No    Colleen Rogers Rep   05/20/22 13:46 CDT

## 2022-06-02 DIAGNOSIS — F43.22 ADJUSTMENT DISORDER WITH ANXIOUS MOOD: ICD-10-CM

## 2022-06-02 NOTE — TELEPHONE ENCOUNTER
Caller: Tj Benedict    Relationship: Self    Best call back number: [phone]  Requested Prescriptions:   Requested Prescriptions     Pending Prescriptions Disp Refills   • LORazepam (Ativan) 0.5 MG tablet 30 tablet 2     Sig: Take 1 tablet by mouth At Night As Needed for Anxiety.   • pantoprazole (PROTONIX) 40 MG EC tablet 90 tablet 0     Sig: Take 1 tablet by mouth Daily.        Pharmacy where request should be sent: [facility]/PHARMACY #[id number] - [place], [place] - [address] AT [address] FROM [address]  [phone] [facility] [phone]      Additional details provided by patient:PATIENT IS COMPLETELY OUT     Does the patient have less than a 3 day supply:  [x] Yes  [] No    Colleen Wray Rep   06/02/22 09:05 CDT

## 2022-06-06 RX ORDER — PANTOPRAZOLE SODIUM 40 MG/1
40 TABLET, DELAYED RELEASE ORAL DAILY
Qty: 90 TABLET | Refills: 0 | Status: SHIPPED | OUTPATIENT
Start: 2022-06-06 | End: 2022-11-10

## 2022-06-06 RX ORDER — LORAZEPAM 0.5 MG/1
0.5 TABLET ORAL NIGHTLY PRN
Qty: 30 TABLET | Refills: 2 | OUTPATIENT
Start: 2022-06-06

## 2022-06-06 NOTE — TELEPHONE ENCOUNTER
Rx Refill Note  Requested Prescriptions     Pending Prescriptions Disp Refills   • LORazepam (Ativan) 0.5 MG tablet 30 tablet 2     Sig: Take 1 tablet by mouth At Night As Needed for Anxiety.   • pantoprazole (PROTONIX) 40 MG EC tablet 90 tablet 0     Sig: Take 1 tablet by mouth Daily.      Last office visit with prescribing clinician: 5/18/2022      Next office visit with prescribing clinician: Visit date not found            TISH Richardson  06/06/22, 14:16 CDT

## 2022-07-01 ENCOUNTER — TELEPHONE (OUTPATIENT)
Dept: FAMILY MEDICINE CLINIC | Facility: CLINIC | Age: 27
End: 2022-07-01

## 2022-07-01 NOTE — TELEPHONE ENCOUNTER
DELETE AFTER REVIEWING: Telephone encounter to be sent to the clinical pool     Caller: Tj Benedict    Relationship to patient: Self    Best call back number: [phone]    Chief complaint: Chest Pain    Patient directed to call 911 or go to their nearest emergency room.     Patient verbalized understanding: [x] Yes  [] No  If no, why?

## 2022-07-28 ENCOUNTER — OFFICE VISIT (OUTPATIENT)
Dept: FAMILY MEDICINE CLINIC | Facility: CLINIC | Age: 27
End: 2022-07-28

## 2022-07-28 VITALS
HEIGHT: 70 IN | TEMPERATURE: 97.1 F | OXYGEN SATURATION: 99 % | WEIGHT: 224 LBS | BODY MASS INDEX: 32.07 KG/M2 | DIASTOLIC BLOOD PRESSURE: 88 MMHG | SYSTOLIC BLOOD PRESSURE: 130 MMHG | HEART RATE: 90 BPM

## 2022-07-28 DIAGNOSIS — F32.A DEPRESSION, UNSPECIFIED DEPRESSION TYPE: ICD-10-CM

## 2022-07-28 PROCEDURE — 99214 OFFICE O/P EST MOD 30 MIN: CPT | Performed by: FAMILY MEDICINE

## 2022-07-28 RX ORDER — BUPRENORPHINE HYDROCHLORIDE AND NALOXONE HYDROCHLORIDE DIHYDRATE 8; 2 MG/1; MG/1
TABLET SUBLINGUAL
COMMUNITY
Start: 2022-07-25

## 2022-07-28 RX ORDER — NALOXONE HYDROCHLORIDE 4 MG/.1ML
SPRAY NASAL
COMMUNITY
Start: 2022-05-20

## 2022-07-28 RX ORDER — TRAZODONE HYDROCHLORIDE 100 MG/1
100 TABLET ORAL
Qty: 90 TABLET | Refills: 1 | Status: SHIPPED | OUTPATIENT
Start: 2022-07-28

## 2022-07-28 RX ORDER — BUPROPION HYDROCHLORIDE 150 MG/1
150 TABLET ORAL EVERY MORNING
Qty: 90 TABLET | Refills: 0 | Status: SHIPPED | OUTPATIENT
Start: 2022-07-28 | End: 2022-10-24

## 2022-07-30 NOTE — PROGRESS NOTES
"Chief Complaint  Follow up mood    Subjective        Tj Benedict presents to [facility]  History of Present Illness  Having more irritability despite compliance with prozac, feels depressed at times, occasional anxiety. Trazodone 50 mg not as effective for sleep    Objective   Vital Signs:  /88 (BP Location: Left arm, Patient Position: Sitting, Cuff Size: Adult)   Pulse 90   Temp 97.1 °F (36.2 °C) (Temporal)   Ht 177.8 cm (70\")   Wt 102 kg (224 lb)   SpO2 99%   BMI 32.14 kg/m²   Estimated body mass index is 32.14 kg/m² as calculated from the following:    Height as of this encounter: 177.8 cm (70\").    Weight as of this encounter: 102 kg (224 lb).          Physical Exam  Vitals and nursing note reviewed.   Constitutional:       General: He is not in acute distress.     Appearance: He is not diaphoretic.   HENT:      Head: Normocephalic and atraumatic.      Nose: Nose normal.   Eyes:      General: No scleral icterus.        Right eye: No discharge.         Left eye: No discharge.      Conjunctiva/sclera: Conjunctivae normal.   Neck:      Trachea: No tracheal deviation.   Pulmonary:      Effort: Pulmonary effort is normal.   Skin:     General: Skin is warm and dry.      Coloration: Skin is not pale.   Neurological:      Mental Status: He is alert and oriented to person, place, and time.   Psychiatric:         Behavior: Behavior normal.         Thought Content: Thought content normal.         Judgment: Judgment normal.        Result Review :                Assessment and Plan   Diagnoses and all orders for this visit:    1. Depression, unspecified depression type  -     traZODone (DESYREL) 100 MG tablet; Take 1 tablet by mouth every night at bedtime.  Dispense: 90 tablet; Refill: 1  -     buPROPion XL (Wellbutrin XL) 150 MG 24 hr tablet; Take 1 tablet by mouth Every Morning.  Dispense: 90 tablet; Refill: 0             Follow Up   Return in about 3 months (around " 10/28/2022).  Patient was given instructions and counseling regarding his condition or for health maintenance advice. Please see specific information pulled into the AVS if appropriate.

## 2022-09-10 DIAGNOSIS — M25.552 LEFT HIP PAIN: ICD-10-CM

## 2022-09-12 RX ORDER — MELOXICAM 15 MG/1
TABLET ORAL
Qty: 30 TABLET | Refills: 2 | Status: SHIPPED | OUTPATIENT
Start: 2022-09-12 | End: 2022-12-21

## 2022-09-12 NOTE — TELEPHONE ENCOUNTER
Rx Refill Note  Requested Prescriptions     Pending Prescriptions Disp Refills   • meloxicam (MOBIC) 15 MG tablet [Pharmacy Med Name: MELOXICAM 15 MG TABLET] 30 tablet 2     Sig: TAKE 1 TABLET BY MOUTH EVERY DAY      Last office visit with prescribing clinician: 7/28/2022      Next office visit with prescribing clinician: Visit date not found            Vonda Ellison DEYSI  09/12/22, 13:44 CDT

## 2022-10-03 ENCOUNTER — HOSPITAL ENCOUNTER (EMERGENCY)
Facility: HOSPITAL | Age: 27
Discharge: HOME | End: 2022-10-03
Admitting: STUDENT IN AN ORGANIZED HEALTH CARE EDUCATION/TRAINING PROGRAM

## 2022-10-03 VITALS
BODY MASS INDEX: 32.64 KG/M2 | OXYGEN SATURATION: 97 % | TEMPERATURE: 98.2 F | HEART RATE: 85 BPM | WEIGHT: 228 LBS | DIASTOLIC BLOOD PRESSURE: 85 MMHG | HEIGHT: 70 IN | RESPIRATION RATE: 16 BRPM | SYSTOLIC BLOOD PRESSURE: 141 MMHG

## 2022-10-03 DIAGNOSIS — Z20.822 ENCOUNTER FOR LABORATORY TESTING FOR COVID-19 VIRUS: Primary | ICD-10-CM

## 2022-10-03 LAB — SARS-COV-2 RNA PNL SPEC NAA+PROBE: NOT DETECTED

## 2022-10-03 PROCEDURE — 87635 SARS-COV-2 COVID-19 AMP PRB: CPT | Performed by: NURSE PRACTITIONER

## 2022-10-03 PROCEDURE — 99283 EMERGENCY DEPT VISIT LOW MDM: CPT

## 2022-10-03 PROCEDURE — C9803 HOPD COVID-19 SPEC COLLECT: HCPCS | Performed by: NURSE PRACTITIONER

## 2022-10-03 NOTE — ED PROVIDER NOTES
Subjective   History of Present Illness  27 yom with PMH of GERD and chronic back pain presents requesting a covid test.  He denies any fever, cough, SOB, CP or other symptom.  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 He needs a covid test.  He declines any other testing. He is asymptomatic.        Review of Systems   Constitutional: Negative for activity change, appetite change, fatigue and fever.   HENT: Negative for congestion, ear pain, facial swelling and sore throat.    Eyes: Negative for discharge and visual disturbance.   Respiratory: Negative for apnea, chest tightness, shortness of breath, wheezing and stridor.    Cardiovascular: Negative for chest pain and palpitations.   Gastrointestinal: Negative for abdominal distention, abdominal pain, diarrhea, nausea and vomiting.   Genitourinary: Negative for difficulty urinating and dysuria.   Musculoskeletal: Negative for arthralgias and myalgias.   Skin: Negative for rash and wound.   Neurological: Negative for dizziness and seizures.   Psychiatric/Behavioral: Negative for agitation and confusion.       Past Medical History:   Diagnosis Date   • Chronic back pain    • GERD (gastroesophageal reflux disease)    • Patient denies medical problems        Allergies   Allergen Reactions   • Flavoring Agent Hives     Breaks out in hives    Carrots       Past Surgical History:   Procedure Laterality Date   • NO PAST SURGERIES     • TONSILLECTOMY     • WISDOM TOOTH EXTRACTION         No family history on file.    Social History     Socioeconomic History   • Marital status:    Tobacco Use   • Smoking status: Current Every Day Smoker     Packs/day: 1.00   • Smokeless tobacco: Never Used   Vaping Use   • Vaping Use: Never used   Substance and Sexual Activity   • Alcohol use: Yes     Comment: occ   • Drug use: No   • Sexual activity: Defer           Objective   Physical Exam  Vitals and nursing note reviewed.   Constitutional:        General: He is not in acute distress.     Appearance: He is well-developed. He is not ill-appearing or toxic-appearing.   HENT:      Head: Normocephalic.      Mouth/Throat:      Mouth: Mucous membranes are moist.   Eyes:      Pupils: Pupils are equal, round, and reactive to light.   Cardiovascular:      Rate and Rhythm: Normal rate and regular rhythm.      Heart sounds: No murmur heard.  Pulmonary:      Effort: Pulmonary effort is normal.      Breath sounds: Normal breath sounds.   Abdominal:      General: Bowel sounds are normal.      Palpations: Abdomen is soft.   Musculoskeletal:         General: Normal range of motion.      Cervical back: Normal range of motion and neck supple.   Skin:     General: Skin is warm and dry.   Neurological:      Mental Status: He is alert and oriented to person, place, and time.         Procedures           ED Course  ED Course as of 10/03/22 1604   Mon Oct 03, 2022   1445 I discussed plan of care with the patient.  He denies any symptoms.  He states he would like to be dc'd prior to receiving his results which I think is reasonable.  He voiced understanding of instructions. [KS]   1600 The patient was contacted for his testing results.  He voiced understanding of neg results [KS]      ED Course User Index  [KS] Bennie Ricketts, APRN                                           MDM  Number of Diagnoses or Management Options  Encounter for laboratory testing for COVID-19 virus: minor     Amount and/or Complexity of Data Reviewed  Clinical lab tests: ordered    Risk of Complications, Morbidity, and/or Mortality  Presenting problems: minimal  Diagnostic procedures: minimal  Management options: minimal    Patient Progress  Patient progress: stable      Final diagnoses:   Encounter for laboratory testing for COVID-19 virus       ED Disposition  ED Disposition     ED Disposition   Discharge    Condition   Stable    Comment   --             Masoud Quarles, DO  [address] [facility]  [address]  [facility] [address]  [phone]    Call in 1 day  Routine ED follow up         Medication List      No changes were made to your prescriptions during this visit.          Bennie Ricketts, APRN  10/03/22 1602

## 2022-10-03 NOTE — DISCHARGE INSTRUCTIONS
Drink plenty of fluid.  Tylenol or motrin as needed for pain/fever. We will call you with results.  You can check my chart for results as well. Follow up with PCP - call tomorrow for appointment. Return to ED if condition does not improve or worsens

## 2022-10-23 DIAGNOSIS — F32.A DEPRESSION, UNSPECIFIED DEPRESSION TYPE: ICD-10-CM

## 2022-10-24 RX ORDER — BUPROPION HYDROCHLORIDE 150 MG/1
TABLET ORAL
Qty: 90 TABLET | Refills: 0 | Status: SHIPPED | OUTPATIENT
Start: 2022-10-24 | End: 2022-12-06

## 2022-11-10 ENCOUNTER — OFFICE VISIT (OUTPATIENT)
Dept: FAMILY MEDICINE CLINIC | Facility: CLINIC | Age: 27
End: 2022-11-10

## 2022-11-10 VITALS
DIASTOLIC BLOOD PRESSURE: 80 MMHG | TEMPERATURE: 97.4 F | WEIGHT: 243 LBS | HEIGHT: 70 IN | BODY MASS INDEX: 34.79 KG/M2 | OXYGEN SATURATION: 96 % | HEART RATE: 79 BPM | SYSTOLIC BLOOD PRESSURE: 140 MMHG

## 2022-11-10 DIAGNOSIS — R11.0 NAUSEA: ICD-10-CM

## 2022-11-10 DIAGNOSIS — E66.09 CLASS 1 OBESITY DUE TO EXCESS CALORIES WITH SERIOUS COMORBIDITY AND BODY MASS INDEX (BMI) OF 34.0 TO 34.9 IN ADULT: Primary | ICD-10-CM

## 2022-11-10 DIAGNOSIS — R03.0 ELEVATED BLOOD PRESSURE READING: ICD-10-CM

## 2022-11-10 LAB
EXPIRATION DATE: NORMAL
HBA1C MFR BLD: 5 %
Lab: NORMAL

## 2022-11-10 PROCEDURE — 99214 OFFICE O/P EST MOD 30 MIN: CPT | Performed by: FAMILY MEDICINE

## 2022-11-10 PROCEDURE — 3044F HG A1C LEVEL LT 7.0%: CPT | Performed by: FAMILY MEDICINE

## 2022-11-10 PROCEDURE — 83036 HEMOGLOBIN GLYCOSYLATED A1C: CPT | Performed by: FAMILY MEDICINE

## 2022-11-10 RX ORDER — PHENTERMINE HYDROCHLORIDE 37.5 MG/1
37.5 CAPSULE ORAL EVERY MORNING
Qty: 30 CAPSULE | Refills: 2 | Status: SHIPPED | OUTPATIENT
Start: 2022-11-10

## 2022-11-10 RX ORDER — ONDANSETRON 4 MG/1
4 TABLET, ORALLY DISINTEGRATING ORAL EVERY 8 HOURS PRN
Qty: 30 TABLET | Refills: 2 | Status: SHIPPED | OUTPATIENT
Start: 2022-11-10

## 2022-11-10 RX ORDER — PANTOPRAZOLE SODIUM 40 MG/1
TABLET, DELAYED RELEASE ORAL
Qty: 90 TABLET | Refills: 0 | Status: SHIPPED | OUTPATIENT
Start: 2022-11-10 | End: 2022-12-06

## 2022-11-10 NOTE — PROGRESS NOTES
"Chief Complaint  Weight Loss and Med Refill (Zofran)    Subjective        Tj Benedict presents to [facility]     History of Present Illness    Weight gain  The patient inquires if there is anything to help him with weight loss. He states that he has been working out constantly, been clean from drugs for 9 months, and working on roofs; however, he states that he still continues to gain weight. He notes that it has been depressing for him. He states that he did not like taking pain killers. He reports that he is up to 244 pounds, which is abnormally high.    Needs refill of Zofran for as needed nausea    Foot pain  The patient states that he believes he broke his toes. He denies having an x-ray performed. He notes that he was offered pain medication; however, he notes that he does not want to take it. He mentions that he will do something about it if he believes that it is becoming worse. He states that he just wrapped his foot and bought a cast from [email or web address], which seems to be effective.    Social History  The patient states that he is still on probation and attending outpatient rehabilitation, so he does not prefer taking over-the-counter medications such as water pills or diet pills. He states that he has been doing pretty good and staying clean and sober. 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      Objective   Vital Signs:  /80 (BP Location: Left arm, Patient Position: Sitting, Cuff Size: Adult)   Pulse 79   Temp 97.4 °F (36.3 °C) (Temporal)   Ht 177.8 cm (70\")   Wt 110 kg (243 lb)   SpO2 96%   BMI 34.87 kg/m²   Estimated body mass index is 34.87 kg/m² as calculated from the following:    Height as of this encounter: 177.8 cm (70\").    Weight as of this encounter: 110 kg (243 lb).          Physical Exam  Vitals and nursing note reviewed.   Constitutional:       General: He is not in acute distress.     Appearance: He is not diaphoretic. "   HENT:      Head: Normocephalic and atraumatic.      Nose: Nose normal.   Eyes:      General: No scleral icterus.        Right eye: No discharge.         Left eye: No discharge.      Conjunctiva/sclera: Conjunctivae normal.   Neck:      Trachea: No tracheal deviation.   Pulmonary:      Effort: Pulmonary effort is normal.   Musculoskeletal:      Comments: Foot is in brace, using cane in right hand for ambulation   Skin:     General: Skin is warm and dry.      Coloration: Skin is not pale.   Neurological:      Mental Status: He is alert and oriented to person, place, and time.   Psychiatric:         Behavior: Behavior normal.         Thought Content: Thought content normal.         Judgment: Judgment normal.        Result Review :                Assessment and Plan   Diagnoses and all orders for this visit:    1. Class 1 obesity due to excess calories with serious comorbidity and body mass index (BMI) of 34.0 to 34.9 in adult (Primary)  -     POC Glycosylated Hemoglobin (Hb A1C)  -     phentermine 37.5 MG capsule; Take 1 capsule by mouth Every Morning.  Dispense: 30 capsule; Refill: 2    2. Elevated blood pressure reading    3. Nausea  -     ondansetron ODT (ZOFRAN-ODT) 4 MG disintegrating tablet; Place 1 tablet on the tongue Every 8 (Eight) Hours As Needed for Nausea or Vomiting.  Dispense: 30 tablet; Refill: 2      1. Weight gain  - The patient's A1c is 5.0 percent today.  - We will add phentermine and have him follow up in 4 weeks.    2. Hypertension  - Blood pressure is mildly elevated today, home BP monitoring, readdress at f/u        Transcribed from ambient dictation for Masoud Quarles DO by Renuka Jones.  11/10/22   10:51 CST    Patient or patient representative verbalized consent to the visit recording.  I have personally performed the services described in this document as transcribed by the above individual, and it is both accurate and complete.

## 2022-11-10 NOTE — PROGRESS NOTES
"Chief Complaint  Weight Loss and Med Refill (Zofran)    Subjective    {Problem List  Visit Diagnosis   Encounters  Notes  Medications  Labs  Result Review Imaging  Media :23}    Tj Benedict presents to [facility]  History of Present Illness    Objective   Vital Signs:  /80 (BP Location: Left arm, Patient Position: Sitting, Cuff Size: Adult)   Pulse 79   Temp 97.4 °F (36.3 °C) (Temporal)   Ht 177.8 cm (70\")   Wt 110 kg (243 lb)   SpO2 96%   BMI 34.87 kg/m²   Estimated body mass index is 34.87 kg/m² as calculated from the following:    Height as of this encounter: 177.8 cm (70\").    Weight as of this encounter: 110 kg (243 lb).    {BMI is >= 30 and <35. (Class 1 Obesity). The following options were offered after discussion; (Optional):94685}      Physical Exam   Result Review :{Labs  Result Review  Imaging  Med Tab  Media  Procedures  :23}  {The following data was reviewed by (Optional):40876}  {Ambulatory Labs (Optional):93670}  {Data reviewed (Optional):14989:::1}          Assessment and Plan {CC Problem List  Visit Diagnosis   ROS  Review (Popup)  Health Maintenance  Quality  BestPractice  Medications  SmartSets  SnapShot Encounters  Media :23}  Diagnoses and all orders for this visit:    1. Class 1 obesity due to excess calories with serious comorbidity and body mass index (BMI) of 34.0 to 34.9 in adult (Primary)  -     POC Glycosylated Hemoglobin (Hb A1C)  -     phentermine 37.5 MG capsule; Take 1 capsule by mouth Every Morning.  Dispense: 30 capsule; Refill: 2    2. Elevated blood pressure reading    3. Nausea  -     ondansetron ODT (ZOFRAN-ODT) 4 MG disintegrating tablet; Place 1 tablet on the tongue Every 8 (Eight) Hours As Needed for Nausea or Vomiting.  Dispense: 30 tablet; Refill: 2           {Time Spent (Optional):14536}  Follow Up {Instructions Charge Capture  Follow-up Communications :23}  No follow-ups on file.  Patient was " given instructions and counseling regarding his condition or for health maintenance advice. Please see specific information pulled into the AVS if appropriate.

## 2022-11-23 DIAGNOSIS — F43.22 ADJUSTMENT DISORDER WITH ANXIOUS MOOD: ICD-10-CM

## 2022-11-23 RX ORDER — TRAZODONE HYDROCHLORIDE 100 MG/1
100 TABLET ORAL
Qty: 90 TABLET | Refills: 1 | Status: SHIPPED | OUTPATIENT
Start: 2022-11-23

## 2022-12-05 DIAGNOSIS — F32.A DEPRESSION, UNSPECIFIED DEPRESSION TYPE: ICD-10-CM

## 2022-12-06 RX ORDER — BUPROPION HYDROCHLORIDE 150 MG/1
TABLET ORAL
Qty: 90 TABLET | Refills: 0 | Status: SHIPPED | OUTPATIENT
Start: 2022-12-06

## 2022-12-06 RX ORDER — PANTOPRAZOLE SODIUM 40 MG/1
TABLET, DELAYED RELEASE ORAL
Qty: 90 TABLET | Refills: 0 | Status: SHIPPED | OUTPATIENT
Start: 2022-12-06

## 2022-12-17 DIAGNOSIS — M25.552 LEFT HIP PAIN: ICD-10-CM

## 2022-12-20 NOTE — TELEPHONE ENCOUNTER
Rx Refill Note  Requested Prescriptions     Pending Prescriptions Disp Refills   • meloxicam (MOBIC) 15 MG tablet [Pharmacy Med Name: MELOXICAM 15 MG TABLET] 30 tablet 2     Sig: TAKE 1 TABLET BY MOUTH EVERY DAY      Last office visit with prescribing clinician: 11/10/2022   Last telemedicine visit with prescribing clinician: Visit date not found   Next office visit with prescribing clinician: Visit date not found                         Would you like a call back once the refill request has been completed: [] Yes [] No    If the office needs to give you a call back, can they leave a voicemail: [] Yes [] No    TISH Richardson  12/20/22, 16:36 CST

## 2022-12-21 RX ORDER — MELOXICAM 15 MG/1
TABLET ORAL
Qty: 90 TABLET | Refills: 1 | Status: SHIPPED | OUTPATIENT
Start: 2022-12-21

## 2023-01-03 NOTE — TELEPHONE ENCOUNTER
Caller: Tj Benedict    Relationship to patient: Self    Best call back number:  [phone]    Patient is needing:     Patient would like to know if there will a test performed at upcoming appt to check sperm count ? Please advise.    See attestation.

## 2023-02-12 DIAGNOSIS — E66.09 CLASS 1 OBESITY DUE TO EXCESS CALORIES WITH SERIOUS COMORBIDITY AND BODY MASS INDEX (BMI) OF 34.0 TO 34.9 IN ADULT: ICD-10-CM

## 2023-02-13 NOTE — TELEPHONE ENCOUNTER
No follow up appointment scheduled. Last office visit 11/10/22, canceled last appoint 12/12/22.    Last refill 11/10/22, qty 30, 2 refills. Please advise

## 2023-02-14 RX ORDER — PHENTERMINE HYDROCHLORIDE 37.5 MG/1
CAPSULE ORAL
Qty: 30 CAPSULE | Refills: 2 | OUTPATIENT
Start: 2023-02-14

## 2023-04-20 DIAGNOSIS — F32.A DEPRESSION, UNSPECIFIED DEPRESSION TYPE: ICD-10-CM

## 2023-04-20 RX ORDER — BUPROPION HYDROCHLORIDE 150 MG/1
TABLET ORAL
Qty: 90 TABLET | Refills: 0 | OUTPATIENT
Start: 2023-04-20

## 2023-04-20 NOTE — TELEPHONE ENCOUNTER
Rx Refill Note  Requested Prescriptions     Pending Prescriptions Disp Refills   • buPROPion XL (WELLBUTRIN XL) 150 MG 24 hr tablet [Pharmacy Med Name: BUPROPION HCL  MG TABLET] 90 tablet 0     Sig: TAKE 1 TABLET BY MOUTH EVERY DAY IN THE MORNING      Last office visit with prescribing clinician: 11/10/2022   Last telemedicine visit with prescribing clinician: Visit date not found   Next office visit with prescribing clinician: Visit date not found                         Would you like a call back once the refill request has been completed: [] Yes [] No    If the office needs to give you a call back, can they leave a voicemail: [] Yes [] No    TISH Richardson  04/20/23, 07:35 CDT

## 2023-04-27 ENCOUNTER — APPOINTMENT (OUTPATIENT)
Dept: CT IMAGING | Facility: HOSPITAL | Age: 28
End: 2023-04-27
Payer: COMMERCIAL

## 2023-04-27 ENCOUNTER — HOSPITAL ENCOUNTER (EMERGENCY)
Facility: HOSPITAL | Age: 28
Discharge: HOME | End: 2023-04-28
Attending: EMERGENCY MEDICINE
Payer: COMMERCIAL

## 2023-04-27 DIAGNOSIS — M54.50 ACUTE BILATERAL LOW BACK PAIN WITHOUT SCIATICA: ICD-10-CM

## 2023-04-27 DIAGNOSIS — R10.30 LOWER ABDOMINAL PAIN: Primary | ICD-10-CM

## 2023-04-27 DIAGNOSIS — R11.10 VOMITING, UNSPECIFIED VOMITING TYPE, UNSPECIFIED WHETHER NAUSEA PRESENT: ICD-10-CM

## 2023-04-27 LAB
ALBUMIN SERPL-MCNC: 4.5 G/DL (ref 3.5–5.2)
ALBUMIN/GLOB SERPL: 1.5 G/DL
ALP SERPL-CCNC: 116 U/L (ref 39–117)
ALT SERPL W P-5'-P-CCNC: 21 U/L (ref 1–41)
ANION GAP SERPL CALCULATED.3IONS-SCNC: 13 MMOL/L (ref 5–15)
AST SERPL-CCNC: 23 U/L (ref 1–40)
BACTERIA UR QL AUTO: ABNORMAL /HPF
BASOPHILS # BLD AUTO: 0.05 10*3/MM3 (ref 0–0.2)
BASOPHILS NFR BLD AUTO: 0.5 % (ref 0–1.5)
BILIRUB SERPL-MCNC: 0.5 MG/DL (ref 0–1.2)
BILIRUB UR QL STRIP: ABNORMAL
BUN SERPL-MCNC: 10 MG/DL (ref 6–20)
BUN/CREAT SERPL: 11.2 (ref 7–25)
CALCIUM SPEC-SCNC: 9.8 MG/DL (ref 8.6–10.5)
CHLORIDE SERPL-SCNC: 100 MMOL/L (ref 98–107)
CLARITY UR: CLEAR
CO2 SERPL-SCNC: 24 MMOL/L (ref 22–29)
COLOR UR: ABNORMAL
CREAT SERPL-MCNC: 0.89 MG/DL (ref 0.76–1.27)
DEPRECATED RDW RBC AUTO: 49.8 FL (ref 37–54)
EGFRCR SERPLBLD CKD-EPI 2021: 119.7 ML/MIN/1.73
EOSINOPHIL # BLD AUTO: 0.16 10*3/MM3 (ref 0–0.4)
EOSINOPHIL NFR BLD AUTO: 1.6 % (ref 0.3–6.2)
ERYTHROCYTE [DISTWIDTH] IN BLOOD BY AUTOMATED COUNT: 13.9 % (ref 12.3–15.4)
GLOBULIN UR ELPH-MCNC: 3 GM/DL
GLUCOSE SERPL-MCNC: 97 MG/DL (ref 65–99)
GLUCOSE UR STRIP-MCNC: NEGATIVE MG/DL
GRAN CASTS URNS QL MICRO: ABNORMAL /LPF
HCT VFR BLD AUTO: 51.8 % (ref 37.5–51)
HGB BLD-MCNC: 16.5 G/DL (ref 13–17.7)
HGB UR QL STRIP.AUTO: NEGATIVE
HYALINE CASTS UR QL AUTO: ABNORMAL /LPF
IMM GRANULOCYTES # BLD AUTO: 0.04 10*3/MM3 (ref 0–0.05)
IMM GRANULOCYTES NFR BLD AUTO: 0.4 % (ref 0–0.5)
KETONES UR QL STRIP: ABNORMAL
LEUKOCYTE ESTERASE UR QL STRIP.AUTO: ABNORMAL
LIPASE SERPL-CCNC: 16 U/L (ref 13–60)
LYMPHOCYTES # BLD AUTO: 1.72 10*3/MM3 (ref 0.7–3.1)
LYMPHOCYTES NFR BLD AUTO: 17.2 % (ref 19.6–45.3)
MCH RBC QN AUTO: 30.9 PG (ref 26.6–33)
MCHC RBC AUTO-ENTMCNC: 31.9 G/DL (ref 31.5–35.7)
MCV RBC AUTO: 97 FL (ref 79–97)
MONOCYTES # BLD AUTO: 0.53 10*3/MM3 (ref 0.1–0.9)
MONOCYTES NFR BLD AUTO: 5.3 % (ref 5–12)
MUCOUS THREADS URNS QL MICRO: ABNORMAL /HPF
NEUTROPHILS NFR BLD AUTO: 7.49 10*3/MM3 (ref 1.7–7)
NEUTROPHILS NFR BLD AUTO: 75 % (ref 42.7–76)
NITRITE UR QL STRIP: NEGATIVE
NRBC BLD AUTO-RTO: 0 /100 WBC (ref 0–0.2)
PH UR STRIP.AUTO: 5.5 [PH] (ref 5–8)
PLATELET # BLD AUTO: 300 10*3/MM3 (ref 140–450)
PMV BLD AUTO: 8.7 FL (ref 6–12)
POTASSIUM SERPL-SCNC: 4 MMOL/L (ref 3.5–5.2)
PROT SERPL-MCNC: 7.5 G/DL (ref 6–8.5)
PROT UR QL STRIP: ABNORMAL
RBC # BLD AUTO: 5.34 10*6/MM3 (ref 4.14–5.8)
RBC # UR STRIP: ABNORMAL /HPF
REF LAB TEST METHOD: ABNORMAL
SODIUM SERPL-SCNC: 137 MMOL/L (ref 136–145)
SP GR UR STRIP: >1.03 (ref 1–1.03)
SQUAMOUS #/AREA URNS HPF: ABNORMAL /HPF
UROBILINOGEN UR QL STRIP: ABNORMAL
WBC # UR STRIP: ABNORMAL /HPF
WBC NRBC COR # BLD: 9.99 10*3/MM3 (ref 3.4–10.8)

## 2023-04-27 PROCEDURE — 85025 COMPLETE CBC W/AUTO DIFF WBC: CPT | Performed by: EMERGENCY MEDICINE

## 2023-04-27 PROCEDURE — 96372 THER/PROPH/DIAG INJ SC/IM: CPT

## 2023-04-27 PROCEDURE — 81001 URINALYSIS AUTO W/SCOPE: CPT | Performed by: EMERGENCY MEDICINE

## 2023-04-27 PROCEDURE — 25010000002 PROCHLORPERAZINE 10 MG/2ML SOLUTION: Performed by: EMERGENCY MEDICINE

## 2023-04-27 PROCEDURE — 25010000002 KETOROLAC TROMETHAMINE PER 15 MG: Performed by: EMERGENCY MEDICINE

## 2023-04-27 PROCEDURE — 74177 CT ABD & PELVIS W/CONTRAST: CPT

## 2023-04-27 PROCEDURE — 80053 COMPREHEN METABOLIC PANEL: CPT | Performed by: EMERGENCY MEDICINE

## 2023-04-27 PROCEDURE — 83690 ASSAY OF LIPASE: CPT | Performed by: EMERGENCY MEDICINE

## 2023-04-27 PROCEDURE — 99283 EMERGENCY DEPT VISIT LOW MDM: CPT

## 2023-04-27 PROCEDURE — 25510000001 IOPAMIDOL 61 % SOLUTION: Performed by: EMERGENCY MEDICINE

## 2023-04-27 RX ORDER — DICYCLOMINE HYDROCHLORIDE 10 MG/ML
20 INJECTION INTRAMUSCULAR ONCE
Status: COMPLETED | OUTPATIENT
Start: 2023-04-27 | End: 2023-04-28

## 2023-04-27 RX ORDER — SODIUM CHLORIDE 0.9 % (FLUSH) 0.9 %
10 SYRINGE (ML) INJECTION AS NEEDED
Status: DISCONTINUED | OUTPATIENT
Start: 2023-04-27 | End: 2023-04-28 | Stop reason: HOSPADM

## 2023-04-27 RX ORDER — PROCHLORPERAZINE EDISYLATE 5 MG/ML
10 INJECTION INTRAMUSCULAR; INTRAVENOUS ONCE
Status: COMPLETED | OUTPATIENT
Start: 2023-04-27 | End: 2023-04-27

## 2023-04-27 RX ORDER — KETOROLAC TROMETHAMINE 30 MG/ML
30 INJECTION, SOLUTION INTRAMUSCULAR; INTRAVENOUS ONCE
Status: COMPLETED | OUTPATIENT
Start: 2023-04-27 | End: 2023-04-27

## 2023-04-27 RX ADMIN — IOPAMIDOL 100 ML: 612 INJECTION, SOLUTION INTRAVENOUS at 23:13

## 2023-04-27 RX ADMIN — KETOROLAC TROMETHAMINE 30 MG: 30 INJECTION, SOLUTION INTRAMUSCULAR; INTRAVENOUS at 20:55

## 2023-04-27 RX ADMIN — PROCHLORPERAZINE EDISYLATE 10 MG: 5 INJECTION INTRAMUSCULAR; INTRAVENOUS at 20:56

## 2023-04-27 RX ADMIN — SODIUM CHLORIDE 1000 ML: 9 INJECTION, SOLUTION INTRAVENOUS at 23:26

## 2023-04-28 VITALS
OXYGEN SATURATION: 98 % | HEIGHT: 70 IN | SYSTOLIC BLOOD PRESSURE: 135 MMHG | RESPIRATION RATE: 20 BRPM | DIASTOLIC BLOOD PRESSURE: 79 MMHG | HEART RATE: 103 BPM | TEMPERATURE: 98.8 F | WEIGHT: 242 LBS | BODY MASS INDEX: 34.65 KG/M2

## 2023-04-28 LAB
HOLD SPECIMEN: NORMAL
WHOLE BLOOD HOLD COAG: NORMAL
WHOLE BLOOD HOLD SPECIMEN: NORMAL

## 2023-04-28 PROCEDURE — 96372 THER/PROPH/DIAG INJ SC/IM: CPT

## 2023-04-28 PROCEDURE — 25010000002 DICYCLOMINE PER 20 MG: Performed by: EMERGENCY MEDICINE

## 2023-04-28 RX ORDER — ONDANSETRON 4 MG/1
4 TABLET, ORALLY DISINTEGRATING ORAL EVERY 8 HOURS PRN
Qty: 15 TABLET | Refills: 0 | Status: SHIPPED | OUTPATIENT
Start: 2023-04-28

## 2023-04-28 RX ORDER — MELOXICAM 15 MG/1
15 TABLET ORAL DAILY
Qty: 10 TABLET | Refills: 0 | Status: SHIPPED | OUTPATIENT
Start: 2023-04-28

## 2023-04-28 RX ORDER — DICYCLOMINE HYDROCHLORIDE 10 MG/1
10 CAPSULE ORAL
Qty: 24 CAPSULE | Refills: 0 | Status: SHIPPED | OUTPATIENT
Start: 2023-04-28

## 2023-04-28 RX ADMIN — DICYCLOMINE HYDROCHLORIDE 20 MG: 20 INJECTION, SOLUTION INTRAMUSCULAR at 00:15

## 2023-04-28 NOTE — ED PROVIDER NOTES
Subjective   History of Present Illness  Barb 7-year-old male presents with reports of a weeks worth of lower back pain patient then states over the last 24 hours he developed lower abdominal pain and difficulty urinating.  He states that he has not had any recent fever, no penile discharge, no hematuria, denies any diarrhea or loose stool.  Denies any bilious emesis or hematemesis.  Concern for possible kidney stone as his uncle has had similar symptoms and was diagnosed with kidney stone.  he does have a history of recurrent bouts of colitis with nausea and vomiting as well      Review of Systems   Constitutional: Negative for fever.   Gastrointestinal: Positive for abdominal pain, nausea and vomiting.   Musculoskeletal: Positive for back pain.   All other systems reviewed and are negative.      Past Medical History:   Diagnosis Date   • Chronic back pain    • GERD (gastroesophageal reflux disease)    • Patient denies medical problems        Allergies   Allergen Reactions   • Flavoring Agent Hives     Breaks out in hives    Carrots       Past Surgical History:   Procedure Laterality Date   • NO PAST SURGERIES     • TONSILLECTOMY     • WISDOM TOOTH EXTRACTION         History reviewed. No pertinent family history.    Social History     Socioeconomic History   • Marital status:    Tobacco Use   • Smoking status: Every Day     Packs/day: 1.00     Types: Cigarettes   • Smokeless tobacco: Never   Vaping Use   • Vaping Use: Never used   Substance and Sexual Activity   • Alcohol use: Yes     Comment: occ   • Drug use: No   • Sexual activity: Defer           Objective   Physical Exam  Vitals and nursing note reviewed.   Constitutional:       Appearance: Normal appearance.   HENT:      Head: Normocephalic.      Nose: Nose normal.      Mouth/Throat:      Mouth: Mucous membranes are moist.   Eyes:      General: No scleral icterus.     Extraocular Movements: Extraocular movements intact.   Cardiovascular:      Rate and  Rhythm: Normal rate and regular rhythm.      Pulses: Normal pulses.   Pulmonary:      Effort: Pulmonary effort is normal.      Breath sounds: Normal breath sounds.   Abdominal:      General: Abdomen is flat.      Palpations: Abdomen is soft.      Tenderness: There is no guarding. Negative signs include Medina's sign and McBurney's sign.       Musculoskeletal:         General: Tenderness present.      Lumbar back: Tenderness present. Negative right straight leg raise test and negative left straight leg raise test.   Skin:     General: Skin is warm.      Capillary Refill: Capillary refill takes less than 2 seconds.   Neurological:      General: No focal deficit present.      Mental Status: He is alert.         Procedures           ED Course                                           Medical Decision Making  Acute bilateral low back pain without sciatica: acute illness or injury  Lower abdominal pain: acute illness or injury  Vomiting, unspecified vomiting type, unspecified whether nausea present: acute illness or injury  Risk  OTC drugs.  Prescription drug management.    Risk Details: Abdominal CT not showing any acute findings.  Abdomen is otherwise benign to palpation.  He has no clinical findings or history to suggest renal stone.  He adamantly denies any possibility of STI.  Patient was given nausea medication and IV fluids in the department with improvement of symptoms low back pain abdominal pain do not appear to be correlated back pain appears to be possibly musculoskeletal in nature.  He will follow-up with PMD otherwise return to the ER if worse.        Final diagnoses:   Lower abdominal pain   Vomiting, unspecified vomiting type, unspecified whether nausea present   Acute bilateral low back pain without sciatica       ED Disposition  ED Disposition     ED Disposition   Discharge    Condition   Stable    Comment   --             Masoud Quarles, DO  [address]  [address]  [facility]  [address]  [phone]    Schedule an appointment as soon as possible for a visit       [facility] Emergency Department  [facility] [facility] [address] [phone]    As needed, If symptoms worsen         Medication List      New Prescriptions    dicyclomine 10 MG capsule  Commonly known as: BENTYL  Take 1 capsule by mouth 4 (Four) Times a Day Before Meals & at Bedtime.        Changed    * meloxicam 15 MG tablet  Commonly known as: MOBIC  TAKE 1 TABLET BY MOUTH EVERY DAY  What changed: Another medication with the same name was added. Make sure you understand how and when to take each.     * meloxicam 15 MG tablet  Commonly known as: Mobic  Take 1 tablet by mouth Daily.  What changed: You were already taking a medication with the same name, and this prescription was added. Make sure you understand how and when to take each.     * ondansetron ODT 4 MG disintegrating tablet  Commonly known as: ZOFRAN-ODT  Place 1 tablet on the tongue Every 8 (Eight) Hours As Needed for Nausea or Vomiting.  What changed: Another medication with the same name was added. Make sure you understand how and when to take each.     * ondansetron ODT 4 MG disintegrating tablet  Commonly known as: ZOFRAN-ODT  Place 1 tablet on the tongue Every 8 (Eight) Hours As Needed for Nausea or Vomiting.  What changed: You were already taking a medication with the same name, and this prescription was added. Make sure you understand how and when to take each.         * This list has 4 medication(s) that are the same as other medications prescribed for you. Read the directions carefully, and ask your doctor or other care provider to review them with you.               Where to Get Your Medications      These medications were sent to [facility]/pharmacy #[id number] - [place], [place] - [address] AT ACROSS FROM [address] - [phone]  - [phone] FX  [address] [facility] [address]    Phone: [phone]   · dicyclomine 10 MG  capsule  · meloxicam 15 MG tablet  · ondansetron ODT 4 MG disintegrating tablet

## 2023-05-04 RX ORDER — PANTOPRAZOLE SODIUM 40 MG/1
TABLET, DELAYED RELEASE ORAL
Qty: 90 TABLET | Refills: 0 | Status: SHIPPED | OUTPATIENT
Start: 2023-05-04

## 2024-05-12 ENCOUNTER — HOSPITAL ENCOUNTER (EMERGENCY)
Age: 29
Discharge: HOME | End: 2024-05-12
Payer: MEDICAID

## 2024-05-12 VITALS
HEART RATE: 98 BPM | TEMPERATURE: 98.2 F | OXYGEN SATURATION: 98 % | RESPIRATION RATE: 18 BRPM | SYSTOLIC BLOOD PRESSURE: 169 MMHG | DIASTOLIC BLOOD PRESSURE: 90 MMHG
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
## 2024-05-12 DIAGNOSIS — S41.151A DOG BITE OF RIGHT ARM, INITIAL ENCOUNTER: Primary | ICD-10-CM

## 2024-05-12 PROCEDURE — 90471 IMMUNIZATION ADMIN: CPT | Performed by: NURSE PRACTITIONER

## 2024-05-12 PROCEDURE — 99284 EMERGENCY DEPT VISIT MOD MDM: CPT

## 2024-05-12 PROCEDURE — 6360000002 HC RX W HCPCS: Performed by: NURSE PRACTITIONER

## 2024-05-12 PROCEDURE — 90715 TDAP VACCINE 7 YRS/> IM: CPT | Performed by: NURSE PRACTITIONER

## 2024-05-12 RX ORDER — AMOXICILLIN AND CLAVULANATE POTASSIUM 875; 125 MG/1; MG/1
1 TABLET, FILM COATED ORAL 2 TIMES DAILY
Qty: 14 TABLET | Refills: 0 | Status: SHIPPED | OUTPATIENT
Start: 2024-05-12 | End: 2024-05-19

## 2024-05-12 RX ADMIN — TETANUS TOXOID, REDUCED DIPHTHERIA TOXOID AND ACELLULAR PERTUSSIS VACCINE, ADSORBED 0.5 ML: 5; 2.5; 8; 8; 2.5 SUSPENSION INTRAMUSCULAR at 18:46

## 2024-05-12 ASSESSMENT — LIFESTYLE VARIABLES
HOW OFTEN DO YOU HAVE A DRINK CONTAINING ALCOHOL: NEVER
HOW MANY STANDARD DRINKS CONTAINING ALCOHOL DO YOU HAVE ON A TYPICAL DAY: PATIENT DOES NOT DRINK

## 2024-05-12 ASSESSMENT — PAIN SCALES - GENERAL: PAINLEVEL_OUTOF10: 6

## 2024-05-12 ASSESSMENT — PAIN - FUNCTIONAL ASSESSMENT: PAIN_FUNCTIONAL_ASSESSMENT: 0-10

## 2024-05-12 NOTE — DISCHARGE INSTRUCTIONS
Take all of the antibiotic.  Drink plenty of water while taking the antibiotic.    Keep the wounds clean, dry, and covered.  Return to ER for any new, worsening, or change in condition.

## 2024-05-12 NOTE — ED PROVIDER NOTES
[facility] EMERGENCY DEPT  EMERGENCY DEPARTMENT ENCOUNTER      Pt Name: Wojciech Alas  MRN: [id number]  Birthdate 1995  Date of evaluation: 5/12/2024  Provider: HODAN Dent NP    CHIEF COMPLAINT       Chief Complaint   Patient presents with    Animal Bite     Pt presents to ED with c/o dog bite to right forearm. Random dog.          HISTORY OF PRESENT ILLNESS   (Location/Symptom, Timing/Onset,Context/Setting, Quality, Duration, Modifying Factors, Severity)  Note limiting factors.   Wojciech Alas is a 28 y.o. male who presents to the emergency department after being bitten by a stray dog.  He reports the dog was medium sized and was wearing a collar.  He reports 2 lacerations and 1 puncture wound to his right forearm.  His tetanus is not up-to-date.              NursingNotes were reviewed.    REVIEW OF SYSTEMS    (2-9 systems for level 4, 10 or more for level 5)     Review of Systems   Respiratory: Negative.     Cardiovascular: Negative.    Skin:  Positive for wound.   All other systems reviewed and are negative.      A complete review of systems was performed and is negative except as noted above in the HPI.       PAST MEDICAL HISTORY     Past Medical History:   Diagnosis Date    Back pain     Diarrhea     Fracture, pelvis closed (HCC) 01/03/2015    fell off roof    GERD (gastroesophageal reflux disease)     Nausea & vomiting     Rectal bleed     Rib fractures 01/03/2015    fell off roof    Stomach pain          SURGICAL HISTORY       Past Surgical History:   Procedure Laterality Date    COLONOSCOPY N/A 11/19/2020    Dr Brasher, Internal hemorrhoids-Grade 1 wo bleeding stigmata, 14 yr recall      TONSILLECTOMY      UPPER GASTROINTESTINAL ENDOSCOPY N/A 10/8/2020    Dr Brasher-Normal         CURRENT MEDICATIONS       Discharge Medication List as of 5/12/2024  6:47 PM        CONTINUE these medications which have NOT CHANGED    Details   ondansetron (ZOFRAN) 4 MG tablet Take 1 tablet by mouth 3  Mouth/Throat:      Mouth: Mucous membranes are moist.   Eyes:      Pupils: Pupils are equal, round, and reactive to light.   Cardiovascular:      Rate and Rhythm: Normal rate and regular rhythm.   Pulmonary:      Effort: Pulmonary effort is normal.   Musculoskeletal:         General: Normal range of motion.        Arms:       Comments: 2 small superficial lacerations and 1 small puncture wound.  No active bleeding.  Patient has full range of motion of all digits and good flexion and extension of forearm and digits.   Neurological:      Mental Status: He is alert.         DIAGNOSTIC RESULTS     EKG: All EKG's are interpreted by the Emergency Department Physician who either signs or Co-signs this chart in the absence of a cardiologist.        RADIOLOGY:   Non-plain film images such as CT, Ultrasound and MRI are read by the radiologist. Plainradiographic images are visualized and preliminarily interpreted by the emergency physician with the below findings:        Interpretation per the Radiologist below, if available at the time of this note:    No orders to display         ED BEDSIDE ULTRASOUND:   Performed by ED Physician - none    LABS:  Labs Reviewed - No data to display    All other labs were within normal range or not returned as of this dictation.    Medications   Tetanus-Diphth-Acell Pertussis (BOOSTRIX) injection 0.5 mL (0.5 mLs IntraMUSCular Given 5/12/24 1846)       EMERGENCY DEPARTMENT COURSE and DIFFERENTIALDIAGNOSIS/MDM:   Vitals:    Vitals:    05/12/24 1821   BP: (!) 169/90   Pulse: 98   Resp: 18   Temp: 98.2 °F (36.8 °C)   TempSrc: Oral   SpO2: 98%       MDM  Number of Diagnoses or Management Options  Dog bite of right arm, initial encounter  Diagnosis management comments: 28-year-old male presented to the emergency department after being bitten by a stray dog.  The dog was unknown to the patient.  He had 2 small superficial lacerations less than 0.5 cm and 1 small puncture wound to the right forearm.   He had full flexion and extension of fingers and forearm.  His tetanus was updated today.  Wound was thoroughly cleansed with Betadine and saline.  Encourage patient to keep the wound clean dry and covered.  Will prescribe Augmentin and encouraged follow-up for wound recheck.  Patient agreeable to discharge plan.    Patient Progress  Patient progress: stable             CONSULTS:  None    PROCEDURES:  Unless otherwise notedbelow, none     Procedures      FINAL IMPRESSION     1. Dog bite of right arm, initial encounter          DISPOSITION/PLAN   DISPOSITION Decision To Discharge 05/12/2024 06:34:51 PM      No notes of EC Admission Criteria type on file.    PATIENT REFERRED TO:  No follow-up provider specified.    DISCHARGE MEDICATIONS:  Discharge Medication List as of 5/12/2024  6:47 PM        START taking these medications    Details   amoxicillin-clavulanate (AUGMENTIN) 875-125 MG per tablet Take 1 tablet by mouth 2 times daily for 7 days, Disp-14 tablet, R-0Normal                (Please note that portions of this note were completed with a voice recognition program.  Efforts were made to edit the dictations butoccasionally words are mis-transcribed.)    HODAN Dent NP (electronically signed)  AttendingEmergency Physician         Saravanan Giang APRN - NP  05/13/24 0047

## 2024-05-13 ASSESSMENT — ENCOUNTER SYMPTOMS: RESPIRATORY NEGATIVE: 1
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
## 2025-04-16 ENCOUNTER — OFFICE VISIT (OUTPATIENT)
Age: 30
End: 2025-04-16
Payer: MEDICAID

## 2025-04-16 VITALS
OXYGEN SATURATION: 97 % | SYSTOLIC BLOOD PRESSURE: 138 MMHG | HEIGHT: 70 IN | BODY MASS INDEX: 33.79 KG/M2 | HEART RATE: 93 BPM | TEMPERATURE: 98.8 F | DIASTOLIC BLOOD PRESSURE: 90 MMHG | WEIGHT: 236 LBS

## 2025-04-16 DIAGNOSIS — K21.9 GASTROESOPHAGEAL REFLUX DISEASE, UNSPECIFIED WHETHER ESOPHAGITIS PRESENT: ICD-10-CM

## 2025-04-16 DIAGNOSIS — E66.09 CLASS 1 OBESITY DUE TO EXCESS CALORIES WITH BODY MASS INDEX (BMI) OF 33.0 TO 33.9 IN ADULT, UNSPECIFIED WHETHER SERIOUS COMORBIDITY PRESENT: ICD-10-CM

## 2025-04-16 DIAGNOSIS — R11.2 NAUSEA AND VOMITING, UNSPECIFIED VOMITING TYPE: Primary | ICD-10-CM

## 2025-04-16 DIAGNOSIS — E66.811 CLASS 1 OBESITY DUE TO EXCESS CALORIES WITH BODY MASS INDEX (BMI) OF 33.0 TO 33.9 IN ADULT, UNSPECIFIED WHETHER SERIOUS COMORBIDITY PRESENT: ICD-10-CM

## 2025-04-16 DIAGNOSIS — R10.11 RUQ PAIN: ICD-10-CM

## 2025-04-16 PROCEDURE — 99204 OFFICE O/P NEW MOD 45 MIN: CPT | Performed by: FAMILY MEDICINE

## 2025-04-16 PROCEDURE — G8427 DOCREV CUR MEDS BY ELIG CLIN: HCPCS | Performed by: FAMILY MEDICINE

## 2025-04-16 PROCEDURE — G8417 CALC BMI ABV UP PARAM F/U: HCPCS | Performed by: FAMILY MEDICINE

## 2025-04-16 PROCEDURE — 4004F PT TOBACCO SCREEN RCVD TLK: CPT | Performed by: FAMILY MEDICINE

## 2025-04-16 RX ORDER — PANTOPRAZOLE SODIUM 40 MG/1
40 TABLET, DELAYED RELEASE ORAL
Qty: 180 TABLET | Refills: 1 | Status: SHIPPED | OUTPATIENT
Start: 2025-04-16

## 2025-04-16 RX ORDER — ONDANSETRON 4 MG/1
4 TABLET, ORALLY DISINTEGRATING ORAL EVERY 8 HOURS PRN
Qty: 30 TABLET | Refills: 2 | Status: SHIPPED | OUTPATIENT
Start: 2025-04-16

## 2025-04-16 RX ORDER — BUPRENORPHINE HYDROCHLORIDE AND NALOXONE HYDROCHLORIDE DIHYDRATE 8; 2 MG/1; MG/1
1 TABLET SUBLINGUAL DAILY
COMMUNITY
Start: 2025-03-25

## 2025-04-16 SDOH — ECONOMIC STABILITY: FOOD INSECURITY: WITHIN THE PAST 12 MONTHS, THE FOOD YOU BOUGHT JUST DIDN'T LAST AND YOU DIDN'T HAVE MONEY TO GET MORE.: NEVER TRUE

## 2025-04-16 SDOH — ECONOMIC STABILITY: FOOD INSECURITY: WITHIN THE PAST 12 MONTHS, YOU WORRIED THAT YOUR FOOD WOULD RUN OUT BEFORE YOU GOT MONEY TO BUY MORE.: NEVER TRUE

## 2025-04-16 ASSESSMENT — PATIENT HEALTH QUESTIONNAIRE - PHQ9
SUM OF ALL RESPONSES TO PHQ QUESTIONS 1-9: 0
1. LITTLE INTEREST OR PLEASURE IN DOING THINGS: NOT AT ALL
SUM OF ALL RESPONSES TO PHQ QUESTIONS 1-9: 0
SUM OF ALL RESPONSES TO PHQ QUESTIONS 1-9: 0
2. FEELING DOWN, DEPRESSED OR HOPELESS: NOT AT ALL
SUM OF ALL RESPONSES TO PHQ QUESTIONS 1-9: 0

## 2025-04-16 NOTE — PROGRESS NOTES
cervical adenopathy.   Skin:     General: Skin is warm and dry.      Findings: No erythema or rash.   Neurological:      Mental Status: He is alert and oriented to person, place, and time.      Cranial Nerves: No cranial nerve deficit.   Psychiatric:         Mood and Affect: Mood normal.         Behavior: Behavior normal.         Thought Content: Thought content normal.               The patient (or guardian, if applicable) and other individuals in attendance with the patient were advised that Artificial Intelligence will be utilized during this visit to record, process the conversation to generate a clinical note and to support improvement of the AI technology. The patient (or guardian, if applicable) and other individuals in attendance at the appointment consented to the use of AI, including the recording.      An electronic signature was used to authenticate this note.    --PONCHO KHOURY MD

## 2025-04-26 ASSESSMENT — ENCOUNTER SYMPTOMS
RHINORRHEA: 0
CONSTIPATION: 0
DIARRHEA: 0
BACK PAIN: 0
NAUSEA: 1
VOMITING: 1
SHORTNESS OF BREATH: 0
ABDOMINAL PAIN: 1
COUGH: 0

## 2025-05-29 ENCOUNTER — HOSPITAL ENCOUNTER (OUTPATIENT)
Dept: GENERAL RADIOLOGY | Age: 30
Discharge: HOME | End: 2025-05-29
Payer: MEDICAID

## 2025-05-29 ENCOUNTER — OFFICE VISIT (OUTPATIENT)
Age: 30
End: 2025-05-29
Payer: MEDICAID

## 2025-05-29 VITALS
WEIGHT: 232 LBS | OXYGEN SATURATION: 97 % | HEIGHT: 70 IN | BODY MASS INDEX: 33.21 KG/M2 | TEMPERATURE: 97.3 F | SYSTOLIC BLOOD PRESSURE: 180 MMHG | HEART RATE: 113 BPM | DIASTOLIC BLOOD PRESSURE: 105 MMHG

## 2025-05-29 DIAGNOSIS — Z13.220 SCREENING CHOLESTEROL LEVEL: ICD-10-CM

## 2025-05-29 DIAGNOSIS — R20.0 NUMBNESS AND TINGLING OF BOTH LEGS: ICD-10-CM
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
## 2025-05-29 DIAGNOSIS — R20.2 NUMBNESS AND TINGLING OF BOTH LEGS: ICD-10-CM

## 2025-05-29 DIAGNOSIS — G62.9 NEUROPATHY: ICD-10-CM
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
## 2025-05-29 DIAGNOSIS — K59.00 CONSTIPATION, UNSPECIFIED CONSTIPATION TYPE: ICD-10-CM

## 2025-05-29 DIAGNOSIS — G47.9 DISTURBANCE IN SLEEP BEHAVIOR: ICD-10-CM

## 2025-05-29 DIAGNOSIS — M54.42 MIDLINE LOW BACK PAIN WITH BILATERAL SCIATICA, UNSPECIFIED CHRONICITY: ICD-10-CM

## 2025-05-29 DIAGNOSIS — M54.41 MIDLINE LOW BACK PAIN WITH BILATERAL SCIATICA, UNSPECIFIED CHRONICITY: ICD-10-CM

## 2025-05-29 LAB
ALBUMIN SERPL-MCNC: 4 G/DL (ref 3.5–5.2)
ALP SERPL-CCNC: 95 U/L (ref 40–129)
ALT SERPL-CCNC: 19 U/L (ref 10–50)
ANION GAP SERPL CALCULATED.3IONS-SCNC: 14 MMOL/L (ref 8–16)
AST SERPL-CCNC: 25 U/L (ref 10–50)
BASOPHILS # BLD: 0 K/UL (ref 0–0.2)
BASOPHILS NFR BLD: 0.6 % (ref 0–1)
BILIRUB SERPL-MCNC: 0.9 MG/DL (ref 0.2–1.2)
BUN SERPL-MCNC: 7 MG/DL (ref 6–20)
CALCIUM SERPL-MCNC: 9.2 MG/DL (ref 8.6–10)
CHLORIDE SERPL-SCNC: 101 MMOL/L (ref 98–107)
CHOLEST SERPL-MCNC: 131 MG/DL (ref 0–199)
CO2 SERPL-SCNC: 24 MMOL/L (ref 22–29)
CREAT SERPL-MCNC: 0.6 MG/DL (ref 0.7–1.2)
CRP SERPL-MCNC: 5.1 MG/L (ref 0–5)
EOSINOPHIL # BLD: 0.3 K/UL (ref 0–0.6)
EOSINOPHIL NFR BLD: 4.7 % (ref 0–5)
ERYTHROCYTE [DISTWIDTH] IN BLOOD BY AUTOMATED COUNT: 19.5 % (ref 11.5–14.5)
ERYTHROCYTE [SEDIMENTATION RATE] IN BLOOD BY WESTERGREN METHOD: 1 MM/HR (ref 0–10)
FOLATE SERPL-MCNC: <2 NG/ML (ref 4.5–32.2)
GLUCOSE SERPL-MCNC: 92 MG/DL (ref 70–99)
HBA1C MFR BLD: 5.2 % (ref 4–5.6)
HCT VFR BLD AUTO: 60.4 % (ref 42–52)
HDLC SERPL-MCNC: 36 MG/DL (ref 40–60)
HGB BLD-MCNC: 20.6 G/DL (ref 14–18)
IMM GRANULOCYTES # BLD: 0 K/UL
LDLC SERPL CALC-MCNC: 74 MG/DL
LYMPHOCYTES # BLD: 1.1 K/UL (ref 1.1–4.5)
LYMPHOCYTES NFR BLD: 20.1 % (ref 20–40)
MCH RBC QN AUTO: 35.2 PG (ref 27–31)
MCHC RBC AUTO-ENTMCNC: 34.1 G/DL (ref 33–37)
MCV RBC AUTO: 103.2 FL (ref 80–94)
MONOCYTES # BLD: 0.4 K/UL (ref 0–0.9)
MONOCYTES NFR BLD: 7.8 % (ref 0–10)
NEUTROPHILS # BLD: 3.5 K/UL (ref 1.5–7.5)
NEUTS SEG NFR BLD: 66.4 % (ref 50–65)
PLATELET # BLD AUTO: 245 K/UL (ref 130–400)
PMV BLD AUTO: 9.1 FL (ref 9.4–12.4)
POTASSIUM SERPL-SCNC: 4.4 MMOL/L (ref 3.5–5.1)
PROT SERPL-MCNC: 6.8 G/DL (ref 6.4–8.3)
RBC # BLD AUTO: 5.85 M/UL (ref 4.7–6.1)
RHEUMATOID FACT SER NEPH-ACNC: <10 IU/ML
SODIUM SERPL-SCNC: 139 MMOL/L (ref 136–145)
TRIGL SERPL-MCNC: 106 MG/DL (ref 0–149)
TSH SERPL DL<=0.005 MIU/L-ACNC: 2.28 UIU/ML (ref 0.27–4.2)
VIT B12 SERPL-MCNC: 473 PG/ML (ref 232–1245)
WBC # BLD AUTO: 5.3 K/UL (ref 4.8–10.8)

## 2025-05-29 PROCEDURE — 72100 X-RAY EXAM L-S SPINE 2/3 VWS: CPT

## 2025-05-29 RX ORDER — TRIAMCINOLONE ACETONIDE 40 MG/ML
80 INJECTION, SUSPENSION INTRA-ARTICULAR; INTRAMUSCULAR ONCE
Status: COMPLETED | OUTPATIENT
Start: 2025-05-29 | End: 2025-05-29

## 2025-05-29 RX ORDER — AMITRIPTYLINE HYDROCHLORIDE 10 MG/1
10 TABLET ORAL NIGHTLY
Qty: 30 TABLET | Refills: 1 | Status: SHIPPED | OUTPATIENT
Start: 2025-05-29

## 2025-05-29 RX ORDER — POLYETHYLENE GLYCOL 3350 17 G/17G
17 POWDER, FOR SOLUTION ORAL 2 TIMES DAILY
Qty: 1020 G | Refills: 1 | Status: SHIPPED | OUTPATIENT
Start: 2025-05-29

## 2025-05-29 RX ADMIN — TRIAMCINOLONE ACETONIDE 80 MG: 40 INJECTION, SUSPENSION INTRA-ARTICULAR; INTRAMUSCULAR at 09:30

## 2025-05-29 ASSESSMENT — ENCOUNTER SYMPTOMS
ABDOMINAL PAIN: 0
DIARRHEA: 0
NAUSEA: 1
BACK PAIN: 1
VOMITING: 1
COUGH: 0
RHINORRHEA: 0
SHORTNESS OF BREATH: 0
CONSTIPATION: 1

## 2025-05-29 NOTE — PROGRESS NOTES
After obtaining consent, and per orders of Dr. Conrad, injection of Kenalog given in Right upper quad. gluteus by Joann Love RN. Patient instructed to remain in clinic for 20 minutes afterwards, and to report any adverse reaction to me immediately.

## 2025-06-01 LAB
C3 SERPL-MCNC: 140 MG/DL (ref 90–180)
C4 SERPL-MCNC: 21 MG/DL (ref 10–40)
NUCLEAR IGG SER QL IA: NORMAL
RHEUMATOID FACT SER NEPH-ACNC: <10 IU/ML (ref 0–14)

## 2025-06-03 ENCOUNTER — TELEPHONE (OUTPATIENT)
Age: 30
End: 2025-06-03

## 2025-06-03 DIAGNOSIS — R20.2 NUMBNESS AND TINGLING OF BOTH LEGS: ICD-10-CM

## 2025-06-03 DIAGNOSIS — R20.0 NUMBNESS AND TINGLING OF BOTH LEGS: ICD-10-CM

## 2025-06-03 DIAGNOSIS — R93.89 ABNORMAL X-RAY: Primary | ICD-10-CM

## 2025-06-04 DIAGNOSIS — M54.41 MIDLINE LOW BACK PAIN WITH BILATERAL SCIATICA, UNSPECIFIED CHRONICITY: ICD-10-CM

## 2025-06-04 DIAGNOSIS — M54.42 MIDLINE LOW BACK PAIN WITH BILATERAL SCIATICA, UNSPECIFIED CHRONICITY: ICD-10-CM

## 2025-06-04 DIAGNOSIS — R93.89 ABNORMAL X-RAY: Primary | ICD-10-CM

## 2025-06-04 DIAGNOSIS — R20.2 NUMBNESS AND TINGLING OF BOTH LEGS: ICD-10-CM

## 2025-06-04 DIAGNOSIS — R20.0 NUMBNESS AND TINGLING OF BOTH LEGS: ICD-10-CM
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
## 2025-06-20 DIAGNOSIS — G62.9 NEUROPATHY: ICD-10-CM

## 2025-06-20 DIAGNOSIS — G47.9 DISTURBANCE IN SLEEP BEHAVIOR: ICD-10-CM

## 2025-06-20 RX ORDER — AMITRIPTYLINE HYDROCHLORIDE 10 MG/1
10 TABLET ORAL NIGHTLY
Qty: 90 TABLET | Refills: 1 | Status: SHIPPED | OUTPATIENT
Start: 2025-06-20

## 2025-06-24 ENCOUNTER — OFFICE VISIT (OUTPATIENT)
Age: 30
End: 2025-06-24
Payer: MEDICAID

## 2025-06-24 VITALS
DIASTOLIC BLOOD PRESSURE: 94 MMHG | BODY MASS INDEX: 32.5 KG/M2 | TEMPERATURE: 97.8 F | SYSTOLIC BLOOD PRESSURE: 148 MMHG | HEART RATE: 100 BPM | OXYGEN SATURATION: 99 % | HEIGHT: 70 IN | WEIGHT: 227 LBS

## 2025-06-24 DIAGNOSIS — M54.50 CHRONIC LOW BACK PAIN, UNSPECIFIED BACK PAIN LATERALITY, UNSPECIFIED WHETHER SCIATICA PRESENT: Primary | ICD-10-CM

## 2025-06-24 DIAGNOSIS — G89.29 CHRONIC LOW BACK PAIN, UNSPECIFIED BACK PAIN LATERALITY, UNSPECIFIED WHETHER SCIATICA PRESENT: Primary | ICD-10-CM

## 2025-06-24 DIAGNOSIS — R11.2 NAUSEA AND VOMITING, UNSPECIFIED VOMITING TYPE: ICD-10-CM

## 2025-06-24 DIAGNOSIS — S99.922A INJURY OF TOE ON LEFT FOOT, INITIAL ENCOUNTER: ICD-10-CM

## 2025-06-24 PROCEDURE — A9270 NON-COVERED ITEM OR SERVICE: HCPCS | Performed by: FAMILY MEDICINE

## 2025-06-24 PROCEDURE — 4004F PT TOBACCO SCREEN RCVD TLK: CPT | Performed by: FAMILY MEDICINE

## 2025-06-24 PROCEDURE — G8427 DOCREV CUR MEDS BY ELIG CLIN: HCPCS | Performed by: FAMILY MEDICINE

## 2025-06-24 PROCEDURE — G8417 CALC BMI ABV UP PARAM F/U: HCPCS | Performed by: FAMILY MEDICINE

## 2025-06-24 PROCEDURE — 99213 OFFICE O/P EST LOW 20 MIN: CPT | Performed by: FAMILY MEDICINE

## 2025-06-24 RX ORDER — ONDANSETRON 4 MG/1
4 TABLET, ORALLY DISINTEGRATING ORAL EVERY 8 HOURS PRN
Qty: 30 TABLET | Refills: 3 | Status: SHIPPED | OUTPATIENT
Start: 2025-06-24

## 2025-06-24 RX ORDER — IBUPROFEN 800 MG/1
800 TABLET, FILM COATED ORAL EVERY 8 HOURS PRN
Qty: 90 TABLET | Refills: 2 | Status: SHIPPED | OUTPATIENT
Start: 2025-06-24

## 2025-06-24 NOTE — PROGRESS NOTES
extremities) or signs of injury.      Cervical back: Normal range of motion and neck supple. No muscular tenderness.      Right lower leg: No edema.      Left lower leg: No edema.   Lymphadenopathy:      Cervical: No cervical adenopathy.   Skin:     General: Skin is warm and dry.      Findings: No erythema or rash.   Neurological:      Mental Status: He is alert and oriented to person, place, and time.      Cranial Nerves: No cranial nerve deficit.   Psychiatric:         Mood and Affect: Mood normal.         Behavior: Behavior normal.         Thought Content: Thought content normal.               The patient (or guardian, if applicable) and other individuals in attendance with the patient were advised that Artificial Intelligence will be utilized during this visit to record, process the conversation to generate a clinical note and to support improvement of the AI technology. The patient (or guardian, if applicable) and other individuals in attendance at the appointment consented to the use of AI, including the recording.      An electronic signature was used to authenticate this note.    --PONCHO KHOURY MD

## 2025-07-14 ENCOUNTER — APPOINTMENT (OUTPATIENT)
Dept: GENERAL RADIOLOGY | Age: 30
End: 2025-07-14
Payer: MEDICAID

## 2025-07-14 ENCOUNTER — HOSPITAL ENCOUNTER (EMERGENCY)
Age: 30
Discharge: HOME | End: 2025-07-14
Attending: EMERGENCY MEDICINE
Payer: MEDICAID

## 2025-07-14 VITALS
HEART RATE: 108 BPM | WEIGHT: 224 LBS | BODY MASS INDEX: 32.07 KG/M2 | RESPIRATION RATE: 18 BRPM | HEIGHT: 70 IN | OXYGEN SATURATION: 96 % | DIASTOLIC BLOOD PRESSURE: 97 MMHG | SYSTOLIC BLOOD PRESSURE: 134 MMHG | TEMPERATURE: 98.3 F

## 2025-07-14 DIAGNOSIS — S93.401A SPRAIN OF RIGHT ANKLE, UNSPECIFIED LIGAMENT, INITIAL ENCOUNTER: Primary | ICD-10-CM

## 2025-07-14 PROCEDURE — 73630 X-RAY EXAM OF FOOT: CPT

## 2025-07-14 PROCEDURE — 99283 EMERGENCY DEPT VISIT LOW MDM: CPT

## 2025-07-14 PROCEDURE — 73610 X-RAY EXAM OF ANKLE: CPT

## 2025-07-14 ASSESSMENT — PAIN SCALES - GENERAL
PAINLEVEL_OUTOF10: 8
PAINLEVEL_OUTOF10: 8

## 2025-07-14 ASSESSMENT — PAIN - FUNCTIONAL ASSESSMENT: PAIN_FUNCTIONAL_ASSESSMENT: 0-10

## 2025-07-14 NOTE — ED PROVIDER NOTES
PATIENT REFERRED TO:  No follow-up provider specified.    DISCHARGE MEDICATIONS:  New Prescriptions    No medications on file          (Please note that portions of this note were completed with a voice recognition program.  Efforts were made to edit the dictations but occasionally words are mis-transcribed.)    Wesley Cho MD (electronically signed)Emergency Physician

## (undated) DEVICE — FORCEPS BX L240CM JAW DIA2.4MM ORNG L CAP W/ NDL DISP RAD

## (undated) DEVICE — ENDO KIT,LOURDES HOSPITAL: Brand: MEDLINE INDUSTRIES, INC.